# Patient Record
Sex: FEMALE | Race: BLACK OR AFRICAN AMERICAN | NOT HISPANIC OR LATINO | Employment: OTHER | URBAN - METROPOLITAN AREA
[De-identification: names, ages, dates, MRNs, and addresses within clinical notes are randomized per-mention and may not be internally consistent; named-entity substitution may affect disease eponyms.]

---

## 2017-01-23 ENCOUNTER — ALLSCRIPTS OFFICE VISIT (OUTPATIENT)
Dept: OTHER | Facility: OTHER | Age: 72
End: 2017-01-23

## 2017-01-23 DIAGNOSIS — Z13.820 ENCOUNTER FOR SCREENING FOR OSTEOPOROSIS: ICD-10-CM

## 2017-01-23 DIAGNOSIS — E11.9 TYPE 2 DIABETES MELLITUS WITHOUT COMPLICATIONS (HCC): ICD-10-CM

## 2017-03-29 ENCOUNTER — APPOINTMENT (OUTPATIENT)
Dept: LAB | Facility: CLINIC | Age: 72
End: 2017-03-29
Payer: MEDICARE

## 2017-03-29 ENCOUNTER — TRANSCRIBE ORDERS (OUTPATIENT)
Dept: MRI IMAGING | Facility: CLINIC | Age: 72
End: 2017-03-29

## 2017-03-29 DIAGNOSIS — E11.9 TYPE 2 DIABETES MELLITUS WITHOUT COMPLICATIONS (HCC): ICD-10-CM

## 2017-03-29 LAB
ALBUMIN SERPL BCP-MCNC: 3.2 G/DL (ref 3.5–5)
ALP SERPL-CCNC: 102 U/L (ref 46–116)
ALT SERPL W P-5'-P-CCNC: 21 U/L (ref 12–78)
ANION GAP SERPL CALCULATED.3IONS-SCNC: 9 MMOL/L (ref 4–13)
AST SERPL W P-5'-P-CCNC: 14 U/L (ref 5–45)
BASOPHILS # BLD AUTO: 0.02 THOUSANDS/ΜL (ref 0–0.1)
BASOPHILS NFR BLD AUTO: 1 % (ref 0–1)
BILIRUB SERPL-MCNC: 0.54 MG/DL (ref 0.2–1)
BUN SERPL-MCNC: 24 MG/DL (ref 5–25)
CALCIUM SERPL-MCNC: 8.9 MG/DL (ref 8.3–10.1)
CHLORIDE SERPL-SCNC: 104 MMOL/L (ref 100–108)
CHOLEST SERPL-MCNC: 226 MG/DL (ref 50–200)
CO2 SERPL-SCNC: 28 MMOL/L (ref 21–32)
CREAT SERPL-MCNC: 1.19 MG/DL (ref 0.6–1.3)
CREAT UR-MCNC: 134 MG/DL
EOSINOPHIL # BLD AUTO: 0.1 THOUSAND/ΜL (ref 0–0.61)
EOSINOPHIL NFR BLD AUTO: 3 % (ref 0–6)
ERYTHROCYTE [DISTWIDTH] IN BLOOD BY AUTOMATED COUNT: 13.8 % (ref 11.6–15.1)
EST. AVERAGE GLUCOSE BLD GHB EST-MCNC: 143 MG/DL
GFR SERPL CREATININE-BSD FRML MDRD: 54.2 ML/MIN/1.73SQ M
GLUCOSE P FAST SERPL-MCNC: 103 MG/DL (ref 65–99)
HBA1C MFR BLD: 6.6 % (ref 4.2–6.3)
HCT VFR BLD AUTO: 38.8 % (ref 34.8–46.1)
HDLC SERPL-MCNC: 58 MG/DL (ref 40–60)
HGB BLD-MCNC: 12.7 G/DL (ref 11.5–15.4)
LDLC SERPL CALC-MCNC: 150 MG/DL (ref 0–100)
LYMPHOCYTES # BLD AUTO: 1.61 THOUSANDS/ΜL (ref 0.6–4.47)
LYMPHOCYTES NFR BLD AUTO: 40 % (ref 14–44)
MCH RBC QN AUTO: 28.5 PG (ref 26.8–34.3)
MCHC RBC AUTO-ENTMCNC: 32.7 G/DL (ref 31.4–37.4)
MCV RBC AUTO: 87 FL (ref 82–98)
MICROALBUMIN UR-MCNC: 9.8 MG/L (ref 0–20)
MICROALBUMIN/CREAT 24H UR: 7 MG/G CREATININE (ref 0–30)
MONOCYTES # BLD AUTO: 0.41 THOUSAND/ΜL (ref 0.17–1.22)
MONOCYTES NFR BLD AUTO: 10 % (ref 4–12)
NEUTROPHILS # BLD AUTO: 1.91 THOUSANDS/ΜL (ref 1.85–7.62)
NEUTS SEG NFR BLD AUTO: 46 % (ref 43–75)
NRBC BLD AUTO-RTO: 0 /100 WBCS
PLATELET # BLD AUTO: 264 THOUSANDS/UL (ref 149–390)
PMV BLD AUTO: 10.6 FL (ref 8.9–12.7)
POTASSIUM SERPL-SCNC: 3.7 MMOL/L (ref 3.5–5.3)
PROT SERPL-MCNC: 7.7 G/DL (ref 6.4–8.2)
RBC # BLD AUTO: 4.45 MILLION/UL (ref 3.81–5.12)
SODIUM SERPL-SCNC: 141 MMOL/L (ref 136–145)
TRIGL SERPL-MCNC: 90 MG/DL
WBC # BLD AUTO: 4.06 THOUSAND/UL (ref 4.31–10.16)

## 2017-03-29 PROCEDURE — 36415 COLL VENOUS BLD VENIPUNCTURE: CPT

## 2017-03-29 PROCEDURE — 80053 COMPREHEN METABOLIC PANEL: CPT

## 2017-03-29 PROCEDURE — 85025 COMPLETE CBC W/AUTO DIFF WBC: CPT

## 2017-03-29 PROCEDURE — 82570 ASSAY OF URINE CREATININE: CPT

## 2017-03-29 PROCEDURE — 83036 HEMOGLOBIN GLYCOSYLATED A1C: CPT

## 2017-03-29 PROCEDURE — 82043 UR ALBUMIN QUANTITATIVE: CPT

## 2017-03-29 PROCEDURE — 80061 LIPID PANEL: CPT

## 2017-03-30 ENCOUNTER — GENERIC CONVERSION - ENCOUNTER (OUTPATIENT)
Dept: OTHER | Facility: OTHER | Age: 72
End: 2017-03-30

## 2017-04-10 ENCOUNTER — TRANSCRIBE ORDERS (OUTPATIENT)
Dept: LAB | Facility: CLINIC | Age: 72
End: 2017-04-10

## 2017-04-10 ENCOUNTER — APPOINTMENT (OUTPATIENT)
Dept: LAB | Facility: CLINIC | Age: 72
End: 2017-04-10
Payer: MEDICARE

## 2017-04-10 ENCOUNTER — ALLSCRIPTS OFFICE VISIT (OUTPATIENT)
Dept: OTHER | Facility: OTHER | Age: 72
End: 2017-04-10

## 2017-04-10 DIAGNOSIS — E11.9 TYPE 2 DIABETES MELLITUS WITHOUT COMPLICATIONS (HCC): ICD-10-CM

## 2017-04-10 DIAGNOSIS — R39.9 UNSPECIFIED SYMPTOMS AND SIGNS INVOLVING THE GENITOURINARY SYSTEM: ICD-10-CM

## 2017-04-10 LAB
BACTERIA UR QL AUTO: ABNORMAL /HPF
BILIRUB UR QL STRIP: NEGATIVE
CLARITY UR: CLEAR
COLOR UR: YELLOW
GLUCOSE UR STRIP-MCNC: NEGATIVE MG/DL
HGB UR QL STRIP.AUTO: NEGATIVE
KETONES UR STRIP-MCNC: NEGATIVE MG/DL
LEUKOCYTE ESTERASE UR QL STRIP: ABNORMAL
NITRITE UR QL STRIP: NEGATIVE
NON-SQ EPI CELLS URNS QL MICRO: ABNORMAL /HPF
PH UR STRIP.AUTO: 6 [PH] (ref 4.5–8)
PROT UR STRIP-MCNC: NEGATIVE MG/DL
RBC #/AREA URNS AUTO: ABNORMAL /HPF
SP GR UR STRIP.AUTO: 1.02 (ref 1–1.03)
UROBILINOGEN UR QL STRIP.AUTO: 1 E.U./DL
WBC #/AREA URNS AUTO: ABNORMAL /HPF

## 2017-04-10 PROCEDURE — 81001 URINALYSIS AUTO W/SCOPE: CPT

## 2017-04-10 PROCEDURE — 87086 URINE CULTURE/COLONY COUNT: CPT

## 2017-04-11 ENCOUNTER — ALLSCRIPTS OFFICE VISIT (OUTPATIENT)
Dept: OTHER | Facility: OTHER | Age: 72
End: 2017-04-11

## 2017-04-11 ENCOUNTER — GENERIC CONVERSION - ENCOUNTER (OUTPATIENT)
Dept: OTHER | Facility: OTHER | Age: 72
End: 2017-04-11

## 2017-04-11 LAB — BACTERIA UR CULT: NORMAL

## 2017-04-11 RX ORDER — AZELASTINE HYDROCHLORIDE 0.5 MG/ML
1 SOLUTION/ DROPS OPHTHALMIC 2 TIMES DAILY
COMMUNITY
End: 2018-04-09

## 2017-04-11 RX ORDER — DICLOFENAC SODIUM 75 MG/1
75 TABLET, DELAYED RELEASE ORAL 2 TIMES DAILY
COMMUNITY
End: 2018-04-09

## 2017-04-11 RX ORDER — PANTOPRAZOLE SODIUM 40 MG/1
40 TABLET, DELAYED RELEASE ORAL DAILY
COMMUNITY
End: 2019-03-11 | Stop reason: SDDI

## 2017-04-11 RX ORDER — LOVASTATIN 10 MG/1
10 TABLET ORAL
COMMUNITY
End: 2018-01-29 | Stop reason: SDUPTHER

## 2017-04-11 RX ORDER — QUINAPRIL HCL AND HYDROCHLOROTHIAZIDE 10; 12.5 MG/1; MG/1
1 TABLET ORAL DAILY
COMMUNITY
End: 2018-06-07 | Stop reason: SDUPTHER

## 2017-04-11 RX ORDER — LEVOCETIRIZINE DIHYDROCHLORIDE 5 MG/1
5 TABLET, FILM COATED ORAL AS NEEDED
COMMUNITY

## 2017-04-18 ENCOUNTER — ANESTHESIA EVENT (OUTPATIENT)
Dept: PERIOP | Facility: HOSPITAL | Age: 72
End: 2017-04-18
Payer: MEDICARE

## 2017-04-19 ENCOUNTER — ANESTHESIA (OUTPATIENT)
Dept: PERIOP | Facility: HOSPITAL | Age: 72
End: 2017-04-19
Payer: MEDICARE

## 2017-04-19 ENCOUNTER — GENERIC CONVERSION - ENCOUNTER (OUTPATIENT)
Dept: OTHER | Facility: OTHER | Age: 72
End: 2017-04-19

## 2017-04-19 ENCOUNTER — HOSPITAL ENCOUNTER (OUTPATIENT)
Facility: HOSPITAL | Age: 72
Setting detail: OUTPATIENT SURGERY
Discharge: HOME | End: 2017-04-19
Attending: INTERNAL MEDICINE | Admitting: INTERNAL MEDICINE
Payer: MEDICARE

## 2017-04-19 VITALS
RESPIRATION RATE: 17 BRPM | HEIGHT: 66 IN | BODY MASS INDEX: 29.79 KG/M2 | TEMPERATURE: 97.3 F | HEART RATE: 68 BPM | DIASTOLIC BLOOD PRESSURE: 89 MMHG | OXYGEN SATURATION: 100 % | SYSTOLIC BLOOD PRESSURE: 144 MMHG | WEIGHT: 185.38 LBS

## 2017-04-19 DIAGNOSIS — K58.9 IRRITABLE BOWEL SYNDROME WITHOUT DIARRHEA: ICD-10-CM

## 2017-04-19 DIAGNOSIS — Z86.010 HISTORY OF COLONIC POLYPS: ICD-10-CM

## 2017-04-19 DIAGNOSIS — Z12.11 ENCOUNTER FOR SCREENING FOR MALIGNANT NEOPLASM OF COLON: ICD-10-CM

## 2017-04-19 DIAGNOSIS — R10.84 GENERALIZED ABDOMINAL PAIN: ICD-10-CM

## 2017-04-19 LAB — GLUCOSE SERPL-MCNC: 119 MG/DL (ref 65–140)

## 2017-04-19 PROCEDURE — 88341 IMHCHEM/IMCYTCHM EA ADD ANTB: CPT | Performed by: INTERNAL MEDICINE

## 2017-04-19 PROCEDURE — 82948 REAGENT STRIP/BLOOD GLUCOSE: CPT

## 2017-04-19 PROCEDURE — 88305 TISSUE EXAM BY PATHOLOGIST: CPT | Performed by: INTERNAL MEDICINE

## 2017-04-19 PROCEDURE — 88342 IMHCHEM/IMCYTCHM 1ST ANTB: CPT | Performed by: INTERNAL MEDICINE

## 2017-04-19 RX ORDER — PROPOFOL 10 MG/ML
INJECTION, EMULSION INTRAVENOUS AS NEEDED
Status: DISCONTINUED | OUTPATIENT
Start: 2017-04-19 | End: 2017-04-19 | Stop reason: SURG

## 2017-04-19 RX ORDER — SODIUM CHLORIDE, SODIUM LACTATE, POTASSIUM CHLORIDE, CALCIUM CHLORIDE 600; 310; 30; 20 MG/100ML; MG/100ML; MG/100ML; MG/100ML
INJECTION, SOLUTION INTRAVENOUS CONTINUOUS PRN
Status: DISCONTINUED | OUTPATIENT
Start: 2017-04-19 | End: 2017-04-19 | Stop reason: SURG

## 2017-04-19 RX ORDER — SODIUM CHLORIDE, SODIUM LACTATE, POTASSIUM CHLORIDE, CALCIUM CHLORIDE 600; 310; 30; 20 MG/100ML; MG/100ML; MG/100ML; MG/100ML
50 INJECTION, SOLUTION INTRAVENOUS CONTINUOUS
Status: DISCONTINUED | OUTPATIENT
Start: 2017-04-19 | End: 2017-04-19 | Stop reason: HOSPADM

## 2017-04-19 RX ADMIN — PROPOFOL 50 MG: 10 INJECTION, EMULSION INTRAVENOUS at 11:34

## 2017-04-19 RX ADMIN — PROPOFOL 50 MG: 10 INJECTION, EMULSION INTRAVENOUS at 11:28

## 2017-04-19 RX ADMIN — PROPOFOL 50 MG: 10 INJECTION, EMULSION INTRAVENOUS at 11:40

## 2017-04-19 RX ADMIN — SODIUM CHLORIDE, SODIUM LACTATE, POTASSIUM CHLORIDE, AND CALCIUM CHLORIDE: .6; .31; .03; .02 INJECTION, SOLUTION INTRAVENOUS at 11:01

## 2017-04-19 RX ADMIN — PROPOFOL 30 MG: 10 INJECTION, EMULSION INTRAVENOUS at 11:31

## 2017-04-19 RX ADMIN — PROPOFOL 120 MG: 10 INJECTION, EMULSION INTRAVENOUS at 11:26

## 2017-04-19 RX ADMIN — PROPOFOL 50 MG: 10 INJECTION, EMULSION INTRAVENOUS at 11:37

## 2017-04-24 ENCOUNTER — GENERIC CONVERSION - ENCOUNTER (OUTPATIENT)
Dept: OTHER | Facility: OTHER | Age: 72
End: 2017-04-24

## 2017-05-05 ENCOUNTER — HOSPITAL ENCOUNTER (OUTPATIENT)
Dept: MAMMOGRAPHY | Facility: CLINIC | Age: 72
Discharge: HOME | End: 2017-05-05
Payer: MEDICARE

## 2017-05-05 ENCOUNTER — GENERIC CONVERSION - ENCOUNTER (OUTPATIENT)
Dept: OTHER | Facility: OTHER | Age: 72
End: 2017-05-05

## 2017-05-05 DIAGNOSIS — Z13.820 ENCOUNTER FOR SCREENING FOR OSTEOPOROSIS: ICD-10-CM

## 2017-05-05 PROCEDURE — 77080 DXA BONE DENSITY AXIAL: CPT

## 2017-11-01 DIAGNOSIS — E78.5 HYPERLIPIDEMIA: ICD-10-CM

## 2017-11-01 DIAGNOSIS — I10 ESSENTIAL (PRIMARY) HYPERTENSION: ICD-10-CM

## 2017-11-01 DIAGNOSIS — E11.9 TYPE 2 DIABETES MELLITUS WITHOUT COMPLICATIONS (HCC): ICD-10-CM

## 2017-11-22 ENCOUNTER — APPOINTMENT (OUTPATIENT)
Dept: LAB | Facility: CLINIC | Age: 72
End: 2017-11-22
Payer: MEDICARE

## 2017-11-22 ENCOUNTER — TRANSCRIBE ORDERS (OUTPATIENT)
Dept: RADIOLOGY | Facility: CLINIC | Age: 72
End: 2017-11-22

## 2017-11-22 ENCOUNTER — GENERIC CONVERSION - ENCOUNTER (OUTPATIENT)
Dept: OTHER | Facility: OTHER | Age: 72
End: 2017-11-22

## 2017-11-22 DIAGNOSIS — I10 ESSENTIAL (PRIMARY) HYPERTENSION: ICD-10-CM

## 2017-11-22 DIAGNOSIS — E78.5 HYPERLIPIDEMIA: ICD-10-CM

## 2017-11-22 DIAGNOSIS — E11.9 TYPE 2 DIABETES MELLITUS WITHOUT COMPLICATIONS (HCC): ICD-10-CM

## 2017-11-22 LAB
ALBUMIN SERPL BCP-MCNC: 3.1 G/DL (ref 3.5–5)
ALP SERPL-CCNC: 91 U/L (ref 46–116)
ALT SERPL W P-5'-P-CCNC: 17 U/L (ref 12–78)
ANION GAP SERPL CALCULATED.3IONS-SCNC: 5 MMOL/L (ref 4–13)
AST SERPL W P-5'-P-CCNC: 13 U/L (ref 5–45)
BASOPHILS # BLD AUTO: 0.02 THOUSANDS/ΜL (ref 0–0.1)
BASOPHILS NFR BLD AUTO: 1 % (ref 0–1)
BILIRUB SERPL-MCNC: 0.52 MG/DL (ref 0.2–1)
BUN SERPL-MCNC: 16 MG/DL (ref 5–25)
CALCIUM SERPL-MCNC: 9.3 MG/DL (ref 8.3–10.1)
CHLORIDE SERPL-SCNC: 101 MMOL/L (ref 100–108)
CHOLEST SERPL-MCNC: 163 MG/DL (ref 50–200)
CO2 SERPL-SCNC: 30 MMOL/L (ref 21–32)
CREAT SERPL-MCNC: 1.05 MG/DL (ref 0.6–1.3)
EOSINOPHIL # BLD AUTO: 0.07 THOUSAND/ΜL (ref 0–0.61)
EOSINOPHIL NFR BLD AUTO: 2 % (ref 0–6)
ERYTHROCYTE [DISTWIDTH] IN BLOOD BY AUTOMATED COUNT: 13.6 % (ref 11.6–15.1)
EST. AVERAGE GLUCOSE BLD GHB EST-MCNC: 137 MG/DL
GFR SERPL CREATININE-BSD FRML MDRD: 62 ML/MIN/1.73SQ M
GLUCOSE P FAST SERPL-MCNC: 103 MG/DL (ref 65–99)
HBA1C MFR BLD: 6.4 % (ref 4.2–6.3)
HCT VFR BLD AUTO: 38 % (ref 34.8–46.1)
HDLC SERPL-MCNC: 54 MG/DL (ref 40–60)
HGB BLD-MCNC: 12.5 G/DL (ref 11.5–15.4)
LDLC SERPL CALC-MCNC: 90 MG/DL (ref 0–100)
LYMPHOCYTES # BLD AUTO: 1.73 THOUSANDS/ΜL (ref 0.6–4.47)
LYMPHOCYTES NFR BLD AUTO: 40 % (ref 14–44)
MCH RBC QN AUTO: 28.8 PG (ref 26.8–34.3)
MCHC RBC AUTO-ENTMCNC: 32.9 G/DL (ref 31.4–37.4)
MCV RBC AUTO: 88 FL (ref 82–98)
MONOCYTES # BLD AUTO: 0.46 THOUSAND/ΜL (ref 0.17–1.22)
MONOCYTES NFR BLD AUTO: 11 % (ref 4–12)
NEUTROPHILS # BLD AUTO: 2.06 THOUSANDS/ΜL (ref 1.85–7.62)
NEUTS SEG NFR BLD AUTO: 46 % (ref 43–75)
NRBC BLD AUTO-RTO: 0 /100 WBCS
PLATELET # BLD AUTO: 246 THOUSANDS/UL (ref 149–390)
PMV BLD AUTO: 9.8 FL (ref 8.9–12.7)
POTASSIUM SERPL-SCNC: 4.3 MMOL/L (ref 3.5–5.3)
PROT SERPL-MCNC: 7.5 G/DL (ref 6.4–8.2)
RBC # BLD AUTO: 4.34 MILLION/UL (ref 3.81–5.12)
SODIUM SERPL-SCNC: 136 MMOL/L (ref 136–145)
TRIGL SERPL-MCNC: 96 MG/DL
WBC # BLD AUTO: 4.36 THOUSAND/UL (ref 4.31–10.16)

## 2017-11-22 PROCEDURE — 80061 LIPID PANEL: CPT

## 2017-11-22 PROCEDURE — 83036 HEMOGLOBIN GLYCOSYLATED A1C: CPT

## 2017-11-22 PROCEDURE — 80053 COMPREHEN METABOLIC PANEL: CPT

## 2017-11-22 PROCEDURE — 85025 COMPLETE CBC W/AUTO DIFF WBC: CPT

## 2017-11-22 PROCEDURE — 36415 COLL VENOUS BLD VENIPUNCTURE: CPT

## 2017-12-05 ENCOUNTER — ALLSCRIPTS OFFICE VISIT (OUTPATIENT)
Dept: OTHER | Facility: OTHER | Age: 72
End: 2017-12-05

## 2017-12-05 DIAGNOSIS — E11.9 TYPE 2 DIABETES MELLITUS WITHOUT COMPLICATIONS (HCC): ICD-10-CM

## 2017-12-05 DIAGNOSIS — M25.532 PAIN IN LEFT WRIST: ICD-10-CM

## 2017-12-06 NOTE — PROGRESS NOTES
Assessment    1  Type 2 diabetes mellitus (250 00) (E11 9)   2  Hypertension, benign (401 1) (I10)   3  Hyperlipidemia (272 4) (E78 5)   4  Left wrist pain (719 43) (M25 532)    Plan  Left wrist pain    · * XR WRIST 3+ VIEW LEFT; Status:Active; Requested for:56Bho4820;   Need for prophylactic vaccination against Streptococcus pneumoniae (pneumococcus)    · Prevnar 13 Intramuscular Suspension  Need for prophylactic vaccination and inoculation against influenza    · Fluzone High-Dose 0 5 ML Intramuscular Suspension Prefilled Syringe  Screening for genitourinary condition    · *VB - Urinary Incontinence Screen (Dx Z13 89 Screen for UI); Status:Complete - Retrospective ByProtocol Authorization;   Done: 42KNX5825 01:18PM  Type 2 diabetes mellitus    · (1) CBC/PLT/DIFF; Status:Active; Requested for:87Vqf6996;    · (1) COMPREHENSIVE METABOLIC PANEL; Status:Active; Requested for:25Nfo8654;    · (1) HEMOGLOBIN A1C; Status:Active; Requested for:32Jen4532;    · (1) LIPID PANEL, FASTING; Status:Active; Requested for:03Por4823;    · Follow-up visit in 4 Months Evaluation and Treatment  Follow-up  Status: Hold For - Scheduling Requested for: 24WGF8405    Discussion/Summary  Discussion Summary:   Chronic problems are stable  No changes in meds  Continue diet and exercise  her wrist will be arthritis  Counseling Documentation With Imm: The patient was counseled regarding instructions for management,-- impressions  Medication SE Review and Pt Understands Tx: Possible side effects of new medications were reviewed with the patient/guardian today  The treatment plan was reviewed with the patient/guardian  The patient/guardian understands and agrees with the treatment plan   Self Referrals:   Self Referrals: No      Chief Complaint  Chief Complaint Free Text Note Form: Patient is here today for a routine follow up and lab review  History of Present Illness  HPI: Here for followup with her    Her and her  did get the pill boxes as we discussed and they have been much more compliant with their meds  Her labs are all improved  Pressure is controlled  Sugar is down further  Cholesterol is controlled  Today she reports more problems with her left wrist that has been bothering her for awhile  She may have fallen but the two of them could not agree on this  Review of Systems  Complete-Female:  Cardiovascular: no chest pain  Respiratory: no shortness of breath  Gastrointestinal: no abdominal pain  Active Problems  1  Advance directive discussed with patient (V65 49) (Z71 89)   2  Allergic rhinitis (477 9) (J30 9)   3  Chronic bilateral low back pain with right-sided sciatica (724 2,724 3,338 29) (M54 41,G89 29)   4  Colon cancer screening (V76 51) (Z12 11)   5  Generalized abdominal pain (789 07) (R10 84)   6  History of colon polyps (V12 72) (Z86 010)   7  Hyperlipidemia (272 4) (E78 5)   8  Hypertension, benign (401 1) (I10)   9  Irritable bowel syndrome without diarrhea (564 1) (K58 9)   10  Multiple nevi (216 9) (D22 9)   11  Screening for osteoporosis (V82 81) (Z13 820)   12  Type 2 diabetes mellitus (250 00) (E11 9)   13  UTI symptoms (788 99) (R39 9)    Past Medical History  1  History of HNP (herniated nucleus pulposus), lumbar (722 10) (M51 26)   2  History of Osteoarthritis of lumbar spine (721 3) (M47 816)  Active Problems And Past Medical History Reviewed: The active problems and past medical history were reviewed and updated today  Surgical History  1  History of Parathyroid Surgery   2  History of Umbilical Hernia Repair    Family History  Mother    1  Family history of Breast cancer, female  Brother    2  Family history of Living and Healthy  Family History    3  Family history of hypertension (V17 49) (Z82 49)    Social History     ·    · Never smoked cigarettes (V49 89) (Z78 9)   · No alcohol use   · Retired   · Two children    Current Meds   1   Azelastine HCl - 0 05 % Ophthalmic Solution; INSTILL 1 DROP IN BOTH EYES EVERY 12 HOURS DAILY; Therapy: 27Apr2015 to (Evaluate:24Zyf0160)  Requested for: ; Last Rx:27Apr2015 Ordered   2  Diclofenac Sodium 75 MG Oral Tablet Delayed Release; Take 1 tablet twice daily; Therapy: 24TPI7886 to (Evaluate:23Apr2017)  Requested for: 93RXB4123; Last Rx:23Jan2017 Ordered   3  FreeStyle Lite Test In [employer]; test twice daily; Therapy: 63KHQ4091 to (Evaluate:35Omt0111)  Requested for: 67MZQ3816; Last Rx:03Nov2016 Ordered   4  Levocetirizine Dihydrochloride 5 MG Oral Tablet; TAKE 1 TABLET DAILY IN THE EVENING; Therapy: 27Apr2015 to 9615 9032)  Requested for: ; Last Rx:27Apr2015 Ordered   5  Levsin 0 125 MG Oral Tablet; TAKE 1 TABLET 3-4 TIMES DAILY AS NEEDED; Therapy: 19Qts7084 to (Evaluate:10Jun2017)  Requested for: 54Yat9341; Last Rx:51Fsu5924 Ordered   6  Lovastatin 10 MG Oral Tablet; TAKE 1 TABLET AT BEDTIME; Therapy: 38DNX0652 to (Evaluate:30Jan2018)  Requested for: 87ACC5017; Last Rx:01Nov2017 Ordered   7  MetFORMIN HCl - 1000 MG Oral Tablet; take one tablet by mouth twice daily; Therapy: 55FHM8701 to [phone])  Requested for: 63JRM3688; Last Rx:25Jan2017 Ordered   8  Pantoprazole Sodium 40 MG Oral Tablet Delayed Release; Take 1 tablet daily; Therapy: 54YZF7352 to ([phone])  Requested for: 31AYR5325; Last Rx:23Jan2017 Ordered   9  Quinapril-Hydrochlorothiazide 10-12 5 MG Oral Tablet; Take 1 tablet daily; Therapy: 11ONU8417 to (Evaluate:08Dec2017)  Requested for: 94KXJ2858; Last Rx:08Nov2017 Ordered  Medication List Reviewed: The medication list was reviewed and updated today  Allergies  1  Codeine Derivatives  2  FRUIT   3  Almonds   4   Other    Vitals  Vital Signs    Recorded: 35QTV9792 12:55PM   Temperature 97 5 F   Heart Rate 84   Systolic 368   Diastolic 66   Height 5 ft 6 in   Weight 188 lb    BMI Calculated 30 34   BSA Calculated 1 95   O2 Saturation 95       Physical Exam   Constitutional  General appearance: No acute distress, well appearing and well nourished  Pulmonary  Respiratory effort: No increased work of breathing or signs of respiratory distress  Auscultation of lungs: Clear to auscultation  Cardiovascular  Auscultation of heart: Normal rate and rhythm, normal S1 and S2, without murmurs  Examination of extremities for edema and/or varicosities: Normal    Psychiatric  Orientation to person, place, and time: Normal    Mood and affect: Normal          Results/Data  PHQ-9 Adult Depression Screening 72ENP7690 01:36PM User, [employer]     Test Name Result Flag Reference   PHQ-9 Adult Depression Score 1       Over the last two weeks, how often have you been bothered by any of the following problems? Little interest or pleasure in doing things: Not at all - 0 Feeling down, depressed, or hopeless: Not at all - 0 Trouble falling or staying asleep, or sleeping too much: Not at all - 0 Feeling tired or having little energy: Several days - 1 Poor appetite or over eating: Not at all - 0 Feeling bad about yourself - or that you are a failure or have let yourself or your family down: Not at all - 0 Trouble concentrating on things, such as reading the newspaper or watching television: Not at all - 0 Moving or speaking so slowly that other people could have noticed   Or the opposite -  being so fidgety or restless that you have been moving around a lot more than usual: Not at all - 0 Thoughts that you would be better off dead, or of hurting yourself in some way: Not at all - 0   PHQ-9 Adult Depression Screening Negative     PHQ-9 Difficulty Level Not difficult at all     PHQ-9 Severity Minimal Depression       *VB - Urinary Incontinence Screen (Dx Z13 89 Screen for UI) 34HXL2773 01:18PM Jose Gatica     Test Name Result Flag Reference   Urinary Incontinence Assessment 88ATO7492       Falls Risk Assessment (Dx Z13 89 Screen for Neurologic Disorder) 33WLG3717 01:16PM User, [employer]     Test Name Result Flag Reference Falls Risk      No falls in the past year     (1) CBC/PLT/DIFF 22Nov2017 08:34AM Jonnie Paulino Order Number: [id number]     Test Name Result Flag Reference   WBC COUNT 4 36 Thousand/uL  4 31-10 16   RBC COUNT 4 34 Million/uL  3 81-5 12   HEMOGLOBIN 12 5 g/dL  11 5-15 4   HEMATOCRIT 38 0 %  34 8-46  1   MCV 88 fL  82-98   MCH 28 8 pg  26 8-34 3   MCHC 32 9 g/dL  31 4-37 4   RDW 13 6 %  11 6-15 1   MPV 9 8 fL  8 9-12 7   PLATELET COUNT 424 Thousands/uL  149-390   nRBC AUTOMATED 0 /100 WBCs     NEUTROPHILS RELATIVE PERCENT 46 %  43-75   LYMPHOCYTES RELATIVE PERCENT 40 %  14-44   MONOCYTES RELATIVE PERCENT 11 %  4-12   EOSINOPHILS RELATIVE PERCENT 2 %  0-6   BASOPHILS RELATIVE PERCENT 1 %  0-1   NEUTROPHILS ABSOLUTE COUNT 2 06 Thousands/? ??L  1 85-7 62   LYMPHOCYTES ABSOLUTE COUNT 1 73 Thousands/? ??L  0 60-4 47   MONOCYTES ABSOLUTE COUNT 0 46 Thousand/? ??L  0 17-1 22   EOSINOPHILS ABSOLUTE COUNT 0 07 Thousand/? ??L  0 00-0 61   BASOPHILS ABSOLUTE COUNT 0 02 Thousands/? ??L  0 00-0 10     (1) COMPREHENSIVE METABOLIC PANEL 91UET5668 77:01FJ Jonnie Paulino Order Number: [id number]     Test Name Result Flag Reference   SODIUM 136 mmol/L  136-145   POTASSIUM 4 3 mmol/L  3 5-5 3   CHLORIDE 101 mmol/L  100-108   CARBON DIOXIDE 30 mmol/L  21-32   ANION GAP (CALC) 5 mmol/L  4-13   BLOOD UREA NITROGEN 16 mg/dL  5-25   CREATININE 1 05 mg/dL  0 60-1 30   Standardized to IDMS reference method   CALCIUM 9 3 mg/dL  8 3-10 1   BILI, TOTAL 0 52 mg/dL  0 20-1 00   ALK PHOSPHATAS 91 U/L     ALT (SGPT) 17 U/L  12-78   Specimen collection should occur prior to Sulfasalazine and/or Sulfapyridine administration due to the potential for falsely depressed results  AST(SGOT) 13 U/L  5-45   Specimen collection should occur prior to Sulfasalazine administration due to the potential for falsely depressed results     ALBUMIN 3 1 g/dL L 3 5-5 0   TOTAL PROTEIN 7 5 g/dL  6 4-8 2   eGFR 62 ml/min/1 73sq m       [facility] [employer] Kidney Disease Education Program recommendations are as follows: GFR calculation is accurate only with a steady state creatinine Chronic Kidney disease less than 60 ml/min/1 73 sq  meters Kidney failure less than 15 ml/min/1 73 sq  meters  GLUCOSE FASTING 103 mg/dL H 65-99   Specimen collection should occur prior to Sulfasalazine administration due to the potential for falsely depressed results  Specimen collection should occur prior to Sulfapyridine administration due to the potential for falsely elevated results  (1) HEMOGLOBIN A1C 22Nov2017 08:34AM Rancho Cox Order Number: [id number]     Test Name Result Flag Reference   HEMOGLOBIN A1C 6 4 % H 4 2-6 3   EST  AVG  GLUCOSE 137 mg/dl       (1) LIPID PANEL, FASTING 22Nov2017 08:34AM Rancho Cox Order Number: [id number]     Test Name Result Flag Reference   CHOLESTEROL 163 mg/dL     HDL,DIRECT 54 mg/dL  40-60   Specimen collection should occur prior to Metamizole administration due to the potential for falsley depressed results  LDL CHOLESTEROL CALCULATED 90 mg/dL  0-100     Triglyceride:       Normal <150 mg/dl  Borderline High 150-199 mg/dl  High 200-499 mg/dl  Very High >499 mg/dl   Cholesterol:      Desirable <200 mg/dl   Borderline High 200-239 mg/dl   High >239 mg/dl   HDL Cholesterol:      High>59 mg/dL   Low <41 mg/dL   This screening LDL is a calculated result  It does not have the accuracy of the Direct Measured LDL in the monitoring of patients with hyperlipidemia and/or statin therapy  Direct Measure LDL (BEF216) must be ordered separately in these patients  TRIGLYCERIDES 96 mg/dL  <=150   Specimen collection should occur prior to N-Acetylcysteine or Metamizole administration due to the potential for falsely depressed results  Health Management  Colon cancer screening   COLONOSCOPY; every 5 years; Last 84YKJ4349; Next Due: 11Lni8867;  Active  Type 2 diabetes mellitus   *VB - Eye Exam; every 2 years; Last 29Jul2016; Next Due: 48GSG0716; Active  Visit for screening mammogram   Digital Bilateral Screening Mammogram With CAD; every 2 years; Last 23Oct2015; Next MQS:96YEN4857;  Overdue    Signatures   Electronically signed by : Alvin Hinkle MD; Dec  5 2017  1:53PM EST                       (Author)

## 2017-12-15 ENCOUNTER — APPOINTMENT (OUTPATIENT)
Dept: RADIOLOGY | Facility: CLINIC | Age: 72
End: 2017-12-15
Payer: MEDICARE

## 2017-12-15 DIAGNOSIS — M25.532 PAIN IN LEFT WRIST: ICD-10-CM

## 2017-12-15 PROCEDURE — 73110 X-RAY EXAM OF WRIST: CPT

## 2017-12-21 ENCOUNTER — GENERIC CONVERSION - ENCOUNTER (OUTPATIENT)
Dept: OTHER | Facility: OTHER | Age: 72
End: 2017-12-21

## 2018-01-11 NOTE — RESULT NOTES
Verified Results  (1) LIPID PANEL, FASTING 22Nov2017 08:34AM Katherine Kaur Order Number: [id number]     Test Name Result Flag Reference   CHOLESTEROL 163 mg/dL     HDL,DIRECT 54 mg/dL  40-60   Specimen collection should occur prior to Metamizole administration due to the potential for falsley depressed results  LDL CHOLESTEROL CALCULATED 90 mg/dL  0-100   Triglyceride:        Normal <150 mg/dl   Borderline High 150-199 mg/dl   High 200-499 mg/dl   Very High >499 mg/dl      Cholesterol:       Desirable <200 mg/dl    Borderline High 200-239 mg/dl    High >239 mg/dl      HDL Cholesterol:       High>59 mg/dL    Low <41 mg/dL      This screening LDL is a calculated result  It does not have the accuracy of the Direct Measured LDL in the monitoring of patients with hyperlipidemia and/or statin therapy  Direct Measure LDL (KGR249) must be ordered separately in these patients  TRIGLYCERIDES 96 mg/dL  <=150   Specimen collection should occur prior to N-Acetylcysteine or Metamizole administration due to the potential for falsely depressed results  (1) CBC/PLT/DIFF 72CVV7867 08:34A Katherine Kaur Order Number: [id number]     Test Name Result Flag Reference   WBC COUNT 4 36 Thousand/uL  4 31-10 16   RBC COUNT 4 34 Million/uL  3 81-5 12   HEMOGLOBIN 12 5 g/dL  11 5-15 4   HEMATOCRIT 38 0 %  34 8-46  1   MCV 88 fL  82-98   MCH 28 8 pg  26 8-34 3   MCHC 32 9 g/dL  31 4-37 4   RDW 13 6 %  11 6-15 1   MPV 9 8 fL  8 9-12 7   PLATELET COUNT 801 Thousands/uL  149-390   nRBC AUTOMATED 0 /100 WBCs     NEUTROPHILS RELATIVE PERCENT 46 %  43-75   LYMPHOCYTES RELATIVE PERCENT 40 %  14-44   MONOCYTES RELATIVE PERCENT 11 %  4-12   EOSINOPHILS RELATIVE PERCENT 2 %  0-6   BASOPHILS RELATIVE PERCENT 1 %  0-1   NEUTROPHILS ABSOLUTE COUNT 2 06 Thousands/? ??L  1 85-7 62   LYMPHOCYTES ABSOLUTE COUNT 1 73 Thousands/? ??L  0 60-4 47   MONOCYTES ABSOLUTE COUNT 0 46 Thousand/? ??L  0 17-1 22   EOSINOPHILS ABSOLUTE COUNT 0 07 Thousand/? ??L  0 00-0 61   BASOPHILS ABSOLUTE COUNT 0 02 Thousands/? ??L  0 00-0 10     (1) COMPREHENSIVE METABOLIC PANEL 48KVQ4196 54:66UM Katey Smith Order Number: [id number]     Test Name Result Flag Reference   SODIUM 136 mmol/L  136-145   POTASSIUM 4 3 mmol/L  3 5-5 3   CHLORIDE 101 mmol/L  100-108   CARBON DIOXIDE 30 mmol/L  21-32   ANION GAP (CALC) 5 mmol/L  4-13   BLOOD UREA NITROGEN 16 mg/dL  5-25   CREATININE 1 05 mg/dL  0 60-1 30   Standardized to IDMS reference method   CALCIUM 9 3 mg/dL  8 3-10 1   BILI, TOTAL 0 52 mg/dL  0 20-1 00   ALK PHOSPHATAS 91 U/L     ALT (SGPT) 17 U/L  12-78   Specimen collection should occur prior to Sulfasalazine and/or Sulfapyridine administration due to the potential for falsely depressed results  AST(SGOT) 13 U/L  5-45   Specimen collection should occur prior to Sulfasalazine administration due to the potential for falsely depressed results  ALBUMIN 3 1 g/dL L 3 5-5 0   TOTAL PROTEIN 7 5 g/dL  6 4-8 2   eGFR 62 ml/min/1 73sq m     National Kidney Disease Education Program recommendations are as follows:  GFR calculation is accurate only with a steady state creatinine  Chronic Kidney disease less than 60 ml/min/1 73 sq  meters  Kidney failure less than 15 ml/min/1 73 sq  meters  GLUCOSE FASTING 103 mg/dL H 65-99   Specimen collection should occur prior to Sulfasalazine administration due to the potential for falsely depressed results  Specimen collection should occur prior to Sulfapyridine administration due to the potential for falsely elevated results  (1) HEMOGLOBIN A1C 22Nov2017 08:34AM Katey Smith Order Number: [id number]     Test Name Result Flag Reference   HEMOGLOBIN A1C 6 4 % H 4 2-6 3   EST  AVG   GLUCOSE 137 mg/dl

## 2018-01-12 NOTE — RESULT NOTES
Verified Results  * DXA BONE DENSITY SPINE HIP AND PELVIS 05BOQ6072 10:18AM Jojo Solders Order Number: [id number]    - Patient Instructions: To schedule this appointment, please contact Central Scheduling at [phone]  Test Name Result Flag Reference   DXA BONE DENSITY SPINE HIP AND PELVIS (Report)     CENTRAL DXA SCAN     CLINICAL HISTORY:  70year old postmenopausal -American female with no significant past medical history  Osteoporosis screening  TECHNIQUE: Bone densitometry was performed using a [employer] Horizon C bone densitometer  Regions of interest appear properly placed  There are no obvious fractures or other confounding variables which could limit the study  Degenerative changes of the    lumbar spine and hip  This will falsely elevate the bone mineral densities in these regions  COMPARISON: None  RESULTS:    LUMBAR SPINE: L1-L4:   BMD 1 0 90 gm/cm2   T-score 0 4   Z-score 1 9     LUMBAR SPINE L1-L3 (average) : BMD 1 0 13 gm/sq-cm        T-score is 0 0         Z-score is 1 4          LEFT TOTAL HIP:   BMD 0 805 gm/cm2   T-score -1 1   Z-score -0 3     LEFT FEMORAL NECK:   BMD 0 650 gm/cm2   T-score -1 8   Z-score -0 7     LEFT FOREARM :   BMD 0 706 gm/sq-cm,   T-score is 0 4   Z-score is 2 6          IMPRESSION:   1  Based on the [facility] [place] [place] classification, the T-score of -1 8 in the left hip is consistent with low bone mineral density  2  The 10 year risk of hip fracture is 1 % with the 10 year risk of major osteoporotic fracture being 5 % as calculated by the WHO fracture risk assessment tool (FRAX)  The current NOF guidelines recommend treating patients with FRAX 10 year risk    score of >3% for hip fracture and >20% for major osteoporotic fracture  3  Any secondary causes of low bone mineral density should be excluded prior to treatment, if clinically indicated     4  A daily intake of at least 1200 mg calcium and 800 - 1000 IU of Vitamin D, as well as weight bearing and muscle strengthening exercise, fall prevention and avoidance of tobacco and excessive alcohol intake as basic preventive measures are suggested                 WHO CLASSIFICATION:   Normal (a T-score of -1 0 or higher)   Low bone mineral density (a T-score of less than -1 0 but higher than -2 5)   Osteoporosis (a T-score of -2 5 or less)   Severe osteoporosis (a T-score of -2 5 or less with a fragility fracture)             Workstation performed: [id number]     Signed by:   Aarti Clark DO   5/5/17

## 2018-01-12 NOTE — RESULT NOTES
Message   Labs OK     Verified Results  (1) CBC/PLT/DIFF 86OSM0861 11:33AM Kelsi Tyrese     Test Name Result Flag Reference   WBC COUNT 4 09 Thousand/uL L 4 31-10 16   RBC COUNT 4 30 Million/uL  3 81-5 12   HEMOGLOBIN 12 6 g/dL  11 5-15 4   HEMATOCRIT 37 8 %  34 8-46  1   MCV 88 fL  82-98   MCH 29 3 pg  26 8-34 3   MCHC 33 3 g/dL  31 4-37 4   RDW 14 0 %  11 6-15 1   MPV 9 9 fL  8 9-12 7   PLATELET COUNT 615 Thousands/uL  149-390   nRBC AUTOMATED 0 /100 WBCs     NEUTROPHILS RELATIVE PERCENT 46 %  43-75   LYMPHOCYTES RELATIVE PERCENT 41 %  14-44   MONOCYTES RELATIVE PERCENT 10 %  4-12   EOSINOPHILS RELATIVE PERCENT 2 %  0-6   BASOPHILS RELATIVE PERCENT 1 %  0-1   NEUTROPHILS ABSOLUTE COUNT 1 91 Thousands/µL  1 85-7 62   LYMPHOCYTES ABSOLUTE COUNT 1 66 Thousands/µL  0 60-4 47   MONOCYTES ABSOLUTE COUNT 0 39 Thousand/µL  0 17-1 22   EOSINOPHILS ABSOLUTE COUNT 0 10 Thousand/µL  0 00-0 61   BASOPHILS ABSOLUTE COUNT 0 02 Thousands/µL  0 00-0 10     (1) LIPASE 54SVI6503 11:33AM Kelsi Tyrese     Test Name Result Flag Reference   LIPASE 178 u/L       (1) AMYLASE 31ZEI0932 11:32AM [employer]     Test Name Result Flag Reference   AMYLASE 114 IU/L

## 2018-01-12 NOTE — RESULT NOTES
Message   Labs look okay except cholesterol went up  Last set of cholesterol numbers were normal  Watch diet  Repeat labs before next visit  Verified Results  (1) CBC/PLT/DIFF 27Oct2016 10:44AM [place] [facility] Order Number: [id number]     Test Name Result Flag Reference   WBC COUNT 3 68 Thousand/uL L 4 31-10 16   RBC COUNT 4 33 Million/uL  3 81-5 12   HEMOGLOBIN 12 5 g/dL  11 5-15 4   HEMATOCRIT 37 9 %  34 8-46  1   MCV 88 fL  82-98   MCH 28 9 pg  26 8-34 3   MCHC 33 0 g/dL  31 4-37 4   RDW 14 0 %  11 6-15 1   MPV 10 1 fL  8 9-12 7   PLATELET COUNT 292 Thousands/uL  149-390   nRBC AUTOMATED 0 /100 WBCs     NEUTROPHILS RELATIVE PERCENT 52 %  43-75   LYMPHOCYTES RELATIVE PERCENT 35 %  14-44   MONOCYTES RELATIVE PERCENT 10 %  4-12   EOSINOPHILS RELATIVE PERCENT 3 %  0-6   BASOPHILS RELATIVE PERCENT 0 %  0-1   NEUTROPHILS ABSOLUTE COUNT 1 91 Thousands/?L  1 85-7 62   LYMPHOCYTES ABSOLUTE COUNT 1 27 Thousands/?L  0 60-4 47   MONOCYTES ABSOLUTE COUNT 0 37 Thousand/?L  0 17-1 22   EOSINOPHILS ABSOLUTE COUNT 0 11 Thousand/?L  0 00-0 61   BASOPHILS ABSOLUTE COUNT 0 01 Thousands/?L  0 00-0 10   - Patient Instructions: This bloodwork is non-fasting  Please drink two glasses of water morning of bloodwork  - Patient Instructions: This bloodwork is non-fasting  Please drink two glasses of water morning of bloodwork  (1) COMPREHENSIVE METABOLIC PANEL [id number] 97:70MF [place] [facility] Order Number: [id number]     Test Name Result Flag Reference   GLUCOSE,RANDM 111 mg/dL     If the patient is fasting, the ADA then defines impaired fasting glucose as > 100 mg/dL and diabetes as > or equal to 123 mg/dL     SODIUM 138 mmol/L  136-145   POTASSIUM 3 8 mmol/L  3 5-5 3   CHLORIDE 104 mmol/L  100-108   CARBON DIOXIDE 29 mmol/L  21-32   ANION GAP (CALC) 5 mmol/L  4-13   BLOOD UREA NITROGEN 13 mg/dL  5-25   CREATININE 1 13 mg/dL  0 60-1 30   Standardized to IDMS reference method   CALCIUM 8 4 mg/dL 8  3-10 1   BILI, TOTAL 0 37 mg/dL  0 20-1 00   ALK PHOSPHATAS 106 U/L     ALT (SGPT) 17 U/L  12-78   AST(SGOT) 12 U/L  5-45   ALBUMIN 3 2 g/dL L 3 5-5 0   TOTAL PROTEIN 7 4 g/dL  6 4-8 2   eGFR Non-African American 57 7 ml/min/1 73sq m     - Patient Instructions: This is a fasting blood test  Water,black tea or black  coffee only after 9:00pm the night before test Drink 2 glasses of water the morning of test   National Kidney Disease Education Program recommendations are as follows:  GFR calculation is accurate only with a steady state creatinine  Chronic Kidney disease less than 60 ml/min/1 73 sq  meters  Kidney failure less than 15 ml/min/1 73 sq  meters  (1) LIPID PANEL, FASTING 27Oct2016 10:44AM [facility] Order Number: [id number]     Test Name Result Flag Reference   CHOLESTEROL 232 mg/dL H    HDL,DIRECT 62 mg/dL H 40-60   Specimen collection should occur prior to Metamizole administration due to the potential for falsely depressed results  LDL CHOLESTEROL CALCULATED 146 mg/dL H 0-100   - Patient Instructions: This is a fasting blood test  Water,black tea or black  coffee only after 9:00pm the night before test   Drink 2 glasses of water the morning of test     - Patient Instructions: This is a fasting blood test  Water,black tea or black  coffee only after 9:00pm the night before test Drink 2 glasses of water the morning of test   Triglyceride:         Normal              <150 mg/dl       Borderline High    150-199 mg/dl       High               200-499 mg/dl       Very High          >499 mg/dl  Cholesterol:         Desirable        <200 mg/dl      Borderline High  200-239 mg/dl      High             >239 mg/dl  HDL Cholesterol:        High    >59 mg/dL      Low     <41 mg/dL  LDL CALCULATED:    This screening LDL is a calculated result  It does not have the accuracy of the Direct Measured LDL in the monitoring of patients with hyperlipidemia and/or statin therapy     Direct Measure LDL (BTT617) must be ordered separately in these patients  TRIGLYCERIDES 118 mg/dL  <=150   Specimen collection should occur prior to N-Acetylcysteine or Metamizole administration due to the potential for falsely depressed results  (1) HEMOGLOBIN A1C 27Oct2016 10:44AM [facility] Order Number: [id number]     Test Name Result Flag Reference   HEMOGLOBIN A1C 6 5 % H 4 2-6 3   EST  AVG  GLUCOSE 140 mg/dl       (1) MICROALBUMIN CREATININE RATIO, RANDOM URINE 27Oct2016 10:44AM [facility] Order Number: [id number]     Test Name Result Flag Reference   MICROALBUMIN/ CREAT R 3 mg/g creatinine  0-30   MICROALBUMIN,URINE 6 4 mg/L  0 0-20 0   CREATININE URINE 198 0 mg/dL       (1) AMYLASE 27Oct2016 10:44AM [facility] Order Number: [id number]     Test Name Result Flag Reference   AMYLASE 102 IU/L     - Patient Instructions: This is a fasting blood test  Water,black tea or black  coffee only after 9:00pm the night before test Drink 2 glasses of water the morning of test      (1) LIPASE 27Oct2016 10:44AM [facility] Order Number: [id number]     Test Name Result Flag Reference   LIPASE 175 u/L     - Patient Instructions:  This is a fasting blood test  Water,black tea or black  coffee only after 9:00pm the night before test Drink 2 glasses of water the morning of test

## 2018-01-13 VITALS
SYSTOLIC BLOOD PRESSURE: 128 MMHG | OXYGEN SATURATION: 98 % | HEART RATE: 70 BPM | DIASTOLIC BLOOD PRESSURE: 90 MMHG | BODY MASS INDEX: 30.22 KG/M2 | WEIGHT: 188 LBS | HEIGHT: 66 IN

## 2018-01-13 VITALS
SYSTOLIC BLOOD PRESSURE: 120 MMHG | HEIGHT: 66 IN | BODY MASS INDEX: 30.09 KG/M2 | WEIGHT: 187.25 LBS | OXYGEN SATURATION: 95 % | HEART RATE: 57 BPM | DIASTOLIC BLOOD PRESSURE: 80 MMHG

## 2018-01-13 NOTE — RESULT NOTES
Verified Results  (1) TISSUE EXAM 19Apr2017 11:28AM Ronan Porter     Test Name Result Flag Reference   LAB AP CASE REPORT (Report)     Surgical Pathology Report             Case: [id number]                   Authorizing Provider: Aleida Cates MD  Collected:      04/19/2017 1128        Ordering Location:   [facility] Received:      04/19/2017 1250                    Operating Room                                 Pathologist:      Atif Simmons MD                                Specimens:  A) - Duodenum, duodenum                                        B) - Stomach, stomach   LAB AP FINAL DIAGNOSIS (Report)     A  Duodenum, biopsy:   -Benign small intestinal mucosa with retained villous architecture and no   evidence of increased intraepithelial lymphocytes  -Mild non-specific chronic inflammation of lamina propria seen   -No evidence of dysplasia or malignancy  B  Stomach, biopsy:   -Chronic, inactive gastritis with intramucosal lymphoid follicle  -No evidence of intestinal metaplasia   -No evidence of dysplasia or carcinoma seen  H Pylori stains will be ordered and the result will be issued in the   supplemental report  Electronically signed by Atif Simmons MD on 4/21/2017 at 3:05 PM   LAB AP SURGICAL ADDITIONAL INFORMATION (Report)     These tests were developed and their performance characteristics   determined by [facility] or [address] [place] [address]  They may not be cleared or approved by the [place]  Food and   Drug Administration  The FDA has determined that such clearance or   approval is not necessary  These tests are used for clinical purposes  They should not be regarded as investigational or for research  This   laboratory has been approved by [place] [address], designated as a high-complexity   laboratory and is qualified to perform these tests  LAB AP GROSS DESCRIPTION (Report)     A   The specimen is received in formalin, labeled with the patient's name and hospital number, and is designated duodenum, rule out celiac   disease  The specimen consists of 2 tan soft tissue fragments ranging in   size from 0 2-0 5 cm in greatest dimension  Entirely submitted  One   cassette  B  The specimen is received in formalin, labeled with the patient's name   and hospital number, and is designated Stomach, rule out H  Pylori  The   specimen consists of 4 tan soft tissue fragments ranging in size from   0 3-0 5 cm in greatest dimension  Entirely submitted  One cassette  Note: The estimated total formalin fixation time based upon information   provided by the submitting clinician and the standard processing schedule   is 26 5 hours      [address]   LAB AP CLINICAL INFORMATION      Cold bx  R/o celiac disease   Cold bx  R/o celiac disease

## 2018-01-14 VITALS
BODY MASS INDEX: 30.13 KG/M2 | DIASTOLIC BLOOD PRESSURE: 94 MMHG | HEIGHT: 66 IN | HEART RATE: 72 BPM | WEIGHT: 187.5 LBS | SYSTOLIC BLOOD PRESSURE: 132 MMHG

## 2018-01-15 NOTE — RESULT NOTES
Message   Needs follow-up scheduled in April to discuss these labs with Dr Tomasa Whitney, she and her  will need appointment same time     Verified Results  (1) CBC/PLT/DIFF [id number] 09:31AM [facility] Order Number: [id number]     Test Name Result Flag Reference   WBC COUNT 4 06 Thousand/uL L 4 31-10 16   RBC COUNT 4 45 Million/uL  3 81-5 12   HEMOGLOBIN 12 7 g/dL  11 5-15 4   HEMATOCRIT 38 8 %  34 8-46  1   MCV 87 fL  82-98   MCH 28 5 pg  26 8-34 3   MCHC 32 7 g/dL  31 4-37 4   RDW 13 8 %  11 6-15 1   MPV 10 6 fL  8 9-12 7   PLATELET COUNT 258 Thousands/uL  149-390   nRBC AUTOMATED 0 /100 WBCs     NEUTROPHILS RELATIVE PERCENT 46 %  43-75   LYMPHOCYTES RELATIVE PERCENT 40 %  14-44   MONOCYTES RELATIVE PERCENT 10 %  4-12   EOSINOPHILS RELATIVE PERCENT 3 %  0-6   BASOPHILS RELATIVE PERCENT 1 %  0-1   NEUTROPHILS ABSOLUTE COUNT 1 91 Thousands/? ??L  1 85-7 62   LYMPHOCYTES ABSOLUTE COUNT 1 61 Thousands/? ??L  0 60-4 47   MONOCYTES ABSOLUTE COUNT 0 41 Thousand/? ??L  0 17-1 22   EOSINOPHILS ABSOLUTE COUNT 0 10 Thousand/? ??L  0 00-0 61   BASOPHILS ABSOLUTE COUNT 0 02 Thousands/? ??L  0 00-0 10   - Patient Instructions: This bloodwork is non-fasting  Please drink two glasses of water morning of bloodwork  - Patient Instructions: This bloodwork is non-fasting  Please drink two glasses of water morning of bloodwork       (1) COMPREHENSIVE METABOLIC PANEL [id number] 31:28WW [facility] Order Number: [id number]     Test Name Result Flag Reference   SODIUM 141 mmol/L  136-145   POTASSIUM 3 7 mmol/L  3 5-5 3   CHLORIDE 104 mmol/L  100-108   CARBON DIOXIDE 28 mmol/L  21-32   ANION GAP (CALC) 9 mmol/L  4-13   BLOOD UREA NITROGEN 24 mg/dL  5-25   CREATININE 1 19 mg/dL  0 60-1 30   Standardized to IDMS reference method   CALCIUM 8 9 mg/dL  8 3-10 1   BILI, TOTAL 0 54 mg/dL  0 20-1 00   ALK PHOSPHATAS 102 U/L     ALT (SGPT) 21 U/L  12-78   AST(SGOT) 14 U/L  5-45   ALBUMIN 3 2 g/dL L 3 5-5 0 TOTAL PROTEIN 7 7 g/dL  6 4-8 2   eGFR Non-African American 54 2 ml/min/1 73sq m     - Patient Instructions: This is a fasting blood test  Water,black tea or black  coffee only after 9:00pm the night before test Drink 2 glasses of water the morning of test   National Kidney Disease Education Program recommendations are as follows:  GFR calculation is accurate only with a steady state creatinine  Chronic Kidney disease less than 60 ml/min/1 73 sq  meters  Kidney failure less than 15 ml/min/1 73 sq  meters  GLUCOSE FASTING 103 mg/dL H 65-99     (1) HEMOGLOBIN A1C [id number] 09:31AM [facility] Order Number: [id number]     Test Name Result Flag Reference   HEMOGLOBIN A1C 6 6 % H 4 2-6 3   EST  AVG  GLUCOSE 143 mg/dl       (1) LIPID PANEL, FASTING [id number] 09:31AM [facility] Order Number: [id number]     Test Name Result Flag Reference   CHOLESTEROL 226 mg/dL H    HDL,DIRECT 58 mg/dL  40-60   Specimen collection should occur prior to Metamizole administration due to the potential for falsely depressed results  LDL CHOLESTEROL CALCULATED 150 mg/dL H 0-100   - Patient Instructions: This is a fasting blood test  Water,black tea or black  coffee only after 9:00pm the night before test   Drink 2 glasses of water the morning of test     - Patient Instructions: This is a fasting blood test  Water,black tea or black  coffee only after 9:00pm the night before test Drink 2 glasses of water the morning of test   Triglyceride:         Normal              <150 mg/dl       Borderline High    150-199 mg/dl       High               200-499 mg/dl       Very High          >499 mg/dl  Cholesterol:         Desirable        <200 mg/dl      Borderline High  200-239 mg/dl      High             >239 mg/dl  HDL Cholesterol:        High    >59 mg/dL      Low     <41 mg/dL  LDL CALCULATED:    This screening LDL is a calculated result    It does not have the accuracy of the Direct Measured LDL in the monitoring of patients with hyperlipidemia and/or statin therapy  Direct Measure LDL (JSD432) must be ordered separately in these patients  TRIGLYCERIDES 90 mg/dL  <=150   Specimen collection should occur prior to N-Acetylcysteine or Metamizole administration due to the potential for falsely depressed results       (1) MICROALBUMIN CREATININE RATIO, RANDOM URINE [id number] 09:31AM [facility] Order Number: [id number]     Test Name Result Flag Reference   MICROALBUMIN/ CREAT R 7 mg/g creatinine  0-30   MICROALBUMIN,URINE 9 8 mg/L  0 0-20 0   CREATININE URINE 134 0 mg/dL

## 2018-01-16 NOTE — RESULT NOTES
Verified Results  (1) URINE CULTURE [id number] 10:05AM [facility] Order Number: [id number]     Test Name Result Flag Reference   CLINICAL REPORT (Report)     Test:        Urine culture  Specimen Type:   Urine  Specimen Date:   4/10/2017 10:05 AM  Result Date:    4/11/2017 1:44 PM  Result Status:   Final result  Resulting Lab:   [place] [address]            Tel: [phone]      CULTURE                                       ------------------                                   No Growth <1000 cfu/mL

## 2018-01-16 NOTE — PROGRESS NOTES
Assessment    1  Encounter for preventive health examination (V70 0) (Z00 00)    Plan  Health Maintenance    · Begin a limited exercise program ; Status:Complete;   Done: 73Fvv5596   · There are many exercise options for seniors ; Status:Complete;   Done: 11Rui2183   · *VB - Fall Risk Assessment  (Dx Z13 89 Screen for Neurologic Disorder);  Status:Complete;   Done: 80OAZ2245 12:00AM   · *VB - Urinary Incontinence Screen (Dx Z13 89 Screen for UI); Status:Complete -  Retrospective By Protocol Authorization;   Done: 43TFO1581 12:00AM   · *VB-Depression Screening; Status:Complete - Retrospective By Protocol Authorization;    Done: 53XTC9961 12:00AM  Need for influenza vaccination    · Stop: Fluzone High-Dose 0 5 ML Intramuscular Suspension Prefilled  Syringe  Need for pneumococcal vaccine    · Stop: Pneumovax 23 25 MCG/0 5ML Injection Injectable    Discussion/Summary  Impression: Initial Annual Wellness Visit, with preventive exam as well as age and risk appropriate counseling completed  Cardiovascular screening and counseling: screening is current  Diabetes screening and counseling: screening is current  Colorectal cancer screening and counseling: screening is current  Breast cancer screening and counseling: screening is current  Immunizations: the risks and benefits of influenza vaccination were discussed with the patient, the patient declines the influenza vaccination and the risks and benefits of pneumococcal vaccination were discussed with the patient  Advance Directive Planning: she was encouraged to follow-up with me to discuss her questions and/or decisions, A copy of a 5 wishes form was given to him and his wife  Patient Discussion: plan discussed with the patient, follow-up visit needed in one year  Self Referrals: No      History of Present Illness  Welcome to Medicare and Wellness Visits:  The patient is being seen for the initial annual wellness visit and Her questionnaire was reviewed and a copy is in the chart  Medicare Screening and Risk Factors   Hospitalizations: no previous hospitalizations  Medicare Screening Tests Risk Questions   Abdominal aortic aneurysm risk assessment: none indicated  Drug and Alcohol Use: The patient has never smoked cigarettes  The patient reports never drinking alcohol  She has never used illicit drugs  Diet and Physical Activity: Current diet includes frequent junk food, 1 servings of fruit per day, 1 servings of meat per day and 2 servings of water  The patient does not exercise  Mood Disorder and Cognitive Impairment Screening: She denies feeling down, depressed, or hopeless over the past two weeks  She denies feeling little interest or pleasure in doing things over the past two weeks  Cognitive impairment screening: denies difficulty learning/retaining new information, denies difficulty handling complex tasks, denies difficulty with reasoning, denies difficulty with spatial ability and orientation, denies difficulty with language and denies difficulty with behavior  Functional Ability/Level of Safety: Hearing is normal bilaterally and a hearing aid is not used  She denies hearing difficulties  The patient is currently able to do activities of daily living without limitations, able to do instrumental activities of daily living without limitations, able to participate in social activities without limitations and able to drive without limitations  Activities of daily living details: does not need help using the phone, no transportation help needed, does not need help shopping, no meal preparation help needed, does not need help doing housework, does not need help doing laundry, does not need help managing medications and does not need help managing money   Fall risk factors:  no polypharmacy, no alcohol use, no mobility impairment, no antidepressant use, no deconditioning, no postural hypotension, no sedative use, no visual impairment, no urinary incontinence, no antihypertensive use, no cognitive impairment, up and go test was normal and no previous fall  Home safety risk factors:  no grab bars in the bathroom, but no unfamiliar surroundings, no loose rugs, no poor household lighting, no uneven floors, no household clutter and handrails on the stairs  Advance Directives: Advance directives: no living will, no durable power of  for health care directives and no advance directives  Co-Managers and Medical Equipment/Suppliers: See Patient Care Team      Review of Systems    Cardiovascular: no chest pain  Respiratory: no shortness of breath  Active Problems     1  Allergic rhinitis (477 9) (J30 9)   2  Irritable bowel syndrome without diarrhea (564 1) (K58 9)   3  Multiple nevi (216 9) (D22 9)   4  Screening for colon cancer (V76 51) (Z12 11)   5  Screening for osteoporosis (V82 81) (Z13 820)   6  Visit for screening mammogram (V76 12) (Z12 31)    Type 2 diabetes mellitus (250 00) (E11 9)       Hyperlipidemia (272 4) (E78 5)       Hypertension, benign (401 1) (I10)       Generalized abdominal pain (789 07) (R10 84)          Past Medical History    · History of HNP (herniated nucleus pulposus), lumbar (722 10) (M51 26)   · History of Osteoarthritis of lumbar spine (721 3) (M47 816)    The active problems and past medical history were reviewed and updated today  Surgical History    · History of Parathyroid Surgery   · History of Umbilical Hernia Repair    The surgical history was reviewed and updated today  Family History  Mother    · Family history of Breast cancer, female  Brother    · Family history of Living and Healthy  Family History    · Family history of hypertension (V17 49) (Z82 49)    The family history was reviewed and updated today  Social History    ·    · Never a smoker   · No alcohol use   · Retired   ·  for [employer]   · Two children  The social history was reviewed and updated today  Current Meds   1  Azelastine HCl - 0 05 % Ophthalmic Solution; INSTILL 1 DROP IN BOTH EYES EVERY   12 HOURS DAILY; Therapy: 27Apr2015 to (Evaluate:47Ohg4575)  Requested for: ; Last   Rx:27Apr2015 Ordered   2  FreeStyle Lite Test In Vitro Strip; TEST TWICE DAILY; Therapy: 26TFB9211 to (Evaluate:17Oct2015)  Requested for: 45SDS0021; Last   Rx:13Jee2562 Ordered   3  Hyoscyamine Sulfate ER 0 375 MG Oral Tablet Extended Release 12 Hour; take 1 tablet   every twelve hours; Therapy: 65AFK4335 to (Last Rx:05Feb2016)  Requested for: 90JJL6806 Ordered   4  Januvia 100 MG Oral Tablet; TAKE 1 TABLET DAILY  Requested for: 72MWK0727; Last   Rx:37Xoh6218 Ordered   5  Levocetirizine Dihydrochloride 5 MG Oral Tablet; TAKE 1 TABLET DAILY IN THE   EVENING; Therapy: 27Apr2015 to 799 8384)  Requested for: ; Last   Rx:27Apr2015 Ordered   6  Lovastatin 10 MG Oral Tablet; TAKE 1 TABLET AT BEDTIME; Therapy: 05HYB4596 to (Evaluate:30Apr2016)  Requested for: 04QKS9654; Last   Rx:02Nov2015 Ordered   7  MetFORMIN HCl - 1000 MG Oral Tablet; take one tablet by mouth twice daily; Therapy: 99REZ6500 to (Evaluate:07Jan2017)  Requested for: 78GHL2514; Last   Rx:13Jan2016 Ordered   8  Pantoprazole Sodium 40 MG Oral Tablet Delayed Release; TAKE 1 TABLET DAILY; Therapy: 21LFR7291 to (Evaluate:86Emv3900)  Requested for: 19QAZ7646; Last   Rx:02Beh1998 Ordered   9  Quinapril-Hydrochlorothiazide 10-12 5 MG Oral Tablet; Take 1 tablet daily; Therapy: 71DIF4837 to (95 [id number])  Requested for: 10Aug2016; Last   Rx:10Aug2016 Ordered    The medication list was reviewed and updated today  Allergies    1  Codeine Derivatives    2  FRUIT   3  Almonds   4  Other    Physical Exam    Constitutional   General appearance: No acute distress, well appearing and well nourished  Psychiatric   Judgment and insight: Normal     Orientation to person, place, and time: Normal     Recent and remote memory: Intact  Mood and affect: Normal        Results/Data  PHQ-2 Adult Depression Screening 20Sep2016 03:48PM [facility]     Test Name Result Flag Reference   PHQ-2 Adult Depression Score 0     Over the last two weeks, how often have you been bothered by any of the following problems? Little interest or pleasure in doing things: Not at all - 0  Feeling down, depressed, or hopeless: Not at all - 0   PHQ-2 Adult Depression Screening Negative       *VB-Depression Screening 20Sep2016 12:00AM Petty Borer     Test Name Result Flag Reference   Depression Scale Result      Depression Screen - Negative For Symptoms     *VB - Urinary Incontinence Screen (Dx Z13 89 Screen for UI) 86IAM8749 12:00AM Petty Borer     Test Name Result Flag Reference   Urinary Incontinence Assessment 20Sep2016       *VB - Fall Risk Assessment  (Dx Z13 89 Screen for Neurologic Disorder) 28EEY8110 12:00AM Petty Borer     Test Name Result Flag Reference   Falls Risk      No falls in the past year       Health Management  Type 2 diabetes mellitus   *VB - Eye Exam; every 2 years; Last 29Jul2016; Next Due: 55RSD3283; Active  Visit for screening mammogram   Digital Bilateral Screening Mammogram With CAD; every 2 years; Last 23Oct2015; Next  Due: 23GBE6707;  Active    Future Appointments    Date/Time Provider Specialty Site   01/23/2017 01:20 PM Jovanny Dior MD Internal Medicine [facility] INTERNAL MED     Signatures   Electronically signed by : Rush Valles MD; Sep 20 2016  3:55PM EST                       (Author)

## 2018-01-17 NOTE — RESULT NOTES
Verified Results  (1) LIPID PANEL, FASTING [id number] 09:10AM Jovanny Dior   Triglyceride:         Normal              <150 mg/dl       Borderline High    150-199 mg/dl       High               200-499 mg/dl       Very High          >499 mg/dl  Cholesterol:         Desirable        <200 mg/dl      Borderline High  200-239 mg/dl      High             >239 mg/dl  HDL Cholesterol:        High    >59 mg/dL      Low     <41 mg/dL  LDL CALCULATED:    This screening LDL is a calculated result  It does not have the accuracy of the Direct Measured LDL in the monitoring of patients with hyperlipidemia and/or statin therapy  Direct Measure LDL (WVN793) must be ordered separately in these patients  Test Name Result Flag Reference   CHOLESTEROL 190 mg/dL     HDL,DIRECT 53 mg/dL  40-60   LDL CHOLESTEROL CALCULATED 108 mg/dL H 0-100   TRIGLYCERIDES 143 mg/dL  <=150     (1) COMPREHENSIVE METABOLIC PANEL [id number] 08:28SB Meghan, [address] [facility] Kidney Disease Education Program recommendations are as follows:  GFR calculation is accurate only with a steady state creatinine  Chronic Kidney disease less than 60 ml/min/1 73 sq  meters  Kidney failure less than 15 ml/min/1 73 sq  meters       Test Name Result Flag Reference   GLUCOSE,RANDM 100 mg/dL     SODIUM 140 mmol/L  136-145   POTASSIUM 3 7 mmol/L  3 5-5 3   CHLORIDE 105 mmol/L  100-108   CARBON DIOXIDE 27 mmol/L  21-32   ANION GAP (CALC) 8 mmol/L  4-13   BLOOD UREA NITROGEN 16 mg/dL  5-25   CREATININE 0 99 mg/dL  0 60-1 30   Standardized to IDMS reference method   CALCIUM 8 6 mg/dL  8 3-10 1   BILI, TOTAL 0 28 mg/dL  0 20-1 00   ALK PHOSPHATAS 85 U/L     ALT (SGPT) 18 U/L  12-78   AST(SGOT) 11 U/L  5-45   ALBUMIN 3 2 g/dL L 3 5-5 0   TOTAL PROTEIN 7 5 g/dL  6 4-8 2   eGFR Non-African American      >60 0 ml/min/1 73sq m

## 2018-01-23 ENCOUNTER — ALLSCRIPTS OFFICE VISIT (OUTPATIENT)
Dept: OTHER | Facility: OTHER | Age: 73
End: 2018-01-23

## 2018-01-23 VITALS
SYSTOLIC BLOOD PRESSURE: 108 MMHG | BODY MASS INDEX: 30.22 KG/M2 | WEIGHT: 188 LBS | HEART RATE: 84 BPM | DIASTOLIC BLOOD PRESSURE: 66 MMHG | HEIGHT: 66 IN | OXYGEN SATURATION: 95 % | TEMPERATURE: 97.5 F

## 2018-01-23 NOTE — RESULT NOTES
Verified Results  * XR WRIST 3+ VIEW LEFT [id number] 09:36AM Gera Roche Order Number: [id number]     Test Name Result Flag Reference   XR WRIST 3+ VW LEFT (Report)     LEFT WRIST     INDICATION: Posterior wrist pain     COMPARISON: None     VIEWS: PA, lateral, and oblique     IMAGES: 3     FINDINGS:     There is no acute fracture or dislocation  There is widening of the scapholunate interval and mild proximal migration of the capitate  Slight dorsal tilt of the lunate on the lateral projection  Mild degenerative changes of 1st carpometacarpal joint  No lytic or blastic lesions are seen  Soft tissues are unremarkable  IMPRESSION:     Widening of the scapholunate interval suggestive of prior scapholunate ligament injury  Additionally there is mild proximal migration of the capitate suggestive of scapholunate advanced collapse         Workstation performed: [id number]     Signed by:   Alexandre Flores MD   12/21/17

## 2018-01-24 NOTE — PROGRESS NOTES
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)    Plan   Left wrist pain    · Meloxicam 15 MG Oral Tablet; TAKE 1 TABLET DAILY WITH FOOD    Discussion/Summary   Impression: left wrist pain  Currently, the condition is moderate in severity and worsening  Medication changes are as documented in orders  Treatment plan includes wrist splint  Patient discussion: discussed with the patient, discussed with the patient's family, 20 minute visit, greater than half of the time was spent on counseling  80-year-old female with left wrist pain  Discussed with patient and her accompanying  physical exam findings, radiographs, impression, and plan  Given patient has history of trauma with mechanism of [address] injury, in the context of radiographic findings of scapho-lunate interval increase there is high probability of scaphoid lunate interosseous ligament injury  Radiographs are also noted for proximal migration of the capitate suggesting progression of her pathology to scaphoid lunate advanced collapse  At this time will place patient in wrist splint, and she is start taking meloxicam once daily with food  Discussed with patient that I will seek consultation from hand surgeon regarding course of treatment and inform her of treatment plan  The patient was counseled regarding diagnostic results,-- instructions for management,-- risk factor reductions,-- prognosis,-- impressions,-- importance of compliance with treatment  total time of encounter was 20 minutes-- and-- Greater than 10 minutes was spent counseling  Possible side effects of new medications were reviewed with the patient/guardian today  The treatment plan was reviewed with the patient/guardian  The patient/guardian understands and agrees with the treatment plan      Chief Complaint   1   Wrist Pain  My left wrist hurts      History of Present Illness   Wrist Problem: The patient is being seen for an initial evaluation of a wrist problem  The patient is right hand dominant  Symptoms:  pain-- and-- tenderness, but-- no stiffness-- and-- no decreased range of motion  The patient is currently experiencing symptoms  Exacerbating factors:  use of the hand,-- use of the wrist,-- repetitive use-- and-- gripping  Relieving factors:  Rest  Associated symptoms:  no numbness in the hand-- and-- no weakness of the hand  HPI: 77-year-old right-hand-dominant female accompanied by her  for evaluation left wrist pain more than 1 year duration  Patient states that while at the [facility] shortly after Thanksgiving of 2016 she tripped on the curb and fell with her left hand outstretched  She immediately had pain at the volar aspect the wrist that was described as achy  Pain was localized, nonradiating, and rated as mild in intensity  She states that over the past year the pain has progressed and now also involves dorsal aspect her wrist, rated at 5 out of 10 intensity, constant, and worse with wrist and hand movements even with activities such as lifting pots and utensils while cooking  She has not been taking anything for symptoms  She denies any associated numbness/tingling  Overall her symptoms have gradually worsened since the time of injury  Review of Systems        Constitutional: No fever, no chills, feels well, no tiredness, no recent weight gain or loss  Eyes: No complaints of eyesight problems, no red eyes  ENT: no loss of hearing, no nosebleeds, no sore throat  Cardiovascular: No complaints of chest pain, no palpitations, no leg claudication or lower extremity edema  Respiratory: no compliants of shortness of breath, no wheezing, no cough  Gastrointestinal: no complaints of abdominal pain, no constipation, no nausea or diarrhea, no vomiting, no bloody stools  Genitourinary: no complaints of dysuria, no incontinence        Musculoskeletal: as noted in HPI       Integumentary: no complaints of skin rash or lesion, no itching or dry skin, no skin wounds  Neurological: no complaints of headache, no confusion, no numbness or tingling, no dizziness  Endocrine: No complaints of muscle weakness, no feelings of weakness, no frequent urination, no excessive thirst       Psychiatric: No suicidal thoughts, no anxiety, no feelings of depression  ROS reviewed  Active Problems   1  Advance directive discussed with patient (V65 49) (Z71 89)   2  Allergic rhinitis (477 9) (J30 9)   3  Chronic bilateral low back pain with right-sided sciatica (724 2,724 3,338 29)     (M54 41,G89 29)   4  Colon cancer screening (V76 51) (Z12 11)   5  Generalized abdominal pain (789 07) (R10 84)   6  History of colon polyps (V12 72) (Z86 010)   7  Hyperlipidemia (272 4) (E78 5)   8  Hypertension, benign (401 1) (I10)   9  Irritable bowel syndrome without diarrhea (564 1) (K58 9)   10  Left wrist pain (719 43) (M25 532)   11  Multiple nevi (216 9) (D22 9)   12  Need for prophylactic vaccination against Streptococcus pneumoniae (pneumococcus)      (V03 82) (Z23)   13  Need for prophylactic vaccination and inoculation against influenza (V04 81) (Z23)   14  Screening for genitourinary condition (V81 6) (Z13 89)   15  Screening for osteoporosis (V82 81) (Z13 820)   16  Type 2 diabetes mellitus (250 00) (E11 9)   17  UTI symptoms (788 99) (R39 9)    Past Medical History    · History of arthritis (V13 4) (Z87 39)   · History of HNP (herniated nucleus pulposus), lumbar (722 10) (M51 26)   · History of Osteoarthritis of lumbar spine (721 3) (M47 816)     The active problems and past medical history were reviewed and updated today  Surgical History    · History of Parathyroid Surgery   · History of Umbilical Hernia Repair     The surgical history was reviewed and updated today         Family History   Mother    · Family history of Breast cancer, female  Brother    · Family history of Living and Healthy  Family History    · Family history of arthritis (V17 7) (Z82 61)   · Family history of hypertension (V17 49) (Z82 49)     The family history was reviewed and updated today  Social History    ·    · Never smoked cigarettes (V49 89) (Z78 9)   · No alcohol use   · Retired   ·  for [employer]   · Two children  The social history was reviewed and updated today  The social history was reviewed and is unchanged  Current Meds    1  Diclofenac Sodium 75 MG Oral Tablet Delayed Release; Take 1 tablet twice daily; Therapy: 44FPB6539 to (Evaluate:23Apr2017)  Requested for: 18GCK8529; Last     Rx:23Jan2017 Ordered   2  FreeStyle Lite Test In [employer]; test twice daily; Therapy: 05YPA9868 to (Evaluate:23Dec2016)  Requested for: 99IOM5404; Last     Rx:03Nov2016 Ordered   3  Levocetirizine Dihydrochloride 5 MG Oral Tablet; TAKE 1 TABLET DAILY IN THE     EVENING; Therapy: 27Apr2015 to 01 72 64 30 83)  Requested for: ; Last     Rx:27Apr2015 Ordered   4  Levsin 0 125 MG Oral Tablet; TAKE 1 TABLET 3-4 TIMES DAILY AS NEEDED; Therapy: 48Rrn7922 to (Evaluate:10Jun2017)  Requested for: 57Ffa8660; Last     Rx:11Apr2017 Ordered   5  Lovastatin 10 MG Oral Tablet; TAKE 1 TABLET AT BEDTIME; Therapy: 47POW6177 to (Evaluate:30Jan2018)  Requested for: 13KAK7422; Last     Rx:01Nov2017 Ordered   6  MetFORMIN HCl - 1000 MG Oral Tablet; take one tablet by mouth twice daily; Therapy: 22ZPJ4767 to [phone])  Requested for: 25CMN1522; Last     Rx:25Jan2017 Ordered   7  Pantoprazole Sodium 40 MG Oral Tablet Delayed Release; Take 1 tablet daily; Therapy: 41VVX5158 to (Michael Arellano)  Requested for: 61JRS4321; Last     Rx:23Jan2017 Ordered   8  Quinapril-Hydrochlorothiazide 10-12 5 MG Oral Tablet; Take 1 tablet daily; Therapy: 18ZFW9309 to (Evaluate:04Jun2018)  Requested for: 29RBY0183; Last     Rx:77Ivw0681 Ordered    Allergies   1   Codeine Derivatives  2  FRUIT   3  Almonds   4  Other    Vitals   Signs   Heart Rate: 63  Systolic: 437  Diastolic: 87  Height: 5 ft 6 in  Weight: 189 lb 2 oz  BMI Calculated: 30 53  BSA Calculated: 1 95    Physical Exam         Left Elbow: Inspection and Palpation: Normal       ROM: Normal       Strength: Normal       Stability: Normal     Left Wrist: Appearance: no deformity,-- no erythema-- and-- no swelling  Tenderness: None except the dorsal aspect of the capitate-- and-- scapholunate interval, but-- not the volar aspect of the scaphoid,-- not the dorsal aspect of the scaphoid,-- not the carpal tunnel,-- not the lunotriquetral interval,-- not the TFCC-- and-- not the ulnar styloid  Palpatory findings include no crepitus,-- no thumb crepitus-- and-- no decreased sensation to light touch diffusely  ROM: Full except as noted: Flexion: restricted AROM 30 degrees and restricted PROM 45 degrees  Extension: restricted AROM 45 degrees and restricted PROM 75 degrees  Motor: Normal except as noted: 4+/5  strength  Decreased  strength  Special Tests: equivocal Calzada's Test, but-- negative ulnar grind test for TFCC pathology  Constitutional - General appearance: Normal       Psychiatric - Orientation to person, place, and time: Normal -- Mood and affect: Normal       Results/Data   I personally reviewed the films/images/results in the office today  My interpretation follows  X-ray Review X-ray of left hand showing increased scapholunate interval and proximal migration of capitate        Future Appointments      Date/Time Provider Specialty Site   04/09/2018 11:20 AM Ronna Han MD Internal Medicine [facility] INTERNAL MED     Signatures    Electronically signed by : Dalia Blackman DO; Jan 23 2018  5:22PM EST                       (Author)

## 2018-01-29 DIAGNOSIS — E78.2 MIXED HYPERLIPIDEMIA: Primary | ICD-10-CM

## 2018-01-29 RX ORDER — LOVASTATIN 10 MG/1
TABLET ORAL
Qty: 90 TABLET | Refills: 0 | Status: SHIPPED | OUTPATIENT
Start: 2018-01-29 | End: 2018-01-30 | Stop reason: SDUPTHER

## 2018-01-30 DIAGNOSIS — E78.2 MIXED HYPERLIPIDEMIA: ICD-10-CM

## 2018-01-30 RX ORDER — LOVASTATIN 10 MG/1
TABLET ORAL
Qty: 90 TABLET | Refills: 0 | Status: SHIPPED | OUTPATIENT
Start: 2018-01-30 | End: 2018-01-31 | Stop reason: SDUPTHER

## 2018-01-31 ENCOUNTER — TELEPHONE (OUTPATIENT)
Dept: INTERNAL MEDICINE CLINIC | Facility: CLINIC | Age: 73
End: 2018-01-31

## 2018-01-31 DIAGNOSIS — E78.2 MIXED HYPERLIPIDEMIA: ICD-10-CM

## 2018-01-31 RX ORDER — LOVASTATIN 10 MG/1
10 TABLET ORAL
Qty: 90 TABLET | Refills: 1 | Status: SHIPPED | OUTPATIENT
Start: 2018-01-31 | End: 2018-07-30 | Stop reason: SDUPTHER

## 2018-02-11 DIAGNOSIS — E11.9 TYPE 2 DIABETES MELLITUS WITHOUT COMPLICATION, WITHOUT LONG-TERM CURRENT USE OF INSULIN (HCC): Primary | ICD-10-CM

## 2018-04-09 ENCOUNTER — OFFICE VISIT (OUTPATIENT)
Dept: INTERNAL MEDICINE CLINIC | Facility: CLINIC | Age: 73
End: 2018-04-09
Payer: MEDICARE

## 2018-04-09 ENCOUNTER — TRANSCRIBE ORDERS (OUTPATIENT)
Dept: LAB | Facility: CLINIC | Age: 73
End: 2018-04-09

## 2018-04-09 ENCOUNTER — APPOINTMENT (OUTPATIENT)
Dept: LAB | Facility: CLINIC | Age: 73
End: 2018-04-09
Payer: MEDICARE

## 2018-04-09 VITALS
DIASTOLIC BLOOD PRESSURE: 72 MMHG | HEART RATE: 60 BPM | SYSTOLIC BLOOD PRESSURE: 130 MMHG | OXYGEN SATURATION: 98 % | RESPIRATION RATE: 18 BRPM | HEIGHT: 66 IN | BODY MASS INDEX: 29.96 KG/M2 | WEIGHT: 186.4 LBS

## 2018-04-09 DIAGNOSIS — E11.9 TYPE 2 DIABETES MELLITUS WITHOUT COMPLICATION, WITHOUT LONG-TERM CURRENT USE OF INSULIN (HCC): ICD-10-CM

## 2018-04-09 DIAGNOSIS — K58.9 IRRITABLE BOWEL SYNDROME WITHOUT DIARRHEA: ICD-10-CM

## 2018-04-09 DIAGNOSIS — E11.9 TYPE 2 DIABETES MELLITUS WITHOUT COMPLICATIONS (HCC): ICD-10-CM

## 2018-04-09 DIAGNOSIS — I10 HYPERTENSION, BENIGN: ICD-10-CM

## 2018-04-09 DIAGNOSIS — E11.9 TYPE 2 DIABETES MELLITUS WITHOUT COMPLICATION, WITHOUT LONG-TERM CURRENT USE OF INSULIN (HCC): Primary | ICD-10-CM

## 2018-04-09 DIAGNOSIS — E78.2 MIXED HYPERLIPIDEMIA: ICD-10-CM

## 2018-04-09 LAB
ALBUMIN SERPL BCP-MCNC: 3.3 G/DL (ref 3.5–5)
ALP SERPL-CCNC: 104 U/L (ref 46–116)
ALT SERPL W P-5'-P-CCNC: 18 U/L (ref 12–78)
ANION GAP SERPL CALCULATED.3IONS-SCNC: 5 MMOL/L (ref 4–13)
AST SERPL W P-5'-P-CCNC: 18 U/L (ref 5–45)
BASOPHILS # BLD AUTO: 0.02 THOUSANDS/ΜL (ref 0–0.1)
BASOPHILS NFR BLD AUTO: 1 % (ref 0–1)
BILIRUB SERPL-MCNC: 0.44 MG/DL (ref 0.2–1)
BUN SERPL-MCNC: 17 MG/DL (ref 5–25)
CALCIUM SERPL-MCNC: 9.2 MG/DL
CHLORIDE SERPL-SCNC: 106 MMOL/L (ref 100–108)
CHOLEST SERPL-MCNC: 181 MG/DL (ref 50–200)
CO2 SERPL-SCNC: 29 MMOL/L (ref 21–32)
CREAT SERPL-MCNC: 1.14 MG/DL (ref 0.6–1.3)
CREAT UR-MCNC: 103 MG/DL
EOSINOPHIL # BLD AUTO: 0.3 THOUSAND/ΜL (ref 0–0.61)
EOSINOPHIL NFR BLD AUTO: 8 % (ref 0–6)
ERYTHROCYTE [DISTWIDTH] IN BLOOD BY AUTOMATED COUNT: 13.7 % (ref 11.6–15.1)
EST. AVERAGE GLUCOSE BLD GHB EST-MCNC: 157 MG/DL
GFR SERPL CREATININE-BSD FRML MDRD: 56 ML/MIN/1.73SQ M
GLUCOSE P FAST SERPL-MCNC: 105 MG/DL (ref 65–99)
HBA1C MFR BLD: 7.1 % (ref 4.2–6.3)
HCT VFR BLD AUTO: 40.6 % (ref 34.8–46.1)
HDLC SERPL-MCNC: 56 MG/DL (ref 40–60)
HGB BLD-MCNC: 13.7 G/DL (ref 11.5–15.4)
LDLC SERPL CALC-MCNC: 104 MG/DL (ref 0–100)
LYMPHOCYTES # BLD AUTO: 1.47 THOUSANDS/ΜL (ref 0.6–4.47)
LYMPHOCYTES NFR BLD AUTO: 38 % (ref 14–44)
MCH RBC QN AUTO: 29.3 PG (ref 26.8–34.3)
MCHC RBC AUTO-ENTMCNC: 33.7 G/DL (ref 31.4–37.4)
MCV RBC AUTO: 87 FL (ref 82–98)
MICROALBUMIN UR-MCNC: 5.2 MG/L (ref 0–20)
MICROALBUMIN/CREAT 24H UR: 5 MG/G CREATININE (ref 0–30)
MONOCYTES # BLD AUTO: 0.31 THOUSAND/ΜL (ref 0.17–1.22)
MONOCYTES NFR BLD AUTO: 8 % (ref 4–12)
NEUTROPHILS # BLD AUTO: 1.78 THOUSANDS/ΜL (ref 1.85–7.62)
NEUTS SEG NFR BLD AUTO: 45 % (ref 43–75)
NONHDLC SERPL-MCNC: 125 MG/DL
NRBC BLD AUTO-RTO: 0 /100 WBCS
PLATELET # BLD AUTO: 281 THOUSANDS/UL (ref 149–390)
PMV BLD AUTO: 10.5 FL (ref 8.9–12.7)
POTASSIUM SERPL-SCNC: 3.8 MMOL/L (ref 3.5–5.3)
PROT SERPL-MCNC: 8.2 G/DL (ref 6.4–8.2)
RBC # BLD AUTO: 4.67 MILLION/UL (ref 3.81–5.12)
SODIUM SERPL-SCNC: 140 MMOL/L (ref 136–145)
TRIGL SERPL-MCNC: 107 MG/DL
WBC # BLD AUTO: 3.89 THOUSAND/UL (ref 4.31–10.16)

## 2018-04-09 PROCEDURE — 82043 UR ALBUMIN QUANTITATIVE: CPT

## 2018-04-09 PROCEDURE — 82570 ASSAY OF URINE CREATININE: CPT

## 2018-04-09 PROCEDURE — 80061 LIPID PANEL: CPT

## 2018-04-09 PROCEDURE — 85025 COMPLETE CBC W/AUTO DIFF WBC: CPT

## 2018-04-09 PROCEDURE — 80053 COMPREHEN METABOLIC PANEL: CPT

## 2018-04-09 PROCEDURE — 83036 HEMOGLOBIN GLYCOSYLATED A1C: CPT

## 2018-04-09 PROCEDURE — 99214 OFFICE O/P EST MOD 30 MIN: CPT | Performed by: INTERNAL MEDICINE

## 2018-04-09 RX ORDER — HYOSCYAMINE SULFATE 0.125 MG
1 TABLET ORAL
COMMUNITY
Start: 2017-04-11 | End: 2018-06-27 | Stop reason: SDUPTHER

## 2018-04-09 NOTE — PROGRESS NOTES
Assessment/Plan:     Chronic problems appear to be stable but we need the labs  They are going right downstairs after leaving here  Continue current medications for now  Continue follow-up with specialist   Encouraged diet and exercise  Followup scheduled per orders  No problem-specific Assessment & Plan notes found for this encounter  Diagnoses and all orders for this visit:    Type 2 diabetes mellitus without complication, without long-term current use of insulin (HCC)  -     CBC and differential; Future  -     Comprehensive metabolic panel; Future  -     HEMOGLOBIN A1C W/ EAG ESTIMATION; Future  -     Lipid panel; Future  -     Microalbumin / creatinine urine ratio; Future    Hypertension, benign    Mixed hyperlipidemia    Irritable bowel syndrome without diarrhea    Other orders  -     hyoscyamine (LEVSIN) 0 125 MG tablet; Take 1 tablet by mouth          Subjective:      Patient ID: Hubert Malhotra is a 67 y o  female  Patient comes in today for routine follow-up  She is with her   She states she is doing well and has no new complaints  However, they forgot to go further blood work  She feels her sugars have been controlled  Her blood pressure has been controlled  Her cholesterol had been controlled but we need the lab results  No recent troubles with her irritable bowel  She does still take medicine for this from her GI doctor          ALLERGIES:  Allergies   Allergen Reactions    Codeine Other (See Comments)     Dizzy, lightheaded    Nuts        CURRENT MEDICATIONS:    Current Outpatient Prescriptions:     hyoscyamine (LEVSIN) 0 125 MG tablet, Take 1 tablet by mouth, Disp: , Rfl:     levocetirizine (XYZAL) 5 MG tablet, Take 5 mg by mouth every evening, Disp: , Rfl:     lovastatin (MEVACOR) 10 MG tablet, Take 1 tablet (10 mg total) by mouth daily at bedtime for 180 days, Disp: 90 tablet, Rfl: 1    metFORMIN (GLUCOPHAGE) 1000 MG tablet, TAKE 1 TABLET TWICE DAILY, Disp: 180 tablet, Rfl: 3    pantoprazole (PROTONIX) 40 mg tablet, Take 40 mg by mouth daily, Disp: , Rfl:     quinapril-hydrochlorothiazide (ACCURETIC) 10-12 5 MG per tablet, Take 1 tablet by mouth daily, Disp: , Rfl:     ACTIVE PROBLEM LIST:  Patient Active Problem List   Diagnosis    Allergic rhinitis    Hyperlipidemia    Hypertension, benign    Irritable bowel syndrome without diarrhea    Type 2 diabetes mellitus without complication, without long-term current use of insulin (HCC)       PAST MEDICAL HISTORY:  Past Medical History:   Diagnosis Date    Arthritis     Diabetes mellitus ([address] )     HNP (herniated nucleus pulposus), lumbar     Hyperlipidemia     Hypertension     IBS (irritable bowel syndrome)     Osteoarthritis        PAST SURGICAL HISTORY:  Past Surgical History:   Procedure Laterality Date    PARATHYROID GLAND SURGERY      resolved 05/12    MS ESOPHAGOGASTRODUODENOSCOPY TRANSORAL DIAGNOSTIC N/A 4/19/2017    Procedure: EGD AND COLONOSCOPY;  Surgeon: Lolita Rosas MD;  Location: [facility]; Service: Gastroenterology    UMBILICAL HERNIA REPAIR         FAMILY HISTORY:  Family History   Problem Relation Age of Onset    Breast cancer Mother     Arthritis Family     Hypertension Family        SOCIAL HISTORY:  Social History     Social History    Marital status: /Civil Union     Spouse name: N/A    Number of children: 2    Years of education: N/A     Occupational History    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[employer]     Social History Main Topics    Smoking status: Never Smoker    Smokeless tobacco: Never Used    Alcohol use No    Drug use: No    Sexual activity: Not on file     Other Topics Concern    Not on file     Social History Narrative    No narrative on file       Review of Systems   Respiratory: Negative for shortness of breath  Cardiovascular: Negative for chest pain  Gastrointestinal: Negative for abdominal pain           Objective:  Vitals:    04/09/18 1125   BP: 130/72   BP Location: Left arm   Patient Position: Sitting   Cuff Size: Adult   Pulse: 60   Resp: 18   SpO2: 98%   Weight: 84 6 kg (186 lb 6 4 oz)   Height: 5' 6" (1 676 m)        Physical Exam   Constitutional: She is oriented to person, place, and time  She appears well-developed and well-nourished  Cardiovascular: Normal rate, regular rhythm and normal heart sounds  Pulmonary/Chest: Effort normal and breath sounds normal    Abdominal: Soft  There is no tenderness  Neurological: She is alert and oriented to person, place, and time  Nursing note and vitals reviewed  RESULTS:    No results found for this or any previous visit (from the past 1008 hour(s))

## 2018-05-03 ENCOUNTER — TELEPHONE (OUTPATIENT)
Dept: INTERNAL MEDICINE CLINIC | Facility: CLINIC | Age: 73
End: 2018-05-03

## 2018-05-03 DIAGNOSIS — Z12.39 SCREENING FOR MALIGNANT NEOPLASM OF BREAST: Primary | ICD-10-CM

## 2018-05-03 NOTE — TELEPHONE ENCOUNTER
Patient needs a script for a mammo  She said she is having a little pain in breasts  I am not sure if it would be a screening or a diagnostic  If you could print her a script and fax [phone]

## 2018-05-07 ENCOUNTER — TELEPHONE (OUTPATIENT)
Dept: INTERNAL MEDICINE CLINIC | Facility: CLINIC | Age: 73
End: 2018-05-07

## 2018-05-07 DIAGNOSIS — R92.8 ABNORMAL MAMMOGRAM: Primary | ICD-10-CM

## 2018-05-07 NOTE — TELEPHONE ENCOUNTER
Kareen Armenta from [facility] called stating she needs a new script for bilateral diagnostic mammo  It also needs to say its ok if there are indications for additional testing    Please print script  Fax number is [phone]

## 2018-05-09 ENCOUNTER — TELEPHONE (OUTPATIENT)
Dept: INTERNAL MEDICINE CLINIC | Facility: CLINIC | Age: 73
End: 2018-05-09

## 2018-05-09 DIAGNOSIS — N64.4 BREAST PAIN, RIGHT: Primary | ICD-10-CM

## 2018-05-09 NOTE — TELEPHONE ENCOUNTER
Diagnosis was changed to right breast pain, and new request for diagnostic bilateral mammogram is in the EMR

## 2018-05-09 NOTE — TELEPHONE ENCOUNTER
Rohini Hyde from [facility] calling, a new mammogram script was faxed over the other day but Rohini Hyde said it needed a diagnosis for pain on it  She said the diagnosis used was for abnormal mammo  She said patient had a bilateral diagnostic mammo for right breast pain   She would like a new script faxed to [phone]

## 2018-06-07 DIAGNOSIS — I10 HYPERTENSION, BENIGN: ICD-10-CM

## 2018-06-07 RX ORDER — QUINAPRIL HCL AND HYDROCHLOROTHIAZIDE 10; 12.5 MG/1; MG/1
TABLET ORAL
Qty: 30 TABLET | Refills: 5 | Status: SHIPPED | OUTPATIENT
Start: 2018-06-07 | End: 2019-01-10

## 2018-06-14 ENCOUNTER — OFFICE VISIT (OUTPATIENT)
Dept: INTERNAL MEDICINE CLINIC | Facility: CLINIC | Age: 73
End: 2018-06-14
Payer: MEDICARE

## 2018-06-14 ENCOUNTER — HOSPITAL ENCOUNTER (OUTPATIENT)
Dept: RADIOLOGY | Facility: HOSPITAL | Age: 73
Discharge: HOME | End: 2018-06-14
Payer: MEDICARE

## 2018-06-14 VITALS
HEART RATE: 62 BPM | OXYGEN SATURATION: 98 % | BODY MASS INDEX: 29.96 KG/M2 | DIASTOLIC BLOOD PRESSURE: 84 MMHG | TEMPERATURE: 97.6 F | RESPIRATION RATE: 20 BRPM | SYSTOLIC BLOOD PRESSURE: 122 MMHG | HEIGHT: 66 IN | WEIGHT: 186.4 LBS

## 2018-06-14 DIAGNOSIS — M54.5 CHRONIC BILATERAL LOW BACK PAIN, WITH SCIATICA PRESENCE UNSPECIFIED: ICD-10-CM

## 2018-06-14 DIAGNOSIS — R20.9 BILATERAL COLD FEET: ICD-10-CM

## 2018-06-14 DIAGNOSIS — R20.2 PARESTHESIA OF BOTH FEET: Primary | ICD-10-CM

## 2018-06-14 DIAGNOSIS — G89.29 CHRONIC BILATERAL LOW BACK PAIN, WITH SCIATICA PRESENCE UNSPECIFIED: ICD-10-CM

## 2018-06-14 DIAGNOSIS — R20.2 PARESTHESIA OF BOTH FEET: ICD-10-CM

## 2018-06-14 PROBLEM — M54.50 CHRONIC BILATERAL LOW BACK PAIN: Status: ACTIVE | Noted: 2018-06-14

## 2018-06-14 PROCEDURE — 99213 OFFICE O/P EST LOW 20 MIN: CPT | Performed by: PHYSICIAN ASSISTANT

## 2018-06-14 PROCEDURE — 72110 X-RAY EXAM L-2 SPINE 4/>VWS: CPT

## 2018-06-14 RX ORDER — GABAPENTIN 100 MG/1
100 CAPSULE ORAL
Qty: 30 CAPSULE | Refills: 1 | Status: SHIPPED | OUTPATIENT
Start: 2018-06-14 | End: 2018-08-17 | Stop reason: SDUPTHER

## 2018-06-14 NOTE — PROGRESS NOTES
Assessment/Plan:   Patient Instructions   Will start by checking a x-ray of patient's low back  Will give a trial of gabapentin 100 mg at bedtime  Schedule follow-up next week or the following week to review  Return in about 2 weeks (around 6/28/2018) for Recheck  Diagnoses and all orders for this visit:    Paresthesia of both feet  -     XR spine lumbar minimum 4 views non injury; Future  -     gabapentin (NEURONTIN) 100 mg capsule; Take 1 capsule (100 mg total) by mouth daily at bedtime    Bilateral cold feet  -     XR spine lumbar minimum 4 views non injury; Future    Chronic bilateral low back pain, with sciatica presence unspecified  -     XR spine lumbar minimum 4 views non injury; Future          Subjective:      Patient ID: Alfredito Henson is a 67 y o  female  Patient presents with complaint of aching in her lower legs and coldness of her toes x2 weeks  She states during the day when up walking about she does not have symptoms  It is only when she lies down at night she has the symptoms  She denies typical claudication symptoms  No chest pain, no palpitations, no shortness of breath, no lightheadedness or diaphoresis  She is a diabetic but has not had her sugars severely out of control for a long period of time  She has never been a smoker  She does admit to low back pain that does not seem to radiate  Denies bowel or bladder dysfunction          ALLERGIES:  Allergies   Allergen Reactions    Codeine Other (See Comments)     Dizzy, lightheaded    Nuts        CURRENT MEDICATIONS:    Current Outpatient Prescriptions:     gabapentin (NEURONTIN) 100 mg capsule, Take 1 capsule (100 mg total) by mouth daily at bedtime, Disp: 30 capsule, Rfl: 1    hyoscyamine (LEVSIN) 0 125 MG tablet, Take 1 tablet by mouth, Disp: , Rfl:     levocetirizine (XYZAL) 5 MG tablet, Take 5 mg by mouth every evening, Disp: , Rfl:     lovastatin (MEVACOR) 10 MG tablet, Take 1 tablet (10 mg total) by mouth daily at bedtime for 180 days, Disp: 90 tablet, Rfl: 1    metFORMIN (GLUCOPHAGE) 1000 MG tablet, TAKE 1 TABLET TWICE DAILY, Disp: 180 tablet, Rfl: 3    pantoprazole (PROTONIX) 40 mg tablet, Take 40 mg by mouth daily, Disp: , Rfl:     quinapril-hydrochlorothiazide (ACCURETIC) 10-12 5 MG per tablet, TAKE 1 TABLET DAILY, Disp: 30 tablet, Rfl: 5    ACTIVE PROBLEM LIST:  Patient Active Problem List   Diagnosis    Allergic rhinitis    Hyperlipidemia    Hypertension, benign    Irritable bowel syndrome without diarrhea    Type 2 diabetes mellitus without complication, without long-term current use of insulin ([facility])    Screening for malignant neoplasm of breast    Abnormal mammogram    Breast pain, right    Paresthesia of both feet    Bilateral cold feet    Chronic bilateral low back pain       PAST MEDICAL HISTORY:  Past Medical History:   Diagnosis Date    Arthritis     Diabetes mellitus ([address] )     HNP (herniated nucleus pulposus), lumbar     Hyperlipidemia     Hypertension     IBS (irritable bowel syndrome)     Osteoarthritis        PAST SURGICAL HISTORY:  Past Surgical History:   Procedure Laterality Date    PARATHYROID GLAND SURGERY      resolved 05/12    [place] ESOPHAGOGASTRODUODENOSCOPY TRANSORAL DIAGNOSTIC N/A 4/19/2017    Procedure: EGD AND COLONOSCOPY;  Surgeon: Frank Carballo MD;  Location: [facility];   Service: Gastroenterology    UMBILICAL HERNIA REPAIR         FAMILY HISTORY:  Family History   Problem Relation Age of Onset    Breast cancer Mother     Arthritis Family     Hypertension Family        SOCIAL HISTORY:  Social History     Social History    Marital status: /Civil Union     Spouse name: N/A    Number of children: 2    Years of education: N/A     Occupational History    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     Social History Main Topics    Smoking status: Never Smoker    Smokeless tobacco: Never Used    Alcohol use No    Drug use: No    Sexual activity: Not on file     Other Topics Concern    Not on file     Social History Narrative    No narrative on file       Review of Systems   Constitutional: Negative for activity change, chills, fatigue and fever  HENT: Negative for congestion  Eyes: Negative for discharge  Respiratory: Negative for cough, chest tightness and shortness of breath  Cardiovascular: Negative for chest pain, palpitations and leg swelling  Gastrointestinal: Negative for abdominal pain  Genitourinary: Negative for difficulty urinating  Musculoskeletal: Positive for back pain  Negative for arthralgias and myalgias  Skin: Negative for rash  Allergic/Immunologic: Negative for immunocompromised state  Neurological: Negative for dizziness, syncope, weakness, light-headedness and headaches  Hematological: Negative for adenopathy  Does not bruise/bleed easily  Psychiatric/Behavioral: Negative for dysphoric mood  The patient is not nervous/anxious  Objective:  Vitals:    06/14/18 1011   BP: 122/84   BP Location: Left arm   Patient Position: Sitting   Cuff Size: Adult   Pulse: 62   Resp: 20   Temp: 97 6 °F (36 4 °C)   TempSrc: Temporal   SpO2: 98%   Weight: 84 6 kg (186 lb 6 4 oz)   Height: 5' 6" (1 676 m)        Physical Exam   Constitutional: She is oriented to person, place, and time  She appears well-developed and well-nourished  No distress  Neck: Neck supple  Carotid bruit is not present  No thyromegaly present  Cardiovascular: Normal rate, regular rhythm and normal heart sounds  Occasional extrasystole was audible   Pulmonary/Chest: Effort normal and breath sounds normal    Abdominal: Soft  Bowel sounds are normal  There is no tenderness  No abdominal bruits   Musculoskeletal: She exhibits no edema  Could not appreciate any femoral bruits  Popliteal pulses easy to palpate  Dorsalis pedis pulses 2+, posterior tibialis pulses 1+  Toes do feel cool to touch  No color changes    Varicosities present of the lower legs   Lymphadenopathy:     She has no cervical adenopathy  Neurological: She is alert and oriented to person, place, and time  Skin: Skin is warm and dry  No rash noted  Psychiatric: She has a normal mood and affect  Her behavior is normal    Nursing note and vitals reviewed  RESULTS:    No results found for this or any previous visit (from the past 1008 hour(s))  This note was created with voice recognition software  Phonic, grammatical and spelling errors may be present within the note as a result

## 2018-06-14 NOTE — PATIENT INSTRUCTIONS
Will start by checking a x-ray of patient's low back  Will give a trial of gabapentin 100 mg at bedtime  Schedule follow-up next week or the following week to review

## 2018-06-27 DIAGNOSIS — R10.84 GENERALIZED ABDOMINAL PAIN: Primary | ICD-10-CM

## 2018-06-27 RX ORDER — HYOSCYAMINE SULFATE 0.125 MG
TABLET ORAL
Qty: 60 TABLET | Refills: 2 | Status: SHIPPED | OUTPATIENT
Start: 2018-06-27 | End: 2019-11-19 | Stop reason: SDUPTHER

## 2018-06-28 ENCOUNTER — OFFICE VISIT (OUTPATIENT)
Dept: INTERNAL MEDICINE CLINIC | Facility: CLINIC | Age: 73
End: 2018-06-28
Payer: MEDICARE

## 2018-06-28 VITALS
WEIGHT: 186 LBS | BODY MASS INDEX: 29.89 KG/M2 | HEIGHT: 66 IN | TEMPERATURE: 97.7 F | DIASTOLIC BLOOD PRESSURE: 76 MMHG | OXYGEN SATURATION: 99 % | SYSTOLIC BLOOD PRESSURE: 128 MMHG | RESPIRATION RATE: 18 BRPM | HEART RATE: 58 BPM

## 2018-06-28 DIAGNOSIS — M54.5 CHRONIC BILATERAL LOW BACK PAIN, WITH SCIATICA PRESENCE UNSPECIFIED: ICD-10-CM

## 2018-06-28 DIAGNOSIS — E78.2 MIXED HYPERLIPIDEMIA: ICD-10-CM

## 2018-06-28 DIAGNOSIS — R20.2 PARESTHESIA OF BOTH FEET: ICD-10-CM

## 2018-06-28 DIAGNOSIS — E11.9 TYPE 2 DIABETES MELLITUS WITHOUT COMPLICATION, WITHOUT LONG-TERM CURRENT USE OF INSULIN (HCC): Primary | ICD-10-CM

## 2018-06-28 DIAGNOSIS — G89.29 CHRONIC BILATERAL LOW BACK PAIN, WITH SCIATICA PRESENCE UNSPECIFIED: ICD-10-CM

## 2018-06-28 PROCEDURE — 99214 OFFICE O/P EST MOD 30 MIN: CPT | Performed by: PHYSICIAN ASSISTANT

## 2018-06-28 NOTE — PATIENT INSTRUCTIONS
Advised continuation of the gabapentin for 1 month  Then try discontinuing and see if tingling symptoms return  Meantime schedule follow-up in October with repeat labs  No change in current medications  Recommend you do back stretches as discussed in demonstrated on a daily basis

## 2018-06-28 NOTE — PROGRESS NOTES
Assessment/Plan:   Patient Instructions   Advised continuation of the gabapentin for 1 month  Then try discontinuing and see if tingling symptoms return  Meantime schedule follow-up in October with repeat labs  No change in current medications  Recommend you do back stretches as discussed in demonstrated on a daily basis  Return in about 3 months (around 9/28/2018) for Next scheduled follow up-Pt preference  Diagnoses and all orders for this visit:    Type 2 diabetes mellitus without complication, without long-term current use of insulin (HCC)  -     Comprehensive metabolic panel; Future  -     Hemoglobin A1C; Future    Mixed hyperlipidemia  -     Lipid panel; Future    Paresthesia of both feet    Chronic bilateral low back pain, with sciatica presence unspecified          Subjective:      Patient ID: Seth Obando is a 67 y o  female  Follow-up    Patient is reporting that the paresthesias of her feet, the tingling of her lower legs and hands has seemed to improve with using the gabapentin 100 mg at bedtime  Review of x-ray of her LS spine showed changes of arthritis, no other significant findings  We discussed exercising, she did admit she had been in physical therapy in her past for her low back but never continue doing the exercising and stretching on a daily basis  I encouraged her to start doing so for good back health  Type 2 diabetes  Review of last labs did show that her A1c had gone up to 7 1  This was surprising to her  She did recall prior to those labs she had been off her diet  We reviewed her medications, I encouraged the exercising and no concentrated sweets with reduction carbohydrate portions  Hyperlipidemia:  She is on a statin  No complaints of generalized muscle achiness  Last lipids were acceptable  IBS:  Patient states this has been acting up recently    We did discuss this condition, I asked her to pay attention to her stress level when she starts with symptoms          ALLERGIES:  Allergies   Allergen Reactions    Codeine Other (See Comments)     Dizzy, lightheaded    Nuts        CURRENT MEDICATIONS:    Current Outpatient Prescriptions:     gabapentin (NEURONTIN) 100 mg capsule, Take 1 capsule (100 mg total) by mouth daily at bedtime, Disp: 30 capsule, Rfl: 1    hyoscyamine (ANASPAZ,LEVSIN) 0 125 MG tablet, TAKE 1 TABLET 3-4 TIMES DAILY AS NEEDED , Disp: 60 tablet, Rfl: 2    levocetirizine (XYZAL) 5 MG tablet, Take 5 mg by mouth every evening, Disp: , Rfl:     lovastatin (MEVACOR) 10 MG tablet, Take 1 tablet (10 mg total) by mouth daily at bedtime for 180 days, Disp: 90 tablet, Rfl: 1    metFORMIN (GLUCOPHAGE) 1000 MG tablet, TAKE 1 TABLET TWICE DAILY, Disp: 180 tablet, Rfl: 3    pantoprazole (PROTONIX) 40 mg tablet, Take 40 mg by mouth daily, Disp: , Rfl:     quinapril-hydrochlorothiazide (ACCURETIC) 10-12 5 MG per tablet, TAKE 1 TABLET DAILY, Disp: 30 tablet, Rfl: 5    ACTIVE PROBLEM LIST:  Patient Active Problem List   Diagnosis    Allergic rhinitis    Hyperlipidemia    Hypertension, benign    Irritable bowel syndrome without diarrhea    Type 2 diabetes mellitus without complication, without long-term current use of insulin (HCC)    Screening for malignant neoplasm of breast    Abnormal mammogram    Breast pain, right    Paresthesia of both feet    Bilateral cold feet    Chronic bilateral low back pain       PAST MEDICAL HISTORY:  Past Medical History:   Diagnosis Date    Arthritis     Diabetes mellitus [facility] [address] [facility]     HNP (herniated nucleus pulposus), lumbar     Hyperlipidemia     Hypertension     IBS (irritable bowel syndrome)     Osteoarthritis        PAST SURGICAL HISTORY:  Past Surgical History:   Procedure Laterality Date    PARATHYROID GLAND SURGERY      resolved 05/12    [place] ESOPHAGOGASTRODUODENOSCOPY TRANSORAL DIAGNOSTIC N/A 4/19/2017    Procedure: EGD AND COLONOSCOPY;  Surgeon: Frank Carballo MD;  Location: [facility]; Service: Gastroenterology    UMBILICAL HERNIA REPAIR         FAMILY HISTORY:  Family History   Problem Relation Age of Onset    Breast cancer Mother     Arthritis Family     Hypertension Family        SOCIAL HISTORY:  Social History     Social History    Marital status: /Civil Union     Spouse name: N/A    Number of children: 2    Years of education: N/A     Occupational History    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     Social History Main Topics    Smoking status: Never Smoker    Smokeless tobacco: Never Used    Alcohol use No    Drug use: No    Sexual activity: Not on file     Other Topics Concern    Not on file     Social History Narrative    No narrative on file       Review of Systems   Constitutional: Negative for activity change, chills, fatigue and fever  HENT: Negative for congestion  Eyes: Negative for discharge  Respiratory: Negative for cough, chest tightness and shortness of breath  Cardiovascular: Negative for chest pain, palpitations and leg swelling  Gastrointestinal: Positive for abdominal pain and constipation  Negative for nausea and vomiting  Genitourinary: Negative for difficulty urinating  Musculoskeletal: Positive for arthralgias and back pain  Negative for myalgias  Skin: Negative for rash  Allergic/Immunologic: Negative for immunocompromised state  Neurological: Negative for dizziness, syncope, weakness, light-headedness and headaches  Hematological: Negative for adenopathy  Does not bruise/bleed easily  Psychiatric/Behavioral: Negative for dysphoric mood  The patient is not nervous/anxious  Objective:  Vitals:    06/28/18 1038   BP: 128/76   BP Location: Right arm   Patient Position: Sitting   Pulse: 58   Resp: 18   Temp: 97 7 °F (36 5 °C)   SpO2: 99%   Weight: 84 4 kg (186 lb)   Height: 5' 6" (1 676 m)        Physical Exam   Constitutional: She is oriented to person, place, and time   She appears well-developed and well-nourished  Cardiovascular: Normal rate, regular rhythm and normal heart sounds  Pulmonary/Chest: Effort normal and breath sounds normal    Musculoskeletal: She exhibits no edema  Paralumbar and sacral tenderness  Limitation motion to flexion and extension with complaint of achiness  Strength, reflexes, pulses are all intact   Neurological: She is alert and oriented to person, place, and time  Skin: Skin is warm and dry  No rash noted  Psychiatric: She has a normal mood and affect  Her behavior is normal    Nursing note and vitals reviewed  RESULTS:    No results found for this or any previous visit (from the past 1008 hour(s))  This note was created with voice recognition software  Phonic, grammatical and spelling errors may be present within the note as a result

## 2018-07-30 DIAGNOSIS — E78.2 MIXED HYPERLIPIDEMIA: ICD-10-CM

## 2018-07-30 RX ORDER — LOVASTATIN 10 MG/1
TABLET ORAL
Qty: 90 TABLET | Refills: 1 | Status: SHIPPED | OUTPATIENT
Start: 2018-07-30 | End: 2018-11-28

## 2018-08-17 DIAGNOSIS — R20.2 PARESTHESIA OF BOTH FEET: ICD-10-CM

## 2018-08-17 RX ORDER — GABAPENTIN 100 MG/1
CAPSULE ORAL
Qty: 30 CAPSULE | Refills: 1 | Status: SHIPPED | OUTPATIENT
Start: 2018-08-17 | End: 2018-10-05

## 2018-08-17 NOTE — TELEPHONE ENCOUNTER
Please check to see whether not patient is still taking this medication  My last note indicated she may have been stopping it

## 2018-08-17 NOTE — TELEPHONE ENCOUNTER
Spoke to patient  She is taking the medication  Would you like her to stop it?  If not please send to [facility] [address]

## 2018-09-26 ENCOUNTER — TRANSCRIBE ORDERS (OUTPATIENT)
Dept: LAB | Facility: CLINIC | Age: 73
End: 2018-09-26

## 2018-09-26 ENCOUNTER — APPOINTMENT (OUTPATIENT)
Dept: LAB | Facility: CLINIC | Age: 73
End: 2018-09-26
Payer: MEDICARE

## 2018-09-26 DIAGNOSIS — E78.2 MIXED HYPERLIPIDEMIA: ICD-10-CM

## 2018-09-26 DIAGNOSIS — E11.9 TYPE 2 DIABETES MELLITUS WITHOUT COMPLICATION, WITHOUT LONG-TERM CURRENT USE OF INSULIN (HCC): ICD-10-CM

## 2018-09-26 LAB
ALBUMIN SERPL BCP-MCNC: 3.2 G/DL (ref 3.5–5)
ALP SERPL-CCNC: 102 U/L (ref 46–116)
ALT SERPL W P-5'-P-CCNC: 23 U/L (ref 12–78)
ANION GAP SERPL CALCULATED.3IONS-SCNC: 3 MMOL/L (ref 4–13)
AST SERPL W P-5'-P-CCNC: 15 U/L (ref 5–45)
BILIRUB SERPL-MCNC: 0.5 MG/DL (ref 0.2–1)
BUN SERPL-MCNC: 19 MG/DL (ref 5–25)
CALCIUM SERPL-MCNC: 9 MG/DL (ref 8.3–10.1)
CHLORIDE SERPL-SCNC: 105 MMOL/L (ref 100–108)
CHOLEST SERPL-MCNC: 171 MG/DL (ref 50–200)
CO2 SERPL-SCNC: 30 MMOL/L (ref 21–32)
CREAT SERPL-MCNC: 1.15 MG/DL (ref 0.6–1.3)
EST. AVERAGE GLUCOSE BLD GHB EST-MCNC: 148 MG/DL
GFR SERPL CREATININE-BSD FRML MDRD: 55 ML/MIN/1.73SQ M
GLUCOSE P FAST SERPL-MCNC: 128 MG/DL (ref 65–99)
HBA1C MFR BLD: 6.8 % (ref 4.2–6.3)
HDLC SERPL-MCNC: 52 MG/DL (ref 40–60)
LDLC SERPL CALC-MCNC: 99 MG/DL (ref 0–100)
NONHDLC SERPL-MCNC: 119 MG/DL
POTASSIUM SERPL-SCNC: 4.3 MMOL/L (ref 3.5–5.3)
PROT SERPL-MCNC: 7.8 G/DL (ref 6.4–8.2)
SODIUM SERPL-SCNC: 138 MMOL/L (ref 136–145)
TRIGL SERPL-MCNC: 101 MG/DL

## 2018-09-26 PROCEDURE — 80053 COMPREHEN METABOLIC PANEL: CPT

## 2018-09-26 PROCEDURE — 80061 LIPID PANEL: CPT

## 2018-09-26 PROCEDURE — 36415 COLL VENOUS BLD VENIPUNCTURE: CPT

## 2018-09-26 PROCEDURE — 83036 HEMOGLOBIN GLYCOSYLATED A1C: CPT

## 2018-10-05 ENCOUNTER — OFFICE VISIT (OUTPATIENT)
Dept: INTERNAL MEDICINE CLINIC | Facility: CLINIC | Age: 73
End: 2018-10-05
Payer: MEDICARE

## 2018-10-05 ENCOUNTER — APPOINTMENT (OUTPATIENT)
Dept: LAB | Facility: CLINIC | Age: 73
End: 2018-10-05
Payer: MEDICARE

## 2018-10-05 ENCOUNTER — TRANSCRIBE ORDERS (OUTPATIENT)
Dept: LAB | Facility: CLINIC | Age: 73
End: 2018-10-05

## 2018-10-05 VITALS
DIASTOLIC BLOOD PRESSURE: 82 MMHG | HEART RATE: 86 BPM | BODY MASS INDEX: 30.41 KG/M2 | SYSTOLIC BLOOD PRESSURE: 126 MMHG | TEMPERATURE: 97.4 F | WEIGHT: 189.2 LBS | OXYGEN SATURATION: 97 % | HEIGHT: 66 IN | RESPIRATION RATE: 17 BRPM

## 2018-10-05 DIAGNOSIS — Z23 NEED FOR INFLUENZA VACCINATION: Primary | ICD-10-CM

## 2018-10-05 DIAGNOSIS — R30.0 DYSURIA: ICD-10-CM

## 2018-10-05 DIAGNOSIS — E78.2 MIXED HYPERLIPIDEMIA: ICD-10-CM

## 2018-10-05 DIAGNOSIS — I10 HYPERTENSION, BENIGN: ICD-10-CM

## 2018-10-05 DIAGNOSIS — E11.9 TYPE 2 DIABETES MELLITUS WITHOUT COMPLICATION, WITHOUT LONG-TERM CURRENT USE OF INSULIN (HCC): ICD-10-CM

## 2018-10-05 LAB
BACTERIA UR QL AUTO: ABNORMAL /HPF
BILIRUB UR QL STRIP: NEGATIVE
CLARITY UR: CLEAR
COLOR UR: YELLOW
GLUCOSE UR STRIP-MCNC: NEGATIVE MG/DL
HGB UR QL STRIP.AUTO: NEGATIVE
HYALINE CASTS #/AREA URNS LPF: ABNORMAL /LPF
KETONES UR STRIP-MCNC: NEGATIVE MG/DL
LEUKOCYTE ESTERASE UR QL STRIP: ABNORMAL
NITRITE UR QL STRIP: NEGATIVE
NON-SQ EPI CELLS URNS QL MICRO: ABNORMAL /HPF
PH UR STRIP.AUTO: 6 [PH] (ref 4.5–8)
PROT UR STRIP-MCNC: NEGATIVE MG/DL
RBC #/AREA URNS AUTO: ABNORMAL /HPF
SP GR UR STRIP.AUTO: 1.02 (ref 1–1.03)
UROBILINOGEN UR QL STRIP.AUTO: 0.2 E.U./DL
WBC #/AREA URNS AUTO: ABNORMAL /HPF

## 2018-10-05 PROCEDURE — G0008 ADMIN INFLUENZA VIRUS VAC: HCPCS | Performed by: INTERNAL MEDICINE

## 2018-10-05 PROCEDURE — 99214 OFFICE O/P EST MOD 30 MIN: CPT | Performed by: INTERNAL MEDICINE

## 2018-10-05 PROCEDURE — 90662 IIV NO PRSV INCREASED AG IM: CPT | Performed by: INTERNAL MEDICINE

## 2018-10-05 PROCEDURE — 87147 CULTURE TYPE IMMUNOLOGIC: CPT

## 2018-10-05 PROCEDURE — 81001 URINALYSIS AUTO W/SCOPE: CPT

## 2018-10-05 PROCEDURE — 87086 URINE CULTURE/COLONY COUNT: CPT

## 2018-10-05 NOTE — PROGRESS NOTES
Assessment/Plan:      Chronic problems are stable  Continue present medications  Continue diet and exercise  Ordered labs for next visit  For her urine symptoms, if the pattern changes, she will get the urine testing done  Reviewed bladder training strategies with her  Return in about 4 months (around 2/5/2019)  No problem-specific Assessment & Plan notes found for this encounter  Diagnoses and all orders for this visit:    Need for influenza vaccination  -     influenza vaccine, 0864-1506, high-dose, PF 0 5 mL, for patients 65 yr+ (FLUZONE HIGH-DOSE)    Type 2 diabetes mellitus without complication, without long-term current use of insulin (HCC)  -     CBC and differential; Future  -     Comprehensive metabolic panel; Future  -     Lipid panel; Future  -     Hemoglobin A1C; Future    Hypertension, benign    Mixed hyperlipidemia    Dysuria  -     Urinalysis with microscopic; Future  -     Urine culture; Future          Subjective:      Patient ID: Anupama Barry is a 67 y o  female  Patient comes in today for routine follow-up with her   Her blood work is overall improved  Her sugars are well controlled  Her cholesterol is controlled  Her blood pressure is controlled  She is taking her medicines as directed  She does note that she urinates frequently  Multiple times a day and a few times at night  No urgency but her back does sometimes bother her  No incontinence    Tries to limit her liquids at night        ALLERGIES:  Allergies   Allergen Reactions    Codeine Other (See Comments)     Dizzy, lightheaded    Nuts        CURRENT MEDICATIONS:    Current Outpatient Prescriptions:     hyoscyamine (ANASPAZ,LEVSIN) 0 125 MG tablet, TAKE 1 TABLET 3-4 TIMES DAILY AS NEEDED , Disp: 60 tablet, Rfl: 2    levocetirizine (XYZAL) 5 MG tablet, Take 5 mg by mouth every evening, Disp: , Rfl:     lovastatin (MEVACOR) 10 MG tablet, TAKE 1 TABLET AT BEDTIME, Disp: 90 tablet, Rfl: 1    metFORMIN (GLUCOPHAGE) 1000 MG tablet, TAKE 1 TABLET TWICE DAILY, Disp: 180 tablet, Rfl: 3    pantoprazole (PROTONIX) 40 mg tablet, Take 40 mg by mouth daily, Disp: , Rfl:     quinapril-hydrochlorothiazide (ACCURETIC) 10-12 5 MG per tablet, TAKE 1 TABLET DAILY, Disp: 30 tablet, Rfl: 5    ACTIVE PROBLEM LIST:  Patient Active Problem List   Diagnosis    Allergic rhinitis    Hyperlipidemia    Hypertension, benign    Irritable bowel syndrome without diarrhea    Type 2 diabetes mellitus without complication, without long-term current use of insulin ([facility])    Screening for malignant neoplasm of breast    Abnormal mammogram    Breast pain, right    Paresthesia of both feet    Bilateral cold feet    Chronic bilateral low back pain       PAST MEDICAL HISTORY:  Past Medical History:   Diagnosis Date    Arthritis     Diabetes mellitus ([address] )     HNP (herniated nucleus pulposus), lumbar     Hyperlipidemia     Hypertension     IBS (irritable bowel syndrome)     Osteoarthritis        PAST SURGICAL HISTORY:  Past Surgical History:   Procedure Laterality Date    PARATHYROID GLAND SURGERY      resolved 05/12    [place] ESOPHAGOGASTRODUODENOSCOPY TRANSORAL DIAGNOSTIC N/A 4/19/2017    Procedure: EGD AND COLONOSCOPY;  Surgeon: Mary Christine MD;  Location: [facility];   Service: Gastroenterology    UMBILICAL HERNIA REPAIR         FAMILY HISTORY:  Family History   Problem Relation Age of Onset    Breast cancer Mother     Arthritis Family     Hypertension Family        SOCIAL HISTORY:  Social History     Social History    Marital status: /Civil Union     Spouse name: N/A    Number of children: 2    Years of education: N/A     Occupational History    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     Social History Main Topics    Smoking status: Never Smoker    Smokeless tobacco: Never Used    Alcohol use No    Drug use: No    Sexual activity: Yes     Partners: Male     Other Topics Concern    Not on file Social History Narrative    No narrative on file       Review of Systems   Respiratory: Negative for shortness of breath  Cardiovascular: Negative for chest pain  Gastrointestinal: Negative for abdominal pain  Objective:  Vitals:    10/05/18 1012   BP: 126/82   BP Location: Left arm   Patient Position: Sitting   Cuff Size: Adult   Pulse: 86   Resp: 17   Temp: (!) 97 4 °F (36 3 °C)   TempSrc: Temporal   SpO2: 97%   Weight: 85 8 kg (189 lb 3 2 oz)   Height: 5' 6" (1 676 m)        Physical Exam   Constitutional: She is oriented to person, place, and time  She appears well-developed and well-nourished  Cardiovascular: Normal rate, regular rhythm and normal heart sounds  Pulmonary/Chest: Effort normal and breath sounds normal    Abdominal: Soft  There is no tenderness  Neurological: She is alert and oriented to person, place, and time  Nursing note and vitals reviewed          RESULTS:    Recent Results (from the past 1008 hour(s))   Comprehensive metabolic panel    Collection Time: 09/26/18  9:09 AM   Result Value Ref Range    Sodium 138 136 - 145 mmol/L    Potassium 4 3 3 5 - 5 3 mmol/L    Chloride 105 100 - 108 mmol/L    CO2 30 21 - 32 mmol/L    ANION GAP 3 (L) 4 - 13 mmol/L    BUN 19 5 - 25 mg/dL    Creatinine 1 15 0 60 - 1 30 mg/dL    Glucose, Fasting 128 (H) 65 - 99 mg/dL    Calcium 9 0 8 3 - 10 1 mg/dL    AST 15 5 - 45 U/L    ALT 23 12 - 78 U/L    Alkaline Phosphatase 102 46 - 116 U/L    Total Protein 7 8 6 4 - 8 2 g/dL    Albumin 3 2 (L) 3 5 - 5 0 g/dL    Total Bilirubin 0 50 0 20 - 1 00 mg/dL    eGFR 55 ml/min/1 73sq m   Lipid panel    Collection Time: 09/26/18  9:09 AM   Result Value Ref Range    Cholesterol 171 50 - 200 mg/dL    Triglycerides 101 <=150 mg/dL    HDL, Direct 52 40 - 60 mg/dL    LDL Calculated 99 0 - 100 mg/dL    Non-HDL-Chol (CHOL-HDL) 119 mg/dl   Hemoglobin A1C    Collection Time: 09/26/18  9:09 AM   Result Value Ref Range    Hemoglobin A1C 6 8 (H) 4 2 - 6 3 %    EAG 148 mg/dl       This note was created with voice recognition software  Phonic, grammatical and spelling errors may be present within the note as a result

## 2018-10-07 LAB
BACTERIA UR CULT: ABNORMAL
BACTERIA UR CULT: ABNORMAL

## 2018-10-08 DIAGNOSIS — N39.0 URINARY TRACT INFECTION WITHOUT HEMATURIA, SITE UNSPECIFIED: Primary | ICD-10-CM

## 2018-10-08 RX ORDER — AMOXICILLIN 500 MG/1
500 CAPSULE ORAL 3 TIMES DAILY
Qty: 15 CAPSULE | Refills: 0 | Status: SHIPPED | OUTPATIENT
Start: 2018-10-08 | End: 2018-10-13

## 2018-10-25 DIAGNOSIS — R20.2 PARESTHESIA OF BOTH FEET: ICD-10-CM

## 2018-10-25 RX ORDER — GABAPENTIN 100 MG/1
CAPSULE ORAL
Qty: 30 CAPSULE | Refills: 1 | OUTPATIENT
Start: 2018-10-25

## 2018-10-29 ENCOUNTER — HOSPITAL ENCOUNTER (EMERGENCY)
Facility: HOSPITAL | Age: 73
Discharge: HOME | End: 2018-10-29
Attending: EMERGENCY MEDICINE
Payer: MEDICARE

## 2018-10-29 ENCOUNTER — APPOINTMENT (EMERGENCY)
Dept: RADIOLOGY | Facility: HOSPITAL | Age: 73
End: 2018-10-29
Payer: MEDICARE

## 2018-10-29 VITALS
OXYGEN SATURATION: 97 % | RESPIRATION RATE: 18 BRPM | WEIGHT: 187 LBS | SYSTOLIC BLOOD PRESSURE: 151 MMHG | HEART RATE: 65 BPM | TEMPERATURE: 97.9 F | HEIGHT: 66 IN | DIASTOLIC BLOOD PRESSURE: 94 MMHG | BODY MASS INDEX: 30.05 KG/M2

## 2018-10-29 DIAGNOSIS — M25.562 KNEE PAIN, BILATERAL: Primary | ICD-10-CM

## 2018-10-29 DIAGNOSIS — M25.561 KNEE PAIN, BILATERAL: Primary | ICD-10-CM

## 2018-10-29 DIAGNOSIS — M19.90 OSTEOARTHRITIS: ICD-10-CM

## 2018-10-29 PROCEDURE — 73562 X-RAY EXAM OF KNEE 3: CPT

## 2018-10-29 PROCEDURE — 73564 X-RAY EXAM KNEE 4 OR MORE: CPT

## 2018-10-29 PROCEDURE — 99283 EMERGENCY DEPT VISIT LOW MDM: CPT

## 2018-10-29 NOTE — ED PROVIDER NOTES
Pt Name: Alia Cline  MRN: [id number]  [place] 1945  Age/Sex: 67 y o  female  Date of evaluation: 10/29/2018  PCP: Martha Bradshaw MD    [address] [place] [address]    Chief Complaint   Patient presents with    Knee Pain     Patient presents with bilateral knee pain since October 5th when she fell in her driveway  Patient presents today because the pain has not gotten any better  HPI    67 y o  female presenting with bilateral knee pain  Patient states that she fell onto her right knee in mid October and that she has had pain at the knee cap since then  She also notes that a few days ago she put her left knee down on the bed and heard a pop with slight swelling and pain  She notes that she had a chronic cyst at that site for months although it feels like it got bigger during that time  She denies fevers, trauma, chest pain, shortness of breath, numbness, weakness  Patient still able to walk although causes her some pain  HPI      Past Medical and Surgical History    Past Medical History:   Diagnosis Date    Arthritis     Diabetes mellitus ([address] )     HNP (herniated nucleus pulposus), lumbar     Hyperlipidemia     Hypertension     IBS (irritable bowel syndrome)     Osteoarthritis        Past Surgical History:   Procedure Laterality Date    PARATHYROID GLAND SURGERY      resolved 05/12    AL ESOPHAGOGASTRODUODENOSCOPY TRANSORAL DIAGNOSTIC N/A 4/19/2017    Procedure: EGD AND COLONOSCOPY;  Surgeon: Manley Babinski, MD;  Location: [facility];   Service: Gastroenterology    UMBILICAL HERNIA REPAIR         Family History   Problem Relation Age of Onset    Breast cancer Mother     Arthritis Family     Hypertension Family        Social History   Substance Use Topics    Smoking status: Never Smoker    Smokeless tobacco: Never Used    Alcohol use No           Allergies    Allergies   Allergen Reactions    Codeine Other (See Comments)     Dizzy, lightheaded   [facility] Medications    Prior to Admission medications    Medication Sig Start Date End Date Taking? Authorizing Provider   hyoscyamine (ANASPAZ,LEVSIN) 0 125 MG tablet TAKE 1 TABLET 3-4 TIMES DAILY AS NEEDED  6/27/18   Jacqui Barry PA-C   levocetirizine (XYZAL) 5 MG tablet Take 5 mg by mouth every evening    Historical Provider, MD   lovastatin (MEVACOR) 10 MG tablet TAKE 1 TABLET AT BEDTIME 7/30/18   Elena Chirinos PA-C   metFORMIN (GLUCOPHAGE) 1000 MG tablet TAKE 1 TABLET TWICE DAILY 2/11/18   Elena Chirinos PA-C   pantoprazole (PROTONIX) 40 mg tablet Take 40 mg by mouth daily    Historical Provider, MD   quinapril-hydrochlorothiazide (ACCURETIC) 10-12 5 MG per tablet TAKE 1 TABLET DAILY 6/7/18   Andry Valdes MD           Review of Systems    Review of Systems   Constitutional: Negative for activity change, chills and fever  HENT: Negative for drooling and facial swelling  Eyes: Negative for pain, discharge and visual disturbance  Respiratory: Negative for apnea, cough, chest tightness, shortness of breath and wheezing  Cardiovascular: Negative for chest pain and leg swelling  Gastrointestinal: Negative for abdominal pain, constipation, diarrhea, nausea and vomiting  Genitourinary: Negative for difficulty urinating, dysuria and urgency  Musculoskeletal: Positive for arthralgias, gait problem and joint swelling  Negative for back pain  Skin: Negative for color change and rash  Neurological: Negative for dizziness, speech difficulty, weakness and headaches  Psychiatric/Behavioral: Negative for agitation, behavioral problems and confusion  All other systems reviewed and negative      Physical Exam      ED Triage Vitals   Temperature Pulse Respirations Blood Pressure SpO2   10/29/18 1320 10/29/18 1320 10/29/18 1320 10/29/18 1321 10/29/18 1320   97 9 °F (36 6 °C) 73 18 (!) 180/97 100 %      Temp Source Heart Rate Source Patient Position - Orthostatic VS BP Location FiO2 (%)   10/29/18 1320 10/29/18 1320 10/29/18 1320 10/29/18 1320 --   Oral Monitor Sitting Left arm       Pain Score       10/29/18 1320       5               Physical Exam   Constitutional: She is oriented to person, place, and time  She appears well-developed and well-nourished  HENT:   Head: Normocephalic and atraumatic  Eyes: Pupils are equal, round, and reactive to light  Conjunctivae and EOM are normal    Neck: Normal range of motion  Neck supple  Cardiovascular: Normal rate, regular rhythm, normal heart sounds and intact distal pulses  Pulmonary/Chest: Effort normal and breath sounds normal  No respiratory distress  She has no wheezes  She has no rales  Abdominal: Soft  She exhibits no distension  There is no tenderness  There is no rebound and no guarding  Musculoskeletal: Normal range of motion  She exhibits edema and tenderness  She exhibits no deformity  Right knee tender to palpation at the patella, no pain on palpation elsewhere or with varus or valgus stress, no pain with Viola, negative anterior drawer    left knee with slight edema, the 2 cm fluctuant mobile mass tender to palpation superior to the patella, , no pain on palpation elsewhere or with varus or valgus stress, no pain with Viola, negative anterior drawer   Neurological: She is alert and oriented to person, place, and time  Skin: Skin is warm and dry  No rash noted  No erythema  Psychiatric: She has a normal mood and affect   Her behavior is normal  Judgment and thought content normal             Diagnostic Results      Labs:    Results for orders placed or performed in visit on 10/05/18   Urine culture   Result Value Ref Range    Urine Culture (A)      40,000-49,000 cfu/ml Beta Hemolytic Streptococcus Group B    Urine Culture 30,000-39,000 cfu/ml     Urinalysis with microscopic   Result Value Ref Range    Clarity, UA Clear     Color, UA Yellow     Specific [place], UA 1 018 1 003 - 1 030    pH, UA 6 0 4 5 - 8 0    Glucose, UA Negative Negative mg/dl    Ketones, UA Negative Negative mg/dl    Blood, UA Negative Negative    Protein, UA Negative Negative mg/dl    Nitrite, UA Negative Negative    Bilirubin, UA Negative Negative    Urobilinogen, UA 0 2 0 2, 1 0 E U /dl E U /dl    Leukocytes, UA Small (A) Negative    WBC, UA None Seen None Seen, 0-5, 5-55, 5-65 /hpf    RBC, UA None Seen None Seen, 0-5 /hpf    Hyaline Casts, UA None Seen None Seen /lpf    Bacteria, UA None Seen None Seen, Occasional /hpf    Epithelial Cells None Seen None Seen, Occasional /hpf       All labs reviewed and utilized in the medical decision making process    Radiology:    XR knee 3 views left non injury   Final Result      Arthritis  No fracture            Workstation performed: [id number]         XR knee 4+ views Right injury   Final Result      No fracture  Swelling  Osteoarthritis            Workstation performed: [id number]             All radiology studies independently viewed by me and interpreted by the radiologist     Procedure    Procedures    CritCare Time      ED Course of Care and Re-Assessments      The evaluation reassuring in ER patient discharged strict return precautions, offered crutches but declined  Medications - No data to display        FINAL IMPRESSION    Final diagnoses:   Knee pain, bilateral   Osteoarthritis         DISPOSITION/PLAN    Vital signs examination overall reassuring although remarkable heard abnormalities above  Plain films sent returned reassuring  Patient ambulating without significant difficulty  Low suspicion for fracture dislocation at this time  Discharged strict return precautions, follow up primary care doctor and orthopedics    Time reflects when diagnosis was documented in both MDM as applicable and the Disposition within this note     Time User Action Codes Description Comment    10/29/2018  4:25 PM Hazel Clifton [Z02 [id number],  H98 [id number]] Knee pain, bilateral     10/29/2018  4:25 PM Tamara MORA Add [M19 [id number]] Osteoarthritis       ED Disposition     ED Disposition Condition Comment    Discharge  Francis Hale discharge to home/self care  Condition at discharge: Good        Follow-up Information     Follow up With Specialties Details Why Contact Info Additional [phone] [facility] [place] [facility] Call in 1 day To discuss your symptoms and further care  [address] [facility] [address] [phone] [address] [facility] [place], [address], [place], [place], [phone]            PATIENT REFERRED TO:    [address] [place] [address] [facility] [place]  [facility]  [address] [phone]  Call in 1 day  To discuss your symptoms and further care  DISCHARGE MEDICATIONS:    Discharge Medication List as of 10/29/2018  4:27 PM      CONTINUE these medications which have NOT CHANGED    Details   hyoscyamine (ANASPAZ,LEVSIN) 0 125 MG tablet TAKE 1 TABLET 3-4 TIMES DAILY AS NEEDED , Normal      levocetirizine (XYZAL) 5 MG tablet Take 5 mg by mouth every evening, Until Discontinued, Historical Med      lovastatin (MEVACOR) 10 MG tablet TAKE 1 TABLET AT BEDTIME, Normal      metFORMIN (GLUCOPHAGE) 1000 MG tablet TAKE 1 TABLET TWICE DAILY, Normal      pantoprazole (PROTONIX) 40 mg tablet Take 40 mg by mouth daily, Until Discontinued, Historical Med      quinapril-hydrochlorothiazide (ACCURETIC) 10-12 5 MG per tablet TAKE 1 TABLET DAILY, Normal             No discharge procedures on file           MD Kenneth Ashley MD  10/29/18 2947

## 2018-10-29 NOTE — DISCHARGE INSTRUCTIONS
Knee Pain   WHAT YOU NEED TO KNOW:   Knee pain may start suddenly, or it may be a long-term problem  You may have pain on the side, front, or back of your knee  You may have knee stiffness and swelling  You may hear popping sounds or feel like your knee is giving way or locking up as you walk  You may feel pain when you sit, stand, walk, or climb up and down stairs  Knee pain can be caused by conditions such as obesity, inflammation, or strains or tears in ligaments or tendons  DISCHARGE INSTRUCTIONS:   Follow up with your healthcare provider within 24 hours or as directed: You may need follow-up treatments, such as steroid injections to decrease pain  Write down your questions so you remember to ask them during your visits  Self-care:   · Rest  your knee so it can heal  Limit activities that increase your pain  · Ice  can help reduce swelling  Wrap ice in a towel and put it on your knee for as long and as often as directed  · Compression  with a brace or bandage can help reduce swelling  Use a brace or bandage only as directed  · Elevation  helps decrease pain and swelling  Elevate your knee while you are sitting or lying down  Prop your leg on pillows to keep your knee above the level of your heart  Medicines:   · NSAIDs  help decrease swelling and pain or fever  This medicine is available with or without a doctor's order  NSAIDs can cause stomach bleeding or kidney problems in certain people  If you take blood thinner medicine, always ask your healthcare provider if NSAIDs are safe for you  Always read the medicine label and follow directions  · Acetaminophen  decreases pain and fever  It is available without a doctor's order  Ask how much to take and when to take it  Follow directions  Acetaminophen can cause liver damage if not taken correctly  · Take your medicine as directed  Contact your healthcare provider if you think your medicine is not helping or if you have side effects   Tell him or her if you are allergic to any medicine  Keep a list of the medicines, vitamins, and herbs you take  Include the amounts, and when and why you take them  Bring the list or the pill bottles to follow-up visits  Carry your medicine list with you in case of an emergency  Exercise as directed: You may need to see a physical therapist or do recommended exercises to improve movement and decrease your pain  You may be directed to walk, swim, or ride a bike  Follow your exercise plan exactly as directed to avoid further injury  Contact your healthcare provider if:   · You have questions or concerns about your condition or care  Return to the emergency department if:   · Your pain is worse, even after treatment  · You cannot bend or straighten your leg completely  · The swelling around your knee does not go down even with treatment  · Your knee is painful and hot to the touch  © 2017 [facility] Information is for End User's use only and may not be sold, redistributed or otherwise used for commercial purposes  All illustrations and images included in CareNotes® are the copyrighted property of A D A M , Inc  or Liam Jeffries  The above information is an  only  It is not intended as medical advice for individual conditions or treatments  Talk to your doctor, nurse or pharmacist before following any medical regimen to see if it is safe and effective for you  Osteoarthritis, Ambulatory Care   GENERAL INFORMATION:   Osteoarthritis  occurs when cartilage (tissue that cushions a joint) wears away slowly and causes the bones to rub together  Osteoarthritis (OA) is a long-term condition that often affects the hands, neck, lower back, knees, and hips  OA is also called arthrosis or degenerative joint disease    Common symptoms include the following:   · Joint pain that gets worse when you move the joint     · Joint stiffness that decreases after you move the joint · Decreased range of movement     · Hard, bony enlargement on your fingers or toes    · A grinding or cracking sound when you move your joint  Seek immediate care for the following symptoms:   · Severe pain    · Not able to move your joint  Treatment for osteoarthritis  may include any of the following:  · Acetaminophen  is used to decrease pain  It is available without a doctor's order  Ask how much to take and how often to take it  Follow directions  Acetaminophen can cause liver damage if not taken correctly  · NSAIDs  help decrease swelling and pain or fever  This medicine is available with or without a doctor's order  NSAIDs can cause stomach bleeding or kidney problems in certain people  If you take blood thinner medicine, always ask your healthcare provider if NSAIDs are safe for you  Always read the medicine label and follow directions  · Capsaicin cream  may help decrease pain in your joint  · Prescription pain medicine  may be given to decrease severe pain if other medicines do not work  Take the medicine as directed  Do not wait until the pain is severe before you take your medicine  · A steroid injection  may be given if your symptoms get worse  · Physical therapy  may be ordered by your healthcare provider  A physical therapist teaches you exercises to help improve movement and strength, and to decrease pain in your joints  · Surgery  may be needed if other treatments do not work  Manage osteoarthritis   · Stay active  Physical activity may reduce your pain and improve your ability to do daily activities  Avoid activities that cause pain  Ask your healthcare provider what type of exercise would be best for you  · Maintain a healthy weight  This helps decrease the strain on the joints in your back, hips, knees, ankles, and feet  Ask your healthcare provider how much you should weigh  Ask him to help you create a weight loss plan if you are overweight       · Use heat or ice on your joints as directed  Heat and ice help decrease pain, swelling, and muscle spasms  Use a heating pad on a low setting or take a warm bath  Use an ice pack, or put crushed ice in a plastic bag  Cover it with a towel  · Massage  the muscles around the joint to relieve pain and stiffness  · Use a cane, crutches, or a walker  to protect and relieve pressure on your ankle, knee, and hip joints  You may also be prescribed shoe inserts to decrease pressure in your joints  · Wear flat or low-heeled shoes  This will help decrease pain and reduce pressure on your ankle, knee, and hip joints  Follow up with your healthcare provider as directed:  Write down your questions so you remember to ask them during your visits  CARE AGREEMENT:   You have the right to help plan your care  Learn about your health condition and how it may be treated  Discuss treatment options with your caregivers to decide what care you want to receive  You always have the right to refuse treatment  The above information is an  only  It is not intended as medical advice for individual conditions or treatments  Talk to your doctor, nurse or pharmacist before following any medical regimen to see if it is safe and effective for you  © 2014 [address] is for End User's use only and may not be sold, redistributed or otherwise used for commercial purposes  All illustrations and images included in CareNotes® are the copyrighted property of A [employer] A M , Inc  or Liam Jeffries

## 2018-11-13 ENCOUNTER — OFFICE VISIT (OUTPATIENT)
Dept: OBGYN CLINIC | Facility: CLINIC | Age: 73
End: 2018-11-13
Payer: MEDICARE

## 2018-11-13 VITALS
RESPIRATION RATE: 18 BRPM | HEIGHT: 66 IN | WEIGHT: 190.8 LBS | DIASTOLIC BLOOD PRESSURE: 87 MMHG | BODY MASS INDEX: 30.67 KG/M2 | HEART RATE: 75 BPM | SYSTOLIC BLOOD PRESSURE: 128 MMHG

## 2018-11-13 DIAGNOSIS — M25.562 LEFT KNEE PAIN, UNSPECIFIED CHRONICITY: Primary | ICD-10-CM

## 2018-11-13 PROCEDURE — 99203 OFFICE O/P NEW LOW 30 MIN: CPT | Performed by: PHYSICIAN ASSISTANT

## 2018-11-13 NOTE — PROGRESS NOTES
_____________________________________________________  CHIEF COMPLAINT:  Chief Complaint   Patient presents with    Left Knee - Pain    Right Knee - Pain         SUBJECTIVE:  Francis Hale is a 67y o  year old female who presents   Patient states her right knee pain has been going on for 1 month when she fell in directly onto the knee  Left knee has been going after the injury and she was compensating for the right side  She states she notes achy pain her knee  She also notes some stiffness when sitting for a period of time  She has not been taking anything for the pain with stiffness  She denies any numbness or tingling  She denies any constitutional signs or symptoms    PAST MEDICAL HISTORY:  Past Medical History:   Diagnosis Date    Arthritis     Diabetes mellitus ([address] )     HNP (herniated nucleus pulposus), lumbar     Hyperlipidemia     Hypertension     IBS (irritable bowel syndrome)     Osteoarthritis        PAST SURGICAL HISTORY:  Past Surgical History:   Procedure Laterality Date    PARATHYROID GLAND SURGERY      resolved 05/12    TX ESOPHAGOGASTRODUODENOSCOPY TRANSORAL DIAGNOSTIC N/A 4/19/2017    Procedure: EGD AND COLONOSCOPY;  Surgeon: Darell Chaney MD;  Location: [facility];   Service: Gastroenterology    UMBILICAL HERNIA REPAIR         FAMILY HISTORY:  Family History   Problem Relation Age of Onset    Breast cancer Mother     Arthritis Family     Hypertension Family        SOCIAL HISTORY:  Social History   Substance Use Topics    Smoking status: Never Smoker    Smokeless tobacco: Never Used    Alcohol use No       MEDICATIONS:    Current Outpatient Prescriptions:     hyoscyamine (ANASPAZ,LEVSIN) 0 125 MG tablet, TAKE 1 TABLET 3-4 TIMES DAILY AS NEEDED , Disp: 60 tablet, Rfl: 2    levocetirizine (XYZAL) 5 MG tablet, Take 5 mg by mouth every evening, Disp: , Rfl:     lovastatin (MEVACOR) 10 MG tablet, TAKE 1 TABLET AT BEDTIME, Disp: 90 tablet, Rfl: 1    metFORMIN (GLUCOPHAGE) 1000 MG tablet, TAKE 1 TABLET TWICE DAILY, Disp: 180 tablet, Rfl: 3    pantoprazole (PROTONIX) 40 mg tablet, Take 40 mg by mouth daily, Disp: , Rfl:     quinapril-hydrochlorothiazide (ACCURETIC) 10-12 5 MG per tablet, TAKE 1 TABLET DAILY, Disp: 30 tablet, Rfl: 5    ALLERGIES:  Allergies   Allergen Reactions    Nuts Anaphylaxis     Raw Almonds only    Codeine Other (See Comments)     Dizzy, lightheaded       REVIEW OF SYSTEMS:  General: no fever, no chills  HEENT:  No loss of hearing or eyesight problems  Eyes:  No red eyes  Respiratory:  No coughing, shortness of breath or wheezing  Cardiovascular:  No chest pain, no palpitations  GI:  Abdomen soft nontender, denies nausea  Endocrine:  No muscle weakness, no frequent urination, no excessive thirst  Urinary:  No dysuria, no incontinence  Musculoskeletal: see HPI and PE  SKIN:  No skin rash, no dry skin  Neurological:  No headaches, no confusion  Psychiatric:  No suicide thoughts, no anxiety, no depression  Review of all other systems is negative    LABS:  HgA1c:   Lab Results   Component Value Date    HGBA1C 6 8 (H) 09/26/2018     BMP:   Lab Results   Component Value Date    GLUCOSE 88 07/09/2015    CALCIUM 9 0 09/26/2018     07/09/2015    K 4 3 09/26/2018    CO2 30 09/26/2018     09/26/2018    BUN 19 09/26/2018    CREATININE 1 15 09/26/2018       _____________________________________________________  PHYSICAL EXAMINATION:  General: well developed and well nourished, alert, oriented times 3 and appears comfortable  Psychiatric: Normal  HEENT: Trachea Midline, No torticollis  Cardiovascular: No discernable arrhythmia  Pulmonary: No wheezing or stridor  Skin: No masses, erthema, lacerations, fluctation, ulcerations  Neurovascular:   L1-S1 motor and sensory intact, pulses were compared bilaterally and were equal, capillary refill less than 3 sec      MUSCULOSKELETAL EXAMINATION:  Left knee examination shows skin intact with no evidence of erythema or ecchymosis noted  Range of motion is 0-115 degrees with mild effusion  tenderness noted with palpation over the medial joint line and over anterior knee  Knee is ligamentous stable to varus and valgus stress  Negative Lachman  Negative anterior and posterior drawer  Sensation intact to light touch L1-S1 distributions  5/5 strength with knee flexion extension, ankle flexion extension and flexion extension of all toes    Right knee examination shows skin intact with no evidence of erythema or ecchymosis noted  Range of motion is 0-115 degrees with mild effusion  tenderness noted with palpation over the medial joint line and over anterior knee  Knee is ligamentous stable to varus and valgus stress  Negative Lachman  Negative anterior and posterior drawer  Sensation intact to light touch L1-S1 distributions  5/5 strength with knee flexion extension, ankle flexion extension and flexion extension of all toes    _____________________________________________________  STUDIES REVIEWED:  I personally reviewed the x-rays from 10/29/2018  X-rays demonstrated no acute fractures  Osteo arthritis is appreciated throughout both the left and the right knee with medial joint space narrowing and osteophyte formation of the medial femoral condyle and medial tibial plateau  ASSESSMENT/PLAN:    Diagnoses and all orders for this visit:    Left knee pain, unspecified chronicity        Assessment:   Primary osteoarthritis bilateral knees    Plan: It was discussed with the patient that we can either do steroid injections or gel injections  Patient is diabetic  She would like to forego injections at this time  She was advised to try some anti-inflammatories to help with her pain  She will give this a try  She will give us a call back if she would like to pursue any aspects of injections  A pamphlet was given to her about the gel injections  She will follow up with Orthopedics as needed      Follow Up:  As needed    PROCEDURES PERFORMED:  None

## 2018-11-28 ENCOUNTER — OFFICE VISIT (OUTPATIENT)
Dept: OBGYN CLINIC | Facility: CLINIC | Age: 73
End: 2018-11-28
Payer: MEDICARE

## 2018-11-28 VITALS
DIASTOLIC BLOOD PRESSURE: 78 MMHG | HEART RATE: 64 BPM | SYSTOLIC BLOOD PRESSURE: 134 MMHG | BODY MASS INDEX: 31.07 KG/M2 | HEIGHT: 66 IN | WEIGHT: 193.3 LBS

## 2018-11-28 DIAGNOSIS — M25.561 RIGHT KNEE PAIN, UNSPECIFIED CHRONICITY: ICD-10-CM

## 2018-11-28 DIAGNOSIS — M17.11 PRIMARY OSTEOARTHRITIS OF RIGHT KNEE: ICD-10-CM

## 2018-11-28 DIAGNOSIS — M17.12 PRIMARY OSTEOARTHRITIS OF LEFT KNEE: ICD-10-CM

## 2018-11-28 DIAGNOSIS — M25.562 LEFT KNEE PAIN, UNSPECIFIED CHRONICITY: Primary | ICD-10-CM

## 2018-11-28 PROCEDURE — 20610 DRAIN/INJ JOINT/BURSA W/O US: CPT | Performed by: ORTHOPAEDIC SURGERY

## 2018-11-28 RX ORDER — LOVASTATIN 10 MG/1
10 TABLET ORAL
COMMUNITY
End: 2019-02-21 | Stop reason: SDUPTHER

## 2018-11-28 NOTE — PROGRESS NOTES
Large joint arthrocentesis  Date/Time: 11/28/2018 12:01 PM  Consent given by: patient  Site marked: site marked  Supporting Documentation  Indications: pain (osteoarthritis)   Procedure Details  Location: knee - R knee  Needle size: 25 G  Ultrasound guidance: no  Approach: lateral  Medications administered: 48 mg hylan 48 MG/6ML    Patient tolerance: patient tolerated the procedure well with no immediate complications  Dressing:  Sterile dressing applied  Large joint arthrocentesis  Date/Time: 11/28/2018 12:02 PM  Consent given by: patient  Supporting Documentation  Indications: pain (osteoarthritis)   Procedure Details  Location: knee - L knee  Preparation: Patient was prepped and draped in the usual sterile fashion  Needle size: 25 G  Ultrasound guidance: no  Approach: lateral  Medications administered: 48 mg hylan 48 MG/6ML    Patient tolerance: patient tolerated the procedure well with no immediate complications  Dressing:  Sterile dressing applied      F/U 3 months

## 2018-12-18 ENCOUNTER — TELEPHONE (OUTPATIENT)
Dept: INTERNAL MEDICINE CLINIC | Facility: CLINIC | Age: 73
End: 2018-12-18

## 2018-12-18 DIAGNOSIS — R41.3 MEMORY LOSS: Primary | ICD-10-CM

## 2018-12-18 NOTE — TELEPHONE ENCOUNTER
Pt is requesting a referral for a neurologist    Pt gets headache, she talks to people and says the wrong things  She knows what she wants to say but sentence doesn't come out clearly

## 2018-12-18 NOTE — TELEPHONE ENCOUNTER
Can do referral to Dr Mayelin Mckeon but if this just started happening, she should be seen here first

## 2018-12-19 PROBLEM — R41.3 MEMORY LOSS: Status: ACTIVE | Noted: 2018-12-19

## 2018-12-19 NOTE — TELEPHONE ENCOUNTER
Patient was given message but said that she is already a patient of Dr Shawn Gan but thinks she may need a referral  Patient is going to get in touch with Dr Shawn Gan office to see if she needs a doctor to doctor order

## 2019-01-02 ENCOUNTER — TELEPHONE (OUTPATIENT)
Dept: NEUROLOGY | Facility: CLINIC | Age: 74
End: 2019-01-02

## 2019-01-02 ENCOUNTER — APPOINTMENT (EMERGENCY)
Dept: CT IMAGING | Facility: HOSPITAL | Age: 74
End: 2019-01-02
Payer: MEDICARE

## 2019-01-02 ENCOUNTER — HOSPITAL ENCOUNTER (EMERGENCY)
Facility: HOSPITAL | Age: 74
Discharge: HOME | End: 2019-01-02
Attending: EMERGENCY MEDICINE | Admitting: EMERGENCY MEDICINE
Payer: MEDICARE

## 2019-01-02 VITALS
TEMPERATURE: 98.5 F | OXYGEN SATURATION: 100 % | DIASTOLIC BLOOD PRESSURE: 84 MMHG | HEIGHT: 65 IN | BODY MASS INDEX: 29.99 KG/M2 | SYSTOLIC BLOOD PRESSURE: 174 MMHG | HEART RATE: 60 BPM | RESPIRATION RATE: 18 BRPM | WEIGHT: 180 LBS

## 2019-01-02 DIAGNOSIS — R53.83 FATIGUE: ICD-10-CM

## 2019-01-02 DIAGNOSIS — I10 HYPERTENSION: ICD-10-CM

## 2019-01-02 DIAGNOSIS — R41.3 MEMORY CHANGES: Primary | ICD-10-CM

## 2019-01-02 LAB
ALBUMIN SERPL BCP-MCNC: 3.2 G/DL (ref 3.5–5)
ALP SERPL-CCNC: 126 U/L (ref 46–116)
ALT SERPL W P-5'-P-CCNC: 32 U/L (ref 12–78)
ANION GAP SERPL CALCULATED.3IONS-SCNC: 10 MMOL/L (ref 4–13)
AST SERPL W P-5'-P-CCNC: 22 U/L (ref 5–45)
ATRIAL RATE: 61 BPM
BACTERIA UR QL AUTO: ABNORMAL /HPF
BASOPHILS # BLD AUTO: 0.03 THOUSANDS/ΜL (ref 0–0.1)
BASOPHILS NFR BLD AUTO: 1 % (ref 0–1)
BILIRUB SERPL-MCNC: 0.3 MG/DL (ref 0.2–1)
BILIRUB UR QL STRIP: NEGATIVE
BUN SERPL-MCNC: 22 MG/DL (ref 5–25)
CALCIUM SERPL-MCNC: 9.4 MG/DL (ref 8.3–10.1)
CHLORIDE SERPL-SCNC: 102 MMOL/L (ref 100–108)
CLARITY UR: CLEAR
CO2 SERPL-SCNC: 29 MMOL/L (ref 21–32)
COLOR UR: YELLOW
CREAT SERPL-MCNC: 1.14 MG/DL (ref 0.6–1.3)
EOSINOPHIL # BLD AUTO: 0.28 THOUSAND/ΜL (ref 0–0.61)
EOSINOPHIL NFR BLD AUTO: 5 % (ref 0–6)
ERYTHROCYTE [DISTWIDTH] IN BLOOD BY AUTOMATED COUNT: 13.2 % (ref 11.6–15.1)
GFR SERPL CREATININE-BSD FRML MDRD: 55 ML/MIN/1.73SQ M
GLUCOSE SERPL-MCNC: 107 MG/DL (ref 65–140)
GLUCOSE UR STRIP-MCNC: NEGATIVE MG/DL
HCT VFR BLD AUTO: 41.6 % (ref 34.8–46.1)
HGB BLD-MCNC: 13.5 G/DL (ref 11.5–15.4)
HGB UR QL STRIP.AUTO: ABNORMAL
HYALINE CASTS #/AREA URNS LPF: ABNORMAL /LPF
IMM GRANULOCYTES # BLD AUTO: 0.01 THOUSAND/UL (ref 0–0.2)
IMM GRANULOCYTES NFR BLD AUTO: 0 % (ref 0–2)
KETONES UR STRIP-MCNC: NEGATIVE MG/DL
LEUKOCYTE ESTERASE UR QL STRIP: ABNORMAL
LYMPHOCYTES # BLD AUTO: 1.74 THOUSANDS/ΜL (ref 0.6–4.47)
LYMPHOCYTES NFR BLD AUTO: 32 % (ref 14–44)
MCH RBC QN AUTO: 28.7 PG (ref 26.8–34.3)
MCHC RBC AUTO-ENTMCNC: 32.5 G/DL (ref 31.4–37.4)
MCV RBC AUTO: 89 FL (ref 82–98)
MONOCYTES # BLD AUTO: 0.58 THOUSAND/ΜL (ref 0.17–1.22)
MONOCYTES NFR BLD AUTO: 11 % (ref 4–12)
NEUTROPHILS # BLD AUTO: 2.76 THOUSANDS/ΜL (ref 1.85–7.62)
NEUTS SEG NFR BLD AUTO: 51 % (ref 43–75)
NITRITE UR QL STRIP: NEGATIVE
NON-SQ EPI CELLS URNS QL MICRO: ABNORMAL /HPF
NRBC BLD AUTO-RTO: 0 /100 WBCS
P AXIS: 54 DEGREES
PH UR STRIP.AUTO: 5.5 [PH] (ref 4.5–8)
PLATELET # BLD AUTO: 261 THOUSANDS/UL (ref 149–390)
PMV BLD AUTO: 9.4 FL (ref 8.9–12.7)
POTASSIUM SERPL-SCNC: 3.8 MMOL/L (ref 3.5–5.3)
PR INTERVAL: 132 MS
PROT SERPL-MCNC: 7.7 G/DL (ref 6.4–8.2)
PROT UR STRIP-MCNC: NEGATIVE MG/DL
QRS AXIS: 64 DEGREES
QRSD INTERVAL: 90 MS
QT INTERVAL: 420 MS
QTC INTERVAL: 422 MS
RBC # BLD AUTO: 4.7 MILLION/UL (ref 3.81–5.12)
RBC #/AREA URNS AUTO: ABNORMAL /HPF
SODIUM SERPL-SCNC: 141 MMOL/L (ref 136–145)
SP GR UR STRIP.AUTO: 1.02 (ref 1–1.03)
T WAVE AXIS: -13 DEGREES
T4 FREE SERPL-MCNC: 0.98 NG/DL (ref 0.76–1.46)
TROPONIN I SERPL-MCNC: <0.02 NG/ML
TSH SERPL DL<=0.05 MIU/L-ACNC: 1.47 UIU/ML (ref 0.36–3.74)
UROBILINOGEN UR QL STRIP.AUTO: 0.2 E.U./DL
VENTRICULAR RATE: 61 BPM
WBC # BLD AUTO: 5.4 THOUSAND/UL (ref 4.31–10.16)
WBC #/AREA URNS AUTO: ABNORMAL /HPF

## 2019-01-02 PROCEDURE — 93010 ELECTROCARDIOGRAM REPORT: CPT | Performed by: INTERNAL MEDICINE

## 2019-01-02 PROCEDURE — 84443 ASSAY THYROID STIM HORMONE: CPT | Performed by: EMERGENCY MEDICINE

## 2019-01-02 PROCEDURE — 99284 EMERGENCY DEPT VISIT MOD MDM: CPT

## 2019-01-02 PROCEDURE — 70450 CT HEAD/BRAIN W/O DYE: CPT

## 2019-01-02 PROCEDURE — 85025 COMPLETE CBC W/AUTO DIFF WBC: CPT | Performed by: EMERGENCY MEDICINE

## 2019-01-02 PROCEDURE — 84484 ASSAY OF TROPONIN QUANT: CPT | Performed by: EMERGENCY MEDICINE

## 2019-01-02 PROCEDURE — 80053 COMPREHEN METABOLIC PANEL: CPT | Performed by: EMERGENCY MEDICINE

## 2019-01-02 PROCEDURE — 93005 ELECTROCARDIOGRAM TRACING: CPT

## 2019-01-02 PROCEDURE — 84439 ASSAY OF FREE THYROXINE: CPT | Performed by: EMERGENCY MEDICINE

## 2019-01-02 PROCEDURE — 36415 COLL VENOUS BLD VENIPUNCTURE: CPT | Performed by: EMERGENCY MEDICINE

## 2019-01-02 PROCEDURE — 81001 URINALYSIS AUTO W/SCOPE: CPT | Performed by: EMERGENCY MEDICINE

## 2019-01-02 NOTE — ED PROVIDER NOTES
History  Chief Complaint   Patient presents with    Dizziness     pt c/o high blood pressure, headaches and dizziness since june  HPI     70-year-old female with history of hypertension, hyperlipidemia, non-insulin-dependent diabetes who presents for evaluation of high blood pressure with intermittent headaches and word-finding difficulty since June  Patient states that she has noticed that her blood pressures have been elevated to as high as 170/110 intermittently over this period of time  Endorses some occipital throbbing headaches that do not always occur when her blood pressure is elevated, come and go, not severe at onset or sudden in onset, not present currently  No neck pain or stiffness  States that sometimes she can't find the words that she wants to say and when she does find the word it is the wrong word  States that she decided to come in today because her daughter noticed this and encouraged her to come in  Denies focal weakness or sensory deficits, notices that sometimes her lips feel numb, but no other areas of numbness or focal weakness  States that she has been more fatigued than usual over the last couple of weeks but denies chest pain or shortness of breath  No recent illnesses, fevers, chills, nausea, vomiting, diaphoresis, or diarrhea  No known cardiac history  States that sometimes when she is standing for prolonged periods of time she gets dizzy which she describes as feeling off balance, but no difficulty walking, this is not present currently, has been occurring on and off for months, has not led to any falls  Denies palpitations  No aggravating or alleviating factors or other associated symptoms  Prior to Admission Medications   Prescriptions Last Dose Informant Patient Reported? Taking?   hyoscyamine (ANASPAZ,LEVSIN) 0 125 MG tablet  Self No Yes   Sig: TAKE 1 TABLET 3-4 TIMES DAILY AS NEEDED     levocetirizine (XYZAL) 5 MG tablet  Self Yes Yes   Sig: Take 5 mg by mouth every evening   lovastatin (MEVACOR) 10 MG tablet  Self Yes Yes   Sig: Take 10 mg by mouth daily at bedtime   metFORMIN (GLUCOPHAGE) 1000 MG tablet  Self No Yes   Sig: TAKE 1 TABLET TWICE DAILY   pantoprazole (PROTONIX) 40 mg tablet  Self Yes Yes   Sig: Take 40 mg by mouth daily   quinapril-hydrochlorothiazide (ACCURETIC) 10-12 5 MG per tablet  Self No Yes   Sig: TAKE 1 TABLET DAILY      Facility-Administered Medications: None       Past Medical History:   Diagnosis Date    Arthritis     Diabetes mellitus ([address] )     HNP (herniated nucleus pulposus), lumbar     Hyperlipidemia     Hypertension     IBS (irritable bowel syndrome)     Osteoarthritis        Past Surgical History:   Procedure Laterality Date    PARATHYROID GLAND SURGERY      resolved 05/12    PA ESOPHAGOGASTRODUODENOSCOPY TRANSORAL DIAGNOSTIC N/A 4/19/2017    Procedure: EGD AND COLONOSCOPY;  Surgeon: Caro Correa MD;  Location: [facility]; Service: Gastroenterology    UMBILICAL HERNIA REPAIR         Family History   Problem Relation Age of Onset    Breast cancer Mother     Arthritis Family     Hypertension Family      I have reviewed and agree with the history as documented  Social History   Substance Use Topics    Smoking status: Never Smoker    Smokeless tobacco: Never Used    Alcohol use No        Review of Systems   Constitutional: Positive for fatigue  Negative for chills and fever  HENT: Negative for congestion  Eyes: Negative for visual disturbance  Respiratory: Negative for cough and shortness of breath  Cardiovascular: Negative for chest pain and leg swelling  Gastrointestinal: Negative for abdominal pain, diarrhea, nausea and vomiting  Genitourinary: Negative for dysuria and frequency  Musculoskeletal: Negative for arthralgias, back pain, neck pain and neck stiffness  Skin: Negative for rash  Neurological: Positive for dizziness (not present currently) and headaches (not present currently)   Negative for tremors, seizures, syncope, facial asymmetry, speech difficulty, weakness, light-headedness and numbness  Word-finding difficulty   Psychiatric/Behavioral: Negative for agitation, behavioral problems and confusion  Physical Exam  Physical Exam   Constitutional: She is oriented to person, place, and time  She appears well-developed and well-nourished  No distress  HENT:   Head: Normocephalic and atraumatic  Right Ear: External ear normal    Left Ear: External ear normal    Nose: Nose normal    Mouth/Throat: Oropharynx is clear and moist    Eyes: Pupils are equal, round, and reactive to light  Conjunctivae and EOM are normal    Neck: Normal range of motion  Neck supple  Cardiovascular: Normal rate, regular rhythm, normal heart sounds and intact distal pulses  Exam reveals no gallop and no friction rub  No murmur heard  Pulmonary/Chest: Effort normal and breath sounds normal  No respiratory distress  She has no wheezes  She has no rales  Abdominal: Soft  Bowel sounds are normal  She exhibits no distension  There is no tenderness  There is no guarding  Musculoskeletal: Normal range of motion  She exhibits no edema or deformity  Neurological: She is alert and oriented to person, place, and time  She exhibits normal muscle tone  Face symmetric, tongue midline, 5/5 strength in the proximal and distal upper and lower extremities bilaterally with intact sensation to light touch throughout  CN II-XII intact  Normal finger-to-nose, rapid alternating movements, and heel-to-shin bilaterally  Normal speech, normal gait  No pronator drift  Skin: Skin is warm and dry  She is not diaphoretic  Psychiatric: She has a normal mood and affect         Vital Signs  ED Triage Vitals [01/02/19 1350]   Temperature Pulse Respirations Blood Pressure SpO2   98 5 °F (36 9 °C) 76 20 (!) 177/111 100 %      Temp Source Heart Rate Source Patient Position - Orthostatic VS BP Location FiO2 (%)   Oral Monitor Sitting Left arm --      Pain Score       5           Vitals:    01/02/19 1350 01/02/19 1746   BP: (!) 177/111 (!) 174/84   Pulse: 76 60   Patient Position - Orthostatic VS: Sitting Lying       Visual Acuity  Visual Acuity      Most Recent Value   L Pupil Size (mm)  3   R Pupil Size (mm)  3          ED Medications  Medications - No data to display    Diagnostic Studies  Results Reviewed     Procedure Component Value Units Date/Time    T4, free [[id number]]  (Normal) Collected:  01/02/19 1530    Lab Status:  Final result Specimen:  Blood from Arm, Right Updated:  01/02/19 1842     Free T4 0 98 ng/dL     TSH [[id number]]  (Normal) Collected:  01/02/19 1530    Lab Status:  Final result Specimen:  Blood from Arm, Right Updated:  01/02/19 1606     TSH 3RD GENERATON 1 473 uIU/mL     Narrative:         Patients undergoing fluorescein dye angiography may retain small amounts of fluorescein in the body for 48-72 hours post procedure  Samples containing fluorescein can produce falsely depressed TSH values  If the patient had this procedure,a specimen should be resubmitted post fluorescein clearance            The recommended reference ranges for TSH during pregnancy are as follows:  First trimester 0 1 to 2 5 uIU/mL  Second trimester  0 2 to 3 0 uIU/mL  Third trimester 0 3 to 3 0 uIU/m      Troponin I [[id number]]  (Normal) Collected:  01/02/19 1530    Lab Status:  Final result Specimen:  Blood from Arm, Right Updated:  01/02/19 1602     Troponin I <0 02 ng/mL     Urinalysis with microscopic [[id number]]  (Abnormal) Collected:  01/02/19 1532    Lab Status:  Final result Specimen:  Urine from Urine, Clean Catch Updated:  01/02/19 1559     Clarity, UA Clear     Color, UA Yellow     Specific Gravity, UA 1 025     pH, UA 5 5     Glucose, UA Negative mg/dl      Ketones, UA Negative mg/dl      Blood, UA Trace-lysed (A)     Protein, UA Negative mg/dl      Nitrite, UA Negative     Bilirubin, UA Negative     Urobilinogen, UA 0 2 E U /dl Leukocytes, UA Small (A)     WBC, UA 4-10 (A) /hpf      RBC, UA 0-1 (A) /hpf      Hyaline Casts, UA 0-1 (A) /lpf      Bacteria, UA Occasional /hpf      Epithelial Cells Occasional /hpf     Comprehensive metabolic panel [[id number]]  (Abnormal) Collected:  01/02/19 1530    Lab Status:  Final result Specimen:  Blood from Arm, Right Updated:  01/02/19 1557     Sodium 141 mmol/L      Potassium 3 8 mmol/L      Chloride 102 mmol/L      CO2 29 mmol/L      ANION GAP 10 mmol/L      BUN 22 mg/dL      Creatinine 1 14 mg/dL      Glucose 107 mg/dL      Calcium 9 4 mg/dL      AST 22 U/L      ALT 32 U/L      Alkaline Phosphatase 126 (H) U/L      Total Protein 7 7 g/dL      Albumin 3 2 (L) g/dL      Total Bilirubin 0 30 mg/dL      eGFR 55 ml/min/1 73sq m     Narrative:         National Kidney Disease Education Program recommendations are as follows:  GFR calculation is accurate only with a steady state creatinine  Chronic Kidney disease less than 60 ml/min/1 73 sq  meters  Kidney failure less than 15 ml/min/1 73 sq  meters      CBC and differential [[id number]] Collected:  01/02/19 1530    Lab Status:  Final result Specimen:  Blood from Arm, Right Updated:  01/02/19 1538     WBC 5 40 Thousand/uL      RBC 4 70 Million/uL      Hemoglobin 13 5 g/dL      Hematocrit 41 6 %      MCV 89 fL      MCH 28 7 pg      MCHC 32 5 g/dL      RDW 13 2 %      MPV 9 4 fL      Platelets 428 Thousands/uL      nRBC 0 /100 WBCs      Neutrophils Relative 51 %      Immat GRANS % 0 %      Lymphocytes Relative 32 %      Monocytes Relative 11 %      Eosinophils Relative 5 %      Basophils Relative 1 %      Neutrophils Absolute 2 76 Thousands/µL      Immature Grans Absolute 0 01 Thousand/uL      Lymphocytes Absolute 1 74 Thousands/µL      Monocytes Absolute 0 58 Thousand/µL      Eosinophils Absolute 0 28 Thousand/µL      Basophils Absolute 0 03 Thousands/µL                  CT head without contrast   Final Result by Delores Murrell MD (01/02 1521)      No acute intracranial abnormality  Microangiopathic changes  Workstation performed: [id number]                    Procedures  Procedures       Phone Contacts  ED Phone Contact    ED Course           Identification of Seniors at Risk      Most Recent Value   (ISAR) Identification of Seniors at Risk   Before the illness or injury that brought you to the Emergency, did you need someone to help you on a regular basis? 0 Filed at: 01/02/2019 1352   In the last 24 hours, have you needed more help than usual?  0 Filed at: 01/02/2019 1352   Have you been hospitalized for one or more nights during the past 6 months? 0 Filed at: 01/02/2019 1352   In general, do you see well?  0 Filed at: 01/02/2019 1352   In general, do you have serious problems with your memory? 1 Filed at: 01/02/2019 1352   Do you take more than three different medications every day?  0 Filed at: 01/02/2019 1352   ISAR Score  1 Filed at: 01/02/2019 1352                          MDM  Number of Diagnoses or Management Options  Fatigue: new and requires workup  Hypertension: established and worsening  Memory changes: new and requires workup  Diagnosis management comments: Patient is generally well appearing  Afebrile and hemodynamically stable  She currently denies headache  Neurologic exam performed as above which is unremarkable and I have a low suspicion for CVA or TIA, but CT head was obtained which is on remarkable with the exception of microvascular changes  Discussed with the patient that her word-finding difficulty may be secondary to a neurocognitive condition such as dementia, and recommend formal workup for this by her primary care physician  I personally interpreted the patient's EKG which reveals normal rate, normal sinus rhythm, normal axis, normal intervals, nonspecific T-wave abnormality in lead 3, no ST segment deviation  Single troponin obtained secondary to fatigue that is undetectable    Patient denies any chest pain or shortness of breath and there is no indication for repeat troponin  TSH within normal limits and doubt thyroid dysfunction as the cause for symptoms  CBC with no leukocytosis and normal hemoglobin  CMP unremarkable  Urinalysis without evidence of UTI  Patient denies further infectious symptoms  She is not dizzy and is asymptomatic here  Patient is hypertensive here to 177/111, recommend follow up with her PCP for adjustments to her blood pressure medication  No signs of end-organ damage on labs here today to necessitate admission  Patient is stable for discharge home with return precautions discussed  Amount and/or Complexity of Data Reviewed  Clinical lab tests: ordered and reviewed  Tests in the radiology section of CPT®: ordered and reviewed  Review and summarize past medical records: yes  Independent visualization of images, tracings, or specimens: yes    Patient Progress  Patient progress: stable    CritCare Time         Disposition  Final diagnoses:   Memory changes   Fatigue   Hypertension     Time reflects when diagnosis was documented in both MDM as applicable and the Disposition within this note     Time User Action Codes Description Comment    1/2/2019  5:26 PM Amy Cary [R41 3] Memory changes     1/2/2019  5:26 PM Amy Cary [R53 [id number]] Fatigue     1/2/2019  5:26 PM Elbert, [address] [facility] Hypertension       ED Disposition     ED Disposition Condition Comment    Discharge  Malcolm Goldmann discharge to home/self care  Condition at discharge: Good        Follow-up Information     Follow up With Specialties Details Why Contact Info    Prince Jesusita MD Internal Medicine In 1 week For neurocognitive evaluation for further workup of your word-finding difficulty, and for adjustments to your blood pressure medications    [address]  [address] [facility]            Discharge Medication List as of 1/2/2019  5:27 PM      CONTINUE these medications which have NOT CHANGED    Details   hyoscyamine (ANASPAZ,LEVSIN) 0 125 MG tablet TAKE 1 TABLET 3-4 TIMES DAILY AS NEEDED , Normal      levocetirizine (XYZAL) 5 MG tablet Take 5 mg by mouth every evening, Historical Med      lovastatin (MEVACOR) 10 MG tablet Take 10 mg by mouth daily at bedtime, Historical Med      metFORMIN (GLUCOPHAGE) 1000 MG tablet TAKE 1 TABLET TWICE DAILY, Normal      pantoprazole (PROTONIX) 40 mg tablet Take 40 mg by mouth daily, Historical Med      quinapril-hydrochlorothiazide (ACCURETIC) 10-12 5 MG per tablet TAKE 1 TABLET DAILY, Normal           No discharge procedures on file      ED Provider  Electronically Signed by           Obdulia Richards MD  01/02/19 4603

## 2019-01-02 NOTE — DISCHARGE INSTRUCTIONS
Hypertension   WHAT YOU NEED TO KNOW:   Hypertension is high blood pressure (BP)  Your BP is the force of your blood moving against the walls of your arteries  Normal BP is less than 120/80  Prehypertension is between 120/80 and 139/89  Hypertension is 140/90 or higher  Hypertension causes your BP to get so high that your heart has to work much harder than normal  This can damage your heart  You can control hypertension with a healthy lifestyle or medicines  A controlled blood pressure helps protect your organs, such as your heart, lungs, brain, and kidneys  DISCHARGE INSTRUCTIONS:   Call 911 for any of the following:   · You have discomfort in your chest that feels like squeezing, pressure, fullness, or pain  · You become confused or have difficulty speaking  · You suddenly feel lightheaded or have trouble breathing  · You have pain or discomfort in your back, neck, jaw, stomach, or arm  Seek care immediately if:   · You have a severe headache or vision loss  · You have weakness in an arm or leg  Contact your healthcare provider if:   · You feel faint, dizzy, confused, or drowsy  · You have been taking your BP medicine and your BP is still higher than your healthcare provider says it should be  · You have questions or concerns about your condition or care  Medicines: You may  need any of the following:  · Medicine  may be used to help lower your BP  You may need more than one type of medicine  Take the medicine exactly as directed  · Diuretics  help decrease extra fluid that collects in your body  This will help lower your BP  You may urinate more often while you take this medicine  · Cholesterol medicine  helps lower your cholesterol level  A low cholesterol level helps prevent heart disease and makes it easier to control your blood pressure  · Take your medicine as directed    Contact your healthcare provider if you think your medicine is not helping or if you have side effects  Tell him or her if you are allergic to any medicine  Keep a list of the medicines, vitamins, and herbs you take  Include the amounts, and when and why you take them  Bring the list or the pill bottles to follow-up visits  Carry your medicine list with you in case of an emergency  Follow up with your healthcare provider as directed: You will need to return to have your BP checked and to have other lab tests done  Write down your questions so you remember to ask them during your visits  Manage hypertension:  Talk with your healthcare provider about these and other ways to manage hypertension:  · Check your BP at home  Sit and rest for 5 minutes before you take your BP  Extend your arm and support it on a flat surface  Your arm should be at the same level as your heart  Follow the directions that came with your BP monitor  If possible, take at least 2 BP readings each time  Take your BP at least twice a day at the same times each day, such as morning and evening  Keep a record of your BP readings and bring it to your follow-up visits  Ask your healthcare provider what your BP should be  · Limit sodium (salt) as directed  Too much sodium can affect your fluid balance  Check labels to find low-sodium or no-salt-added foods  Some low-sodium foods use potassium salts for flavor  Too much potassium can also cause health problems  Your healthcare provider will tell you how much sodium and potassium are safe for you to have in a day  He or she may recommend that you limit sodium to 2,300 mg a day  · Follow the meal plan recommended by your healthcare provider  A dietitian or your provider can give you more information on low-sodium plans or the DASH (Dietary Approaches to Stop Hypertension) eating plan  The DASH plan is low in sodium, unhealthy fats, and total fat  It is high in potassium, calcium, and fiber  · Exercise to maintain a healthy weight    Exercise at least 30 minutes per day, on most days of the week  This will help decrease your blood pressure  Ask your healthcare provider about the best exercise plan for you  · Decrease stress  This may help lower your BP  Learn ways to relax, such as deep breathing or listening to music  · Limit alcohol  Women should limit alcohol to 1 drink a day  Men should limit alcohol to 2 drinks a day  A drink of alcohol is 12 ounces of beer, 5 ounces of wine, or 1½ ounces of liquor  · Do not smoke  Nicotine and other chemicals in cigarettes and cigars can increase your BP and also cause lung damage  Ask your healthcare provider for information if you currently smoke and need help to quit  E-cigarettes or smokeless tobacco still contain nicotine  Talk to your healthcare provider before you use these products  · Manage any other health conditions you have  Health conditions such as diabetes can increase your risk for hypertension  Follow your healthcare provider's instructions and take all your medicines as directed  © 2017 [address] [employer]  Information is for End User's use only and may not be sold, redistributed or otherwise used for commercial purposes  All illustrations and images included in CareNotes® are the copyrighted property of [employer]  or Liam Jeff[employer]  The above information is an  only  It is not intended as medical advice for individual conditions or treatments  Talk to your doctor, nurse or pharmacist before following any medical regimen to see if it is safe and effective for you

## 2019-01-02 NOTE — TELEPHONE ENCOUNTER
Pt called c/o ongoing and worsening dizziness, shakiness, headaches and episodes of high blood pressure  Usually her bp run as high as 170/105  Current bp is 154/104  Denies chest pain, sob at this time  Pt states that this has been going on since June but worsening  [facility] appt scheduled 1/16/19  Advised pt to go to ER to get evaluated  Pt will be going to [facility] ed  Any other recommendation?           [phone]

## 2019-01-02 NOTE — TELEPHONE ENCOUNTER
Know, the patient is not been seen by me before  She does apparently have an appointment to be seen in a couple of weeks  Going to the emergency room would be appropriate    Thank you

## 2019-01-07 ENCOUNTER — OFFICE VISIT (OUTPATIENT)
Dept: INTERNAL MEDICINE CLINIC | Facility: CLINIC | Age: 74
End: 2019-01-07
Payer: MEDICARE

## 2019-01-07 VITALS
HEART RATE: 70 BPM | BODY MASS INDEX: 32.15 KG/M2 | DIASTOLIC BLOOD PRESSURE: 72 MMHG | OXYGEN SATURATION: 98 % | HEIGHT: 65 IN | WEIGHT: 193 LBS | RESPIRATION RATE: 18 BRPM | SYSTOLIC BLOOD PRESSURE: 132 MMHG

## 2019-01-07 DIAGNOSIS — R51.9 NONINTRACTABLE EPISODIC HEADACHE, UNSPECIFIED HEADACHE TYPE: ICD-10-CM

## 2019-01-07 DIAGNOSIS — R47.81 SLURRED SPEECH: ICD-10-CM

## 2019-01-07 DIAGNOSIS — I10 ACCELERATED HYPERTENSION: Primary | ICD-10-CM

## 2019-01-07 PROCEDURE — 99214 OFFICE O/P EST MOD 30 MIN: CPT | Performed by: INTERNAL MEDICINE

## 2019-01-07 RX ORDER — ASPIRIN 81 MG/1
81 TABLET ORAL DAILY
Refills: 0
Start: 2019-01-07

## 2019-01-07 RX ORDER — ASPIRIN 81 MG/1
81 TABLET ORAL ONCE
Status: DISCONTINUED | OUTPATIENT
Start: 2019-01-07 | End: 2019-01-07

## 2019-01-07 NOTE — PATIENT INSTRUCTIONS
Start a baby aspirin daily  Bring BP machine to appointment later this week  Check sugars if you have symptoms to make sure you do not have a low sugar

## 2019-01-07 NOTE — PROGRESS NOTES
Assessment/Plan:     I told her we should rule out that these are not TIAs or that she had a small stroke as opposed to the memory loss and dementia she was starting to get worried about  Her repeat blood pressure was also controlled  Because of this, I told her to continue her present blood pressure medicine twice a day as she has been doing in return later in the week but also bring her blood pressure machine with her  Ordered further testing and suggested she start a baby aspirin daily  Also suggested she monitor her sugars more routinely to make sure she is not having low sugars contributing to her symptoms  Return in about 3 years (around 1/7/2022)  No problem-specific Assessment & Plan notes found for this encounter  Diagnoses and all orders for this visit:    Accelerated hypertension    Nonintractable episodic headache, unspecified headache type  -     VAS carotid complete study; Future  -     MRI brain w wo contrast; Future  -     Echo complete with contrast if indicated; Future  -     aspirin (ECOTRIN LOW STRENGTH) EC tablet 81 mg; Take 1 tablet (81 mg total) by mouth once     Slurred speech  -     VAS carotid complete study; Future  -     MRI brain w wo contrast; Future  -     Echo complete with contrast if indicated; Future  -     aspirin (ECOTRIN LOW STRENGTH) EC tablet 81 mg; Take 1 tablet (81 mg total) by mouth once           Subjective:      Patient ID: Anupama Barry is a 68 y o  female  Patient comes in today for emergency room follow-up  She had gone to the ER last week because of high blood pressure  She reports that her home readings have been high for a while now but her readings here and at other offices have been controlled  She states she has been getting pain in her neck on the right side that travels up to her forehead  She then notices that her pressure is high  She tried increasing her blood pressure medicine to twice a day    She reports episodes of memory problems and that is why she wanted to see her neurologist   However, it is that she has trouble naming things at times  She states this is episodic  Other days she will be fine  She does not complain of any arm or leg weakness  She does not complain of passing out          ALLERGIES:  Allergies   Allergen Reactions    Nuts Anaphylaxis     Raw Almonds only    Codeine Other (See Comments)     Dizzy, lightheaded       CURRENT MEDICATIONS:    Current Outpatient Prescriptions:     hyoscyamine (ANASPAZ,LEVSIN) 0 125 MG tablet, TAKE 1 TABLET 3-4 TIMES DAILY AS NEEDED , Disp: 60 tablet, Rfl: 2    levocetirizine (XYZAL) 5 MG tablet, Take 5 mg by mouth every evening, Disp: , Rfl:     lovastatin (MEVACOR) 10 MG tablet, Take 10 mg by mouth daily at bedtime, Disp: , Rfl:     metFORMIN (GLUCOPHAGE) 1000 MG tablet, TAKE 1 TABLET TWICE DAILY, Disp: 180 tablet, Rfl: 3    pantoprazole (PROTONIX) 40 mg tablet, Take 40 mg by mouth daily, Disp: , Rfl:     quinapril-hydrochlorothiazide (ACCURETIC) 10-12 5 MG per tablet, TAKE 1 TABLET DAILY, Disp: 30 tablet, Rfl: 5    Current Facility-Administered Medications:     aspirin (ECOTRIN LOW STRENGTH) EC tablet 81 mg, 81 mg, Oral, Once, Lester Colon MD    ACTIVE PROBLEM LIST:  Patient Active Problem List   Diagnosis    Allergic rhinitis    Hyperlipidemia    Hypertension, benign    Irritable bowel syndrome without diarrhea    Type 2 diabetes mellitus without complication, without long-term current use of insulin ([facility] [address] )    Screening for malignant neoplasm of breast    Abnormal mammogram    Breast pain, right    Paresthesia of both feet    Bilateral cold feet    Chronic bilateral low back pain    Memory loss       PAST MEDICAL HISTORY:  Past Medical History:   Diagnosis Date    Arthritis     Diabetes mellitus ([address] )     HNP (herniated nucleus pulposus), lumbar     Hyperlipidemia     Hypertension     IBS (irritable bowel syndrome)     Osteoarthritis        PAST SURGICAL HISTORY:  Past Surgical History:   Procedure Laterality Date    PARATHYROID GLAND SURGERY      resolved 05/12    CA ESOPHAGOGASTRODUODENOSCOPY TRANSORAL DIAGNOSTIC N/A 4/19/2017    Procedure: EGD AND COLONOSCOPY;  Surgeon: Jose J Berry MD;  Location: [facility]; Service: Gastroenterology    UMBILICAL HERNIA REPAIR         FAMILY HISTORY:  Family History   Problem Relation Age of Onset    Breast cancer Mother     Arthritis Family     Hypertension Family        SOCIAL HISTORY:  Social History     Social History    Marital status: /Civil Union     Spouse name: N/A    Number of children: 2    Years of education: N/A     Occupational History    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     Social History Main Topics    Smoking status: Never Smoker    Smokeless tobacco: Never Used    Alcohol use No    Drug use: No    Sexual activity: Yes     Partners: Male     Other Topics Concern    Not on file     Social History Narrative    No narrative on file       Review of Systems   Respiratory: Negative for shortness of breath  Cardiovascular: Negative for chest pain  Gastrointestinal: Negative for abdominal pain  Objective:  Vitals:    01/07/19 1317   BP: 132/72   BP Location: Left arm   Patient Position: Sitting   Cuff Size: Adult   Pulse: 70   Resp: 18   SpO2: 98%   Weight: 87 5 kg (193 lb)   Height: 5' 5" (1 651 m)     Body mass index is 32 12 kg/m²  Physical Exam   Constitutional: She is oriented to person, place, and time  She appears well-developed and well-nourished  Cardiovascular: Normal rate, regular rhythm and normal heart sounds  Pulmonary/Chest: Effort normal and breath sounds normal    Abdominal: Soft  There is no tenderness  Neurological: She is alert and oriented to person, place, and time  Psychiatric: She has a normal mood and affect  Nursing note and vitals reviewed          RESULTS:    Recent Results (from the past 1008 hour(s))   ECG 12 lead Collection Time: 01/02/19  3:26 PM   Result Value Ref Range    Ventricular Rate 61 BPM    Atrial Rate 61 BPM    NM Interval 132 ms    QRSD Interval 90 ms    QT Interval 420 ms    QTC Interval 422 ms    P [place] 54 degrees    QRS Axis 64 degrees    T Wave Axis -13 degrees   CBC and differential    Collection Time: 01/02/19  3:30 PM   Result Value Ref Range    WBC 5 40 4 31 - 10 16 Thousand/uL    RBC 4 70 3 81 - 5 12 Million/uL    Hemoglobin 13 5 11 5 - 15 4 g/dL    Hematocrit 41 6 34 8 - 46 1 %    MCV 89 82 - 98 fL    MCH 28 7 26 8 - 34 3 pg    MCHC 32 5 31 4 - 37 4 g/dL    RDW 13 2 11 6 - 15 1 %    MPV 9 4 8 9 - 12 7 fL    Platelets 533 145 - 890 Thousands/uL    nRBC 0 /100 WBCs    Neutrophils Relative 51 43 - 75 %    Immat GRANS % 0 0 - 2 %    Lymphocytes Relative 32 14 - 44 %    Monocytes Relative 11 4 - 12 %    Eosinophils Relative 5 0 - 6 %    Basophils Relative 1 0 - 1 %    Neutrophils Absolute 2 76 1 85 - 7 62 Thousands/µL    Immature Grans Absolute 0 01 0 00 - 0 20 Thousand/uL    Lymphocytes Absolute 1 74 0 60 - 4 47 Thousands/µL    Monocytes Absolute 0 58 0 17 - 1 22 Thousand/µL    Eosinophils Absolute 0 28 0 00 - 0 61 Thousand/µL    Basophils Absolute 0 03 0 00 - 0 10 Thousands/µL   Comprehensive metabolic panel    Collection Time: 01/02/19  3:30 PM   Result Value Ref Range    Sodium 141 136 - 145 mmol/L    Potassium 3 8 3 5 - 5 3 mmol/L    Chloride 102 100 - 108 mmol/L    CO2 29 21 - 32 mmol/L    ANION GAP 10 4 - 13 mmol/L    BUN 22 5 - 25 mg/dL    Creatinine 1 14 0 60 - 1 30 mg/dL    Glucose 107 65 - 140 mg/dL    Calcium 9 4 8 3 - 10 1 mg/dL    AST 22 5 - 45 U/L    ALT 32 12 - 78 U/L    Alkaline Phosphatase 126 (H) 46 - 116 U/L    Total Protein 7 7 6 4 - 8 2 g/dL    Albumin 3 2 (L) 3 5 - 5 0 g/dL    Total Bilirubin 0 30 0 20 - 1 00 mg/dL    eGFR 55 ml/min/1 73sq m   Troponin I    Collection Time: 01/02/19  3:30 PM   Result Value Ref Range    Troponin I <0 02 <=0 04 ng/mL   TSH    Collection Time: 01/02/19 3:30 PM   Result Value Ref Range    TSH 3RD GENERATON 1 473 0 358 - 3 740 uIU/mL   T4, free    Collection Time: 01/02/19  3:30 PM   Result Value Ref Range    Free T4 0 98 0 76 - 1 46 ng/dL   Urinalysis with microscopic    Collection Time: 01/02/19  3:32 PM   Result Value Ref Range    Clarity, UA Clear     Color, UA Yellow     Specific Gravity, UA 1 025 1 003 - 1 030    pH, UA 5 5 4 5 - 8 0    Glucose, UA Negative Negative mg/dl    Ketones, UA Negative Negative mg/dl    Blood, UA Trace-lysed (A) Negative    Protein, UA Negative Negative mg/dl    Nitrite, UA Negative Negative    Bilirubin, UA Negative Negative    Urobilinogen, UA 0 2 0 2, 1 0 E U /dl E U /dl    Leukocytes, UA Small (A) Negative    WBC, UA 4-10 (A) None Seen, 0-5, 5-55, 5-65 /hpf    RBC, UA 0-1 (A) None Seen, 0-5 /hpf    Hyaline Casts, UA 0-1 (A) (none) /lpf    Bacteria, UA Occasional None Seen, Occasional /hpf    Epithelial Cells Occasional None Seen, Occasional /hpf       This note was created with voice recognition software  Phonic, grammatical and spelling errors may be present within the note as a result

## 2019-01-10 ENCOUNTER — OFFICE VISIT (OUTPATIENT)
Dept: INTERNAL MEDICINE CLINIC | Facility: CLINIC | Age: 74
End: 2019-01-10
Payer: MEDICARE

## 2019-01-10 VITALS
SYSTOLIC BLOOD PRESSURE: 142 MMHG | DIASTOLIC BLOOD PRESSURE: 90 MMHG | RESPIRATION RATE: 20 BRPM | OXYGEN SATURATION: 98 % | BODY MASS INDEX: 32.15 KG/M2 | HEIGHT: 65 IN | HEART RATE: 70 BPM | WEIGHT: 193 LBS

## 2019-01-10 DIAGNOSIS — R41.3 MEMORY LOSS: ICD-10-CM

## 2019-01-10 DIAGNOSIS — I10 HYPERTENSION, BENIGN: Primary | ICD-10-CM

## 2019-01-10 PROCEDURE — 99213 OFFICE O/P EST LOW 20 MIN: CPT | Performed by: INTERNAL MEDICINE

## 2019-01-10 RX ORDER — AMLODIPINE BESYLATE 5 MG/1
5 TABLET ORAL DAILY
Qty: 30 TABLET | Refills: 3 | Status: SHIPPED | OUTPATIENT
Start: 2019-01-10 | End: 2019-02-28 | Stop reason: SDUPTHER

## 2019-01-10 RX ORDER — QUINAPRIL HCL AND HYDROCHLOROTHIAZIDE 20; 25 MG/1; MG/1
1 TABLET ORAL DAILY
Qty: 30 TABLET | Refills: 3 | Status: SHIPPED | OUTPATIENT
Start: 2019-01-10 | End: 2019-03-12 | Stop reason: SDUPTHER

## 2019-01-10 NOTE — PROGRESS NOTES
Assessment/Plan:     Will add blood pressure medicine now that we know her machine is fairly accurate  Reinforced that she should take both medications  To make it easier, instead of taking the quinapril twice a day, I gave her the higher dose  Continue testing as planned  Continue baby aspirin  Follow-up with Neurology  Encouraged diet and exercise  For any worsening or change in symptoms, she will go back to the ER  BMI Counseling: Body mass index is 32 12 kg/m²  Discussed the patient's BMI with her  The BMI is above average  BMI counseling and education was provided to the patient  Nutrition recommendations include decreasing overall calorie intake  Return in about 2 weeks (around 1/24/2019)  No problem-specific Assessment & Plan notes found for this encounter  Diagnoses and all orders for this visit:    Hypertension, benign  -     quinapril-hydrochlorothiazide (ACCURETIC) 20-25 MG per tablet; Take 1 tablet by mouth daily  -     amLODIPine (NORVASC) 5 mg tablet; Take 1 tablet (5 mg total) by mouth daily for 60 days    Memory loss          Subjective:      Patient ID: Moses Colin is a 68 y o  female  Patient comes in today for follow-up of her pressure  She brought in her machine and we did check it  It appears to be accurate  She reports it is running in the 140s at home  She does have her testing scheduled  She does have her appointment with Neurology scheduled  She is taking the baby aspirin  She admits she still finds herself miss naming things  For example, she knows that the object is a pen but will call it something else  She knows it is wrong and then corrects herself and says the correct word  She states this has been happening since November          ALLERGIES:  Allergies   Allergen Reactions    Nuts Anaphylaxis     Raw Almonds only    Codeine Other (See Comments)     Dizzy, lightheaded       CURRENT MEDICATIONS:    Current Outpatient Prescriptions:     aspirin (ECOTRIN LOW STRENGTH) 81 mg EC tablet, Take 1 tablet (81 mg total) by mouth daily, Disp: , Rfl: 0    hyoscyamine (ANASPAZ,LEVSIN) 0 125 MG tablet, TAKE 1 TABLET 3-4 TIMES DAILY AS NEEDED , Disp: 60 tablet, Rfl: 2    levocetirizine (XYZAL) 5 MG tablet, Take 5 mg by mouth every evening, Disp: , Rfl:     lovastatin (MEVACOR) 10 MG tablet, Take 10 mg by mouth daily at bedtime, Disp: , Rfl:     metFORMIN (GLUCOPHAGE) 1000 MG tablet, TAKE 1 TABLET TWICE DAILY, Disp: 180 tablet, Rfl: 3    amLODIPine (NORVASC) 5 mg tablet, Take 1 tablet (5 mg total) by mouth daily for 60 days, Disp: 30 tablet, Rfl: 3    pantoprazole (PROTONIX) 40 mg tablet, Take 40 mg by mouth daily, Disp: , Rfl:     quinapril-hydrochlorothiazide (ACCURETIC) 20-25 MG per tablet, Take 1 tablet by mouth daily, Disp: 30 tablet, Rfl: 3    ACTIVE PROBLEM LIST:  Patient Active Problem List   Diagnosis    Allergic rhinitis    Hyperlipidemia    Hypertension, benign    Irritable bowel syndrome without diarrhea    Type 2 diabetes mellitus without complication, without long-term current use of insulin ([facility])    Screening for malignant neoplasm of breast    Abnormal mammogram    Breast pain, right    Paresthesia of both feet    Bilateral cold feet    Chronic bilateral low back pain    Memory loss       PAST MEDICAL HISTORY:  Past Medical History:   Diagnosis Date    Arthritis     Diabetes mellitus ([address] )     HNP (herniated nucleus pulposus), lumbar     Hyperlipidemia     Hypertension     IBS (irritable bowel syndrome)     Osteoarthritis        PAST SURGICAL HISTORY:  Past Surgical History:   Procedure Laterality Date    PARATHYROID GLAND SURGERY      resolved 05/12    SD ESOPHAGOGASTRODUODENOSCOPY TRANSORAL DIAGNOSTIC N/A 4/19/2017    Procedure: EGD AND COLONOSCOPY;  Surgeon: Stefan Meyers MD;  Location: [facility];   Service: Gastroenterology    UMBILICAL HERNIA REPAIR         FAMILY HISTORY:  Family History   Problem Relation Age of Onset    Breast cancer Mother     Arthritis Family     Hypertension Family        SOCIAL HISTORY:  Social History     Social History    Marital status: /Civil Union     Spouse name: N/A    Number of children: 2    Years of education: N/A     Occupational History    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     Social History Main Topics    Smoking status: Never Smoker    Smokeless tobacco: Never Used    Alcohol use No    Drug use: No    Sexual activity: Yes     Partners: Male     Other Topics Concern    Not on file     Social History Narrative    No narrative on file       Review of Systems   Respiratory: Negative for shortness of breath  Cardiovascular: Negative for chest pain  Gastrointestinal: Negative for abdominal pain  Objective:  Vitals:    01/10/19 1145   BP: 142/90   BP Location: Left arm   Patient Position: Sitting   Cuff Size: Adult   Pulse: 70   Resp: 20   SpO2: 98%   Weight: 87 5 kg (193 lb)   Height: 5' 5" (1 651 m)     Body mass index is 32 12 kg/m²  Physical Exam   Constitutional: She is oriented to person, place, and time  She appears well-developed and well-nourished  Cardiovascular: Normal rate, regular rhythm and normal heart sounds  Pulmonary/Chest: Effort normal and breath sounds normal    Abdominal: Soft  There is no tenderness  Neurological: She is alert and oriented to person, place, and time  Nursing note and vitals reviewed          RESULTS:    Recent Results (from the past 1008 hour(s))   ECG 12 lead    Collection Time: 01/02/19  3:26 PM   Result Value Ref Range    Ventricular Rate 61 BPM    Atrial Rate 61 BPM    [place] Interval 132 ms    QRSD Interval 90 ms    QT Interval 420 ms    QTC Interval 422 ms    P [place] 54 degrees    QRS Axis 64 degrees    T Wave Axis -13 degrees   CBC and differential    Collection Time: 01/02/19  3:30 PM   Result Value Ref Range    WBC 5 40 4 31 - 10 16 Thousand/uL    RBC 4 70 3 81 - 5 12 Million/uL    Hemoglobin 13 5 11 5 - 15 4 g/dL    Hematocrit 41 6 34 8 - 46 1 %    MCV 89 82 - 98 fL    MCH 28 7 26 8 - 34 3 pg    MCHC 32 5 31 4 - 37 4 g/dL    RDW 13 2 11 6 - 15 1 %    MPV 9 4 8 9 - 12 7 fL    Platelets 572 224 - 447 Thousands/uL    nRBC 0 /100 WBCs    Neutrophils Relative 51 43 - 75 %    Immat GRANS % 0 0 - 2 %    Lymphocytes Relative 32 14 - 44 %    Monocytes Relative 11 4 - 12 %    Eosinophils Relative 5 0 - 6 %    Basophils Relative 1 0 - 1 %    Neutrophils Absolute 2 76 1 85 - 7 62 Thousands/µL    Immature Grans Absolute 0 01 0 00 - 0 20 Thousand/uL    Lymphocytes Absolute 1 74 0 60 - 4 47 Thousands/µL    Monocytes Absolute 0 58 0 17 - 1 22 Thousand/µL    Eosinophils Absolute 0 28 0 00 - 0 61 Thousand/µL    Basophils Absolute 0 03 0 00 - 0 10 Thousands/µL   Comprehensive metabolic panel    Collection Time: 01/02/19  3:30 PM   Result Value Ref Range    Sodium 141 136 - 145 mmol/L    Potassium 3 8 3 5 - 5 3 mmol/L    Chloride 102 100 - 108 mmol/L    CO2 29 21 - 32 mmol/L    ANION GAP 10 4 - 13 mmol/L    BUN 22 5 - 25 mg/dL    Creatinine 1 14 0 60 - 1 30 mg/dL    Glucose 107 65 - 140 mg/dL    Calcium 9 4 8 3 - 10 1 mg/dL    AST 22 5 - 45 U/L    ALT 32 12 - 78 U/L    Alkaline Phosphatase 126 (H) 46 - 116 U/L    Total Protein 7 7 6 4 - 8 2 g/dL    Albumin 3 2 (L) 3 5 - 5 0 g/dL    Total Bilirubin 0 30 0 20 - 1 00 mg/dL    eGFR 55 ml/min/1 73sq m   Troponin I    Collection Time: 01/02/19  3:30 PM   Result Value Ref Range    Troponin I <0 02 <=0 04 ng/mL   TSH    Collection Time: 01/02/19  3:30 PM   Result Value Ref Range    TSH 3RD GENERATON 1 473 0 358 - 3 740 uIU/mL   T4, free    Collection Time: 01/02/19  3:30 PM   Result Value Ref Range    Free T4 0 98 0 76 - 1 46 ng/dL   Urinalysis with microscopic    Collection Time: 01/02/19  3:32 PM   Result Value Ref Range    Clarity, UA Clear     Color, UA Yellow     Specific Gravity, UA 1 025 1 003 - 1 030    pH, UA 5 5 4 5 - 8 0    Glucose, UA Negative Negative mg/dl Ketones, UA Negative Negative mg/dl    Blood, UA Trace-lysed (A) Negative    Protein, UA Negative Negative mg/dl    Nitrite, UA Negative Negative    Bilirubin, UA Negative Negative    Urobilinogen, UA 0 2 0 2, 1 0 E U /dl E U /dl    Leukocytes, UA Small (A) Negative    WBC, UA 4-10 (A) None Seen, 0-5, 5-55, 5-65 /hpf    RBC, UA 0-1 (A) None Seen, 0-5 /hpf    Hyaline Casts, UA 0-1 (A) (none) /lpf    Bacteria, UA Occasional None Seen, Occasional /hpf    Epithelial Cells Occasional None Seen, Occasional /hpf       This note was created with voice recognition software  Phonic, grammatical and spelling errors may be present within the note as a result

## 2019-01-16 ENCOUNTER — OFFICE VISIT (OUTPATIENT)
Dept: NEUROLOGY | Facility: CLINIC | Age: 74
End: 2019-01-16
Payer: MEDICARE

## 2019-01-16 VITALS
HEART RATE: 67 BPM | SYSTOLIC BLOOD PRESSURE: 118 MMHG | WEIGHT: 192.2 LBS | DIASTOLIC BLOOD PRESSURE: 78 MMHG | BODY MASS INDEX: 32.02 KG/M2 | HEIGHT: 65 IN

## 2019-01-16 DIAGNOSIS — R47.9 SPEECH DEFECT: Primary | ICD-10-CM

## 2019-01-16 DIAGNOSIS — G44.89 OTHER HEADACHE SYNDROME: ICD-10-CM

## 2019-01-16 DIAGNOSIS — R41.3 MEMORY LOSS: ICD-10-CM

## 2019-01-16 PROCEDURE — 99204 OFFICE O/P NEW MOD 45 MIN: CPT | Performed by: PSYCHIATRY & NEUROLOGY

## 2019-01-16 NOTE — LETTER
January 17, 2019     Rigo Garvin MD  [address]  [address] [place] [address]    Patient: Dacia Ojeda   YOB: 1945   Date of Visit: 1/16/2019     Dear Dr Lemuel Wynn      Thank you for referring Dacia Ojeda to me for evaluation  Below are the relevant portions of my assessment and plan of care  If you have questions, please do not hesitate to call me  I look forward to following Latoya Jean along with you           Sincerely,        Janee Pineda MD        CC: No Recipients    Progress Notes:

## 2019-01-16 NOTE — PROGRESS NOTES
Heather Simpson is a 68 y o  female presents with symptoms of headache and speech difficulty    Assessment:  1  Speech defect    2  Memory loss    3  Other headache syndrome        Plan:  MRI brain and carotid ultrasound have been scheduled  EEG  Blood work  Follow-up 6 weeks    Discussion:  José Antonio Chen reports 2 different types of speech disturbance  She had 1 episode where she could not understand what others were saying that was an isolated event about 6 months ago  She states that in addition she has had several episodes where she knows what she wants to say but ends up saying the wrong word  She recognizes that she said the wrong word and corrects it  She also feels that her memory is not quite as good as it should be  Her  has not noticed these changes but states that sometimes she has a little bit slow processing  She did score sub par on [place]  She has an MRI of her brain and carotid ultrasound pending  In addition will order EEG to rule out atypical seizure and blood work  She does have infrequent headaches lateralized to the right side without cervical issues  She does have a history of migraine and question if these are part of a migraine type complex  She does get relief with over-the-counter therapy and is not taking this frequently  I will see her back in follow-up on these are complete      Subjective:    HPI  José Antonio Chen is right-handed woman who presents with the above complaints  She states that 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  She states she was talking with 2 women and then suddenly realized that she could not understand anything that they were saying  She had an odd sensation come over her  She just walk away  Later she felt back to her normal self and asked them if they had noticed anything unusual and they indicated they did not  This is not happened again    She states over the last couple of months she has noticed that she knows what she wants to say but frequently the wrong word will come out  She wants to say shoe and she will say coat as an example  She states that she recognizes the mistake right away and corrects it without difficulty but this scares and frustrates her  She also feels that she is not remembering things as well as she should  She states that some will tell her something and then the conversation comes up again and she does remember anything about it  Her  who accompanied her today has not noticed any language difficulties of concern and has not noticed any major issues with memory  He states that sometimes it takes her a little longer to process things  She does things well on a day-to-day basis including handling the finances  Independent of the symptoms she reports a headache  She states about twice a week she gets a headache that begins in the cervical region and radiates up to the temple region on the right side  It is a pressure type pain that usually will last for a couple of hours  She does get relief if she takes Advil or Aleve  She states in the past she would have more severe headaches that would last for a couple of days  These were more of a throbbing type pain associated with photophobia, sonophobia and nausea    Her son gets migraine headaches      Past Medical History:   Diagnosis Date    Arthritis     Diabetes mellitus ([facility] [address] )     HNP (herniated nucleus pulposus), lumbar     Hyperlipidemia     Hypertension     IBS (irritable bowel syndrome)     Osteoarthritis        Family History:  Family History   Problem Relation Age of Onset    Breast cancer Mother     Heart defect Brother     Arthritis Family     Hypertension Family     No Known Problems Father     No Known Problems Brother        Past Surgical History:  Past Surgical History:   Procedure Laterality Date    PARATHYROID GLAND SURGERY      resolved 05/12    [place] ESOPHAGOGASTRODUODENOSCOPY TRANSORAL DIAGNOSTIC N/A 4/19/2017    Procedure: EGD AND COLONOSCOPY;  Surgeon: Sudeep Christensen MD;  Location: [facility]; Service: Gastroenterology    UMBILICAL HERNIA REPAIR         Social History:   reports that she has never smoked  She has never used smokeless tobacco  She reports that she does not drink alcohol or use drugs  Allergies:  Nuts and Codeine      Current Outpatient Prescriptions:     amLODIPine (NORVASC) 5 mg tablet, Take 1 tablet (5 mg total) by mouth daily for 60 days, Disp: 30 tablet, Rfl: 3    aspirin (ECOTRIN LOW STRENGTH) 81 mg EC tablet, Take 1 tablet (81 mg total) by mouth daily, Disp: , Rfl: 0    hyoscyamine (ANASPAZ,LEVSIN) 0 125 MG tablet, TAKE 1 TABLET 3-4 TIMES DAILY AS NEEDED , Disp: 60 tablet, Rfl: 2    levocetirizine (XYZAL) 5 MG tablet, Take 5 mg by mouth every evening, Disp: , Rfl:     lovastatin (MEVACOR) 10 MG tablet, Take 10 mg by mouth daily at bedtime, Disp: , Rfl:     metFORMIN (GLUCOPHAGE) 1000 MG tablet, TAKE 1 TABLET TWICE DAILY, Disp: 180 tablet, Rfl: 3    pantoprazole (PROTONIX) 40 mg tablet, Take 40 mg by mouth daily, Disp: , Rfl:     quinapril-hydrochlorothiazide (ACCURETIC) 20-25 MG per tablet, Take 1 tablet by mouth daily, Disp: 30 tablet, Rfl: 3    I have reviewed the past medical, social and family history, current medications, allergies, vitals, review of systems and updated this information as appropriate today     Objective:    Vitals:  Blood pressure 118/78, pulse 67, height 5' 5 25" (1 657 m), weight 87 2 kg (192 lb 3 2 oz)  Physical Exam    Neurological Exam    GENERAL:  Cooperative in no acute distress  Well-developed and well-nourished    HEAD and NECK   Head is atraumatic normocephalic with no lesions or masses  Neck is supple with full range of motion    CARDIOVASCULAR  Carotid Arteries-no carotid bruits  NEUROLOGIC:  Mental Status-the patient is awake alert, without aphasia or apraxia  She scored a 20/30 on [place]  Cranial Nerves: Visual fields are full to confrontation    Discs are flat  Extraocular movements are full without nystagmus  Pupils are 2-1/2 mm and reactive  Face is symmetrical to light touch  Movements of facial expression move symmetrically  Hearing is normal to finger rub bilaterally  Soft palate lifts symmetrically  Shoulder shrug is symmetrical  Tongue is midline without atrophy  Motor: No drift is noted on arm extension  Strength is full in the upper and lower extremities with normal bulk and tone  Sensory: Intact to temperature and vibratory sensation in the upper and lower extremities bilaterally  Cortical function is intact  Coordination: Finger to nose testing is performed accurately  Romberg is negative  Gait reveals a normal base with symmetrical arm swing  Tandem walk is performed with some difficulty  Reflexes:  1/4 and symmetrical in the biceps, triceps, brachioradialis, knee jerk and ankle jerk regions  Toes are downgoing              ROS:    Review of Systems   Constitutional: Positive for fatigue  Negative for appetite change and fever  HENT: Negative  Negative for hearing loss, tinnitus, trouble swallowing and voice change  Eyes: Negative  Negative for photophobia and pain  Respiratory: Negative  Negative for shortness of breath  Cardiovascular: Negative  Negative for palpitations  Gastrointestinal: Negative  Negative for nausea and vomiting  Endocrine: Negative  Negative for cold intolerance and heat intolerance  Genitourinary: Positive for frequency  Negative for dysuria and urgency  Musculoskeletal: Negative  Negative for myalgias and neck pain  Skin: Negative  Negative for rash  Neurological: Negative  Negative for dizziness, tremors, seizures, syncope, facial asymmetry, speech difficulty, weakness, light-headedness, numbness and headaches  Hematological: Negative  Does not bruise/bleed easily  Psychiatric/Behavioral: Negative  Negative for confusion, hallucinations and sleep disturbance          Memory difficulty All other systems reviewed and are negative

## 2019-01-18 ENCOUNTER — APPOINTMENT (OUTPATIENT)
Dept: LAB | Facility: CLINIC | Age: 74
End: 2019-01-18
Payer: MEDICARE

## 2019-01-18 DIAGNOSIS — R41.3 MEMORY LOSS: ICD-10-CM

## 2019-01-18 DIAGNOSIS — E11.9 TYPE 2 DIABETES MELLITUS WITHOUT COMPLICATION, WITHOUT LONG-TERM CURRENT USE OF INSULIN (HCC): ICD-10-CM

## 2019-01-18 LAB
ALBUMIN SERPL BCP-MCNC: 3.6 G/DL (ref 3.5–5)
ALP SERPL-CCNC: 121 U/L (ref 46–116)
ALT SERPL W P-5'-P-CCNC: 24 U/L (ref 12–78)
ANION GAP SERPL CALCULATED.3IONS-SCNC: 4 MMOL/L (ref 4–13)
AST SERPL W P-5'-P-CCNC: 17 U/L (ref 5–45)
BASOPHILS # BLD AUTO: 0.04 THOUSANDS/ΜL (ref 0–0.1)
BASOPHILS NFR BLD AUTO: 1 % (ref 0–1)
BILIRUB SERPL-MCNC: 0.51 MG/DL (ref 0.2–1)
BUN SERPL-MCNC: 15 MG/DL (ref 5–25)
CALCIUM SERPL-MCNC: 9.5 MG/DL (ref 8.3–10.1)
CHLORIDE SERPL-SCNC: 101 MMOL/L (ref 100–108)
CHOLEST SERPL-MCNC: 200 MG/DL (ref 50–200)
CO2 SERPL-SCNC: 29 MMOL/L (ref 21–32)
CREAT SERPL-MCNC: 1.18 MG/DL (ref 0.6–1.3)
EOSINOPHIL # BLD AUTO: 0.29 THOUSAND/ΜL (ref 0–0.61)
EOSINOPHIL NFR BLD AUTO: 7 % (ref 0–6)
ERYTHROCYTE [DISTWIDTH] IN BLOOD BY AUTOMATED COUNT: 13.3 % (ref 11.6–15.1)
ERYTHROCYTE [SEDIMENTATION RATE] IN BLOOD: 42 MM/HOUR (ref 0–20)
EST. AVERAGE GLUCOSE BLD GHB EST-MCNC: 177 MG/DL
FOLATE SERPL-MCNC: >20 NG/ML (ref 3.1–17.5)
GFR SERPL CREATININE-BSD FRML MDRD: 53 ML/MIN/1.73SQ M
GLUCOSE SERPL-MCNC: 159 MG/DL (ref 65–140)
HBA1C MFR BLD: 7.8 % (ref 4.2–6.3)
HCT VFR BLD AUTO: 44.1 % (ref 34.8–46.1)
HDLC SERPL-MCNC: 54 MG/DL (ref 40–60)
HGB BLD-MCNC: 13.8 G/DL (ref 11.5–15.4)
IMM GRANULOCYTES # BLD AUTO: 0.01 THOUSAND/UL (ref 0–0.2)
IMM GRANULOCYTES NFR BLD AUTO: 0 % (ref 0–2)
LDLC SERPL CALC-MCNC: 123 MG/DL (ref 0–100)
LYMPHOCYTES # BLD AUTO: 1.53 THOUSANDS/ΜL (ref 0.6–4.47)
LYMPHOCYTES NFR BLD AUTO: 37 % (ref 14–44)
MCH RBC QN AUTO: 28.5 PG (ref 26.8–34.3)
MCHC RBC AUTO-ENTMCNC: 31.3 G/DL (ref 31.4–37.4)
MCV RBC AUTO: 91 FL (ref 82–98)
MONOCYTES # BLD AUTO: 0.48 THOUSAND/ΜL (ref 0.17–1.22)
MONOCYTES NFR BLD AUTO: 12 % (ref 4–12)
NEUTROPHILS # BLD AUTO: 1.82 THOUSANDS/ΜL (ref 1.85–7.62)
NEUTS SEG NFR BLD AUTO: 43 % (ref 43–75)
NONHDLC SERPL-MCNC: 146 MG/DL
NRBC BLD AUTO-RTO: 0 /100 WBCS
PLATELET # BLD AUTO: 286 THOUSANDS/UL (ref 149–390)
PMV BLD AUTO: 10.5 FL (ref 8.9–12.7)
POTASSIUM SERPL-SCNC: 4 MMOL/L (ref 3.5–5.3)
PROT SERPL-MCNC: 8.7 G/DL (ref 6.4–8.2)
RBC # BLD AUTO: 4.85 MILLION/UL (ref 3.81–5.12)
SODIUM SERPL-SCNC: 134 MMOL/L (ref 136–145)
TRIGL SERPL-MCNC: 117 MG/DL
VIT B12 SERPL-MCNC: 731 PG/ML (ref 100–900)
WBC # BLD AUTO: 4.17 THOUSAND/UL (ref 4.31–10.16)

## 2019-01-18 PROCEDURE — 36415 COLL VENOUS BLD VENIPUNCTURE: CPT

## 2019-01-18 PROCEDURE — 83036 HEMOGLOBIN GLYCOSYLATED A1C: CPT

## 2019-01-18 PROCEDURE — 86038 ANTINUCLEAR ANTIBODIES: CPT

## 2019-01-18 PROCEDURE — 85025 COMPLETE CBC W/AUTO DIFF WBC: CPT

## 2019-01-18 PROCEDURE — 80053 COMPREHEN METABOLIC PANEL: CPT

## 2019-01-18 PROCEDURE — 86618 LYME DISEASE ANTIBODY: CPT

## 2019-01-18 PROCEDURE — 85652 RBC SED RATE AUTOMATED: CPT

## 2019-01-18 PROCEDURE — 80061 LIPID PANEL: CPT

## 2019-01-18 PROCEDURE — 82607 VITAMIN B-12: CPT

## 2019-01-18 PROCEDURE — 86592 SYPHILIS TEST NON-TREP QUAL: CPT

## 2019-01-18 PROCEDURE — 82746 ASSAY OF FOLIC ACID SERUM: CPT

## 2019-01-20 LAB
B BURGDOR IGG SER IA-ACNC: 0.26
B BURGDOR IGM SER IA-ACNC: 0.22

## 2019-01-21 LAB
RPR SER QL: NORMAL
RYE IGE QN: NEGATIVE

## 2019-01-22 RX ORDER — QUINAPRIL HCL AND HYDROCHLOROTHIAZIDE 10; 12.5 MG/1; MG/1
TABLET ORAL
Qty: 30 TABLET | Refills: 5 | OUTPATIENT
Start: 2019-01-22

## 2019-01-28 ENCOUNTER — HOSPITAL ENCOUNTER (OUTPATIENT)
Dept: NON INVASIVE DIAGNOSTICS | Facility: CLINIC | Age: 74
Discharge: HOME | End: 2019-01-28
Payer: MEDICARE

## 2019-01-28 ENCOUNTER — HOSPITAL ENCOUNTER (OUTPATIENT)
Dept: MRI IMAGING | Facility: CLINIC | Age: 74
Discharge: HOME | End: 2019-01-28
Payer: MEDICARE

## 2019-01-28 DIAGNOSIS — R51.9 NONINTRACTABLE EPISODIC HEADACHE, UNSPECIFIED HEADACHE TYPE: ICD-10-CM

## 2019-01-28 DIAGNOSIS — R47.81 SLURRED SPEECH: ICD-10-CM

## 2019-01-28 PROCEDURE — 70553 MRI BRAIN STEM W/O & W/DYE: CPT

## 2019-01-28 PROCEDURE — 93880 EXTRACRANIAL BILAT STUDY: CPT | Performed by: SURGERY

## 2019-01-28 PROCEDURE — A9585 GADOBUTROL INJECTION: HCPCS | Performed by: INTERNAL MEDICINE

## 2019-01-28 PROCEDURE — 93306 TTE W/DOPPLER COMPLETE: CPT

## 2019-01-28 PROCEDURE — 93306 TTE W/DOPPLER COMPLETE: CPT | Performed by: INTERNAL MEDICINE

## 2019-01-28 PROCEDURE — 93880 EXTRACRANIAL BILAT STUDY: CPT

## 2019-01-28 RX ADMIN — GADOBUTROL 9 ML: 604.72 INJECTION INTRAVENOUS at 15:32

## 2019-02-07 ENCOUNTER — TELEPHONE (OUTPATIENT)
Dept: INTERNAL MEDICINE CLINIC | Facility: CLINIC | Age: 74
End: 2019-02-07

## 2019-02-07 ENCOUNTER — OFFICE VISIT (OUTPATIENT)
Dept: INTERNAL MEDICINE CLINIC | Facility: CLINIC | Age: 74
End: 2019-02-07
Payer: MEDICARE

## 2019-02-07 VITALS
DIASTOLIC BLOOD PRESSURE: 72 MMHG | HEIGHT: 65 IN | SYSTOLIC BLOOD PRESSURE: 114 MMHG | HEART RATE: 78 BPM | RESPIRATION RATE: 18 BRPM | BODY MASS INDEX: 31.82 KG/M2 | OXYGEN SATURATION: 95 % | WEIGHT: 191 LBS

## 2019-02-07 DIAGNOSIS — I10 HYPERTENSION, BENIGN: ICD-10-CM

## 2019-02-07 DIAGNOSIS — E11.9 TYPE 2 DIABETES MELLITUS WITHOUT COMPLICATION, WITHOUT LONG-TERM CURRENT USE OF INSULIN (HCC): Primary | ICD-10-CM

## 2019-02-07 DIAGNOSIS — E78.2 MIXED HYPERLIPIDEMIA: ICD-10-CM

## 2019-02-07 DIAGNOSIS — R47.9 SPEECH DEFECT: ICD-10-CM

## 2019-02-07 PROCEDURE — 99214 OFFICE O/P EST MOD 30 MIN: CPT | Performed by: INTERNAL MEDICINE

## 2019-02-07 NOTE — TELEPHONE ENCOUNTER
Patient was to call back with the name of the meter that her and her  are using:  [employer]u [employer]k Guide    She need the strips ordered for this machine

## 2019-02-07 NOTE — PROGRESS NOTES
Assessment/Plan:     She needs to get back on track with her diet  Continue current medications  Encouraged exercise for her and her   No other changes at this point  Follow up with Neurology as planned  BMI Counseling: Body mass index is 31 54 kg/m²  Discussed the patient's BMI with her  The BMI is above average  BMI counseling and education was provided to the patient  Exercise recommendations include exercising 3-5 times per week  Return in about 3 months (around 5/7/2019) for Regular visit and Medicare Wellness  No problem-specific Assessment & Plan notes found for this encounter  Diagnoses and all orders for this visit:    Type 2 diabetes mellitus without complication, without long-term current use of insulin (HCC)  -     CBC and differential; Future  -     Comprehensive metabolic panel; Future  -     Hemoglobin A1C; Future  -     Lipid panel; Future  -     Microalbumin / creatinine urine ratio; Future    Hypertension, benign    Mixed hyperlipidemia    Speech defect          Subjective:      Patient ID: Hayder Curran is a 68 y o  female  Patient comes in today for follow-up with her   Her blood work shows her sugars went up  Probably from the holidays  She has been taking her medicine as directed  Her cholesterol went up a little as well  Not too bad  Blood pressure is controlled  Her neurologic workup so far has been negative  She did see Neurology and has follow-up with them          ALLERGIES:  Allergies   Allergen Reactions    Nuts Anaphylaxis     Raw Almonds only    Codeine Other (See Comments)     Dizzy, lightheaded       CURRENT MEDICATIONS:    Current Outpatient Prescriptions:     amLODIPine (NORVASC) 5 mg tablet, Take 1 tablet (5 mg total) by mouth daily for 60 days, Disp: 30 tablet, Rfl: 3    aspirin (ECOTRIN LOW STRENGTH) 81 mg EC tablet, Take 1 tablet (81 mg total) by mouth daily, Disp: , Rfl: 0    hyoscyamine (ANASPAZ,LEVSIN) 0 125 MG tablet, TAKE 1 TABLET 3-4 TIMES DAILY AS NEEDED , Disp: 60 tablet, Rfl: 2    levocetirizine (XYZAL) 5 MG tablet, Take 5 mg by mouth every evening, Disp: , Rfl:     lovastatin (MEVACOR) 10 MG tablet, Take 10 mg by mouth daily at bedtime, Disp: , Rfl:     metFORMIN (GLUCOPHAGE) 1000 MG tablet, TAKE 1 TABLET TWICE DAILY, Disp: 180 tablet, Rfl: 3    pantoprazole (PROTONIX) 40 mg tablet, Take 40 mg by mouth daily, Disp: , Rfl:     quinapril-hydrochlorothiazide (ACCURETIC) 20-25 MG per tablet, Take 1 tablet by mouth daily, Disp: 30 tablet, Rfl: 3    ACTIVE PROBLEM LIST:  Patient Active Problem List   Diagnosis    Allergic rhinitis    Hyperlipidemia    Hypertension, benign    Irritable bowel syndrome without diarrhea    Type 2 diabetes mellitus without complication, without long-term current use of insulin ([facility])    Screening for malignant neoplasm of breast    Abnormal mammogram    Breast pain, right    Paresthesia of both feet    Bilateral cold feet    Chronic bilateral low back pain    Memory loss    Speech defect    Other headache syndrome       PAST MEDICAL HISTORY:  Past Medical History:   Diagnosis Date    Arthritis     Diabetes mellitus ([address] )     HNP (herniated nucleus pulposus), lumbar     Hyperlipidemia     Hypertension     IBS (irritable bowel syndrome)     Osteoarthritis        PAST SURGICAL HISTORY:  Past Surgical History:   Procedure Laterality Date    PARATHYROID GLAND SURGERY      resolved 05/12    [place] ESOPHAGOGASTRODUODENOSCOPY TRANSORAL DIAGNOSTIC N/A 4/19/2017    Procedure: EGD AND COLONOSCOPY;  Surgeon: Leatha Porter MD;  Location: [facility];   Service: Gastroenterology    UMBILICAL HERNIA REPAIR         FAMILY HISTORY:  Family History   Problem Relation Age of Onset    Breast cancer Mother     Heart defect Brother     Arthritis Family     Hypertension Family     No Known Problems Father     No Known Problems Brother        SOCIAL HISTORY:  Social History     Social History  Marital status: /Civil Union     Spouse name: N/A    Number of children: 2    Years of education: N/A     Occupational History    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     Social History Main Topics    Smoking status: Never Smoker    Smokeless tobacco: Never Used    Alcohol use No    Drug use: No    Sexual activity: Yes     Partners: Male     Other Topics Concern    Not on file     Social History Narrative    No narrative on file       Review of Systems   Respiratory: Negative for shortness of breath  Cardiovascular: Negative for chest pain  Gastrointestinal: Negative for abdominal pain  Objective:  Vitals:    02/07/19 1049   BP: 114/72   BP Location: Left arm   Patient Position: Sitting   Cuff Size: Large   Pulse: 78   Resp: 18   SpO2: 95%   Weight: 86 6 kg (191 lb)   Height: 5' 5 25" (1 657 m)     Body mass index is 31 54 kg/m²  Physical Exam   Constitutional: She is oriented to person, place, and time  She appears well-developed and well-nourished  Cardiovascular: Normal rate, regular rhythm and normal heart sounds  Pulmonary/Chest: Effort normal and breath sounds normal    Abdominal: Soft  There is no tenderness  Neurological: She is alert and oriented to person, place, and time  Nursing note and vitals reviewed          RESULTS:    Recent Results (from the past 1008 hour(s))   ECG 12 lead    Collection Time: 01/02/19  3:26 PM   Result Value Ref Range    Ventricular Rate 61 BPM    Atrial Rate 61 BPM    IN Interval 132 ms    QRSD Interval 90 ms    QT Interval 420 ms    QTC Interval 422 ms    P [place] 54 degrees    QRS Axis 64 degrees    T Wave Axis -13 degrees   CBC and differential    Collection Time: 01/02/19  3:30 PM   Result Value Ref Range    WBC 5 40 4 31 - 10 16 Thousand/uL    RBC 4 70 3 81 - 5 12 Million/uL    Hemoglobin 13 5 11 5 - 15 4 g/dL    Hematocrit 41 6 34 8 - 46 1 %    MCV 89 82 - 98 fL    MCH 28 7 26 8 - 34 3 pg    MCHC 32 5 31 4 - 37 4 g/dL RDW 13 2 11 6 - 15 1 %    MPV 9 4 8 9 - 12 7 fL    Platelets 450 235 - 794 Thousands/uL    nRBC 0 /100 WBCs    Neutrophils Relative 51 43 - 75 %    Immat GRANS % 0 0 - 2 %    Lymphocytes Relative 32 14 - 44 %    Monocytes Relative 11 4 - 12 %    Eosinophils Relative 5 0 - 6 %    Basophils Relative 1 0 - 1 %    Neutrophils Absolute 2 76 1 85 - 7 62 Thousands/µL    Immature Grans Absolute 0 01 0 00 - 0 20 Thousand/uL    Lymphocytes Absolute 1 74 0 60 - 4 47 Thousands/µL    Monocytes Absolute 0 58 0 17 - 1 22 Thousand/µL    Eosinophils Absolute 0 28 0 00 - 0 61 Thousand/µL    Basophils Absolute 0 03 0 00 - 0 10 Thousands/µL   Comprehensive metabolic panel    Collection Time: 01/02/19  3:30 PM   Result Value Ref Range    Sodium 141 136 - 145 mmol/L    Potassium 3 8 3 5 - 5 3 mmol/L    Chloride 102 100 - 108 mmol/L    CO2 29 21 - 32 mmol/L    ANION GAP 10 4 - 13 mmol/L    BUN 22 5 - 25 mg/dL    Creatinine 1 14 0 60 - 1 30 mg/dL    Glucose 107 65 - 140 mg/dL    Calcium 9 4 8 3 - 10 1 mg/dL    AST 22 5 - 45 U/L    ALT 32 12 - 78 U/L    Alkaline Phosphatase 126 (H) 46 - 116 U/L    Total Protein 7 7 6 4 - 8 2 g/dL    Albumin 3 2 (L) 3 5 - 5 0 g/dL    Total Bilirubin 0 30 0 20 - 1 00 mg/dL    eGFR 55 ml/min/1 73sq m   Troponin I    Collection Time: 01/02/19  3:30 PM   Result Value Ref Range    Troponin I <0 02 <=0 04 ng/mL   TSH    Collection Time: 01/02/19  3:30 PM   Result Value Ref Range    TSH 3RD GENERATON 1 473 0 358 - 3 740 uIU/mL   T4, free    Collection Time: 01/02/19  3:30 PM   Result Value Ref Range    Free T4 0 98 0 76 - 1 46 ng/dL   Urinalysis with microscopic    Collection Time: 01/02/19  3:32 PM   Result Value Ref Range    Clarity, UA Clear     Color, UA Yellow     Specific Gravity, UA 1 025 1 003 - 1 030    pH, UA 5 5 4 5 - 8 0    Glucose, UA Negative Negative mg/dl    Ketones, UA Negative Negative mg/dl    Blood, UA Trace-lysed (A) Negative    Protein, UA Negative Negative mg/dl    Nitrite, UA Negative Negative    Bilirubin, UA Negative Negative    Urobilinogen, UA 0 2 0 2, 1 0 E U /dl E U /dl    Leukocytes, UA Small (A) Negative    WBC, UA 4-10 (A) None Seen, 0-5, 5-55, 5-65 /hpf    RBC, UA 0-1 (A) None Seen, 0-5 /hpf    Hyaline Casts, UA 0-1 (A) (none) /lpf    Bacteria, UA Occasional None Seen, Occasional /hpf    Epithelial Cells Occasional None Seen, Occasional /hpf   CBC and differential    Collection Time: 01/18/19 11:49 AM   Result Value Ref Range    WBC 4 17 (L) 4 31 - 10 16 Thousand/uL    RBC 4 85 3 81 - 5 12 Million/uL    Hemoglobin 13 8 11 5 - 15 4 g/dL    Hematocrit 44 1 34 8 - 46 1 %    MCV 91 82 - 98 fL    MCH 28 5 26 8 - 34 3 pg    MCHC 31 3 (L) 31 4 - 37 4 g/dL    RDW 13 3 11 6 - 15 1 %    MPV 10 5 8 9 - 12 7 fL    Platelets 235 320 - 208 Thousands/uL    nRBC 0 /100 WBCs    Neutrophils Relative 43 43 - 75 %    Immat GRANS % 0 0 - 2 %    Lymphocytes Relative 37 14 - 44 %    Monocytes Relative 12 4 - 12 %    Eosinophils Relative 7 (H) 0 - 6 %    Basophils Relative 1 0 - 1 %    Neutrophils Absolute 1 82 (L) 1 85 - 7 62 Thousands/µL    Immature Grans Absolute 0 01 0 00 - 0 20 Thousand/uL    Lymphocytes Absolute 1 53 0 60 - 4 47 Thousands/µL    Monocytes Absolute 0 48 0 17 - 1 22 Thousand/µL    Eosinophils Absolute 0 29 0 00 - 0 61 Thousand/µL    Basophils Absolute 0 04 0 00 - 0 10 Thousands/µL   Comprehensive metabolic panel    Collection Time: 01/18/19 11:49 AM   Result Value Ref Range    Sodium 134 (L) 136 - 145 mmol/L    Potassium 4 0 3 5 - 5 3 mmol/L    Chloride 101 100 - 108 mmol/L    CO2 29 21 - 32 mmol/L    ANION GAP 4 4 - 13 mmol/L    BUN 15 5 - 25 mg/dL    Creatinine 1 18 0 60 - 1 30 mg/dL    Glucose 159 (H) 65 - 140 mg/dL    Calcium 9 5 8 3 - 10 1 mg/dL    AST 17 5 - 45 U/L    ALT 24 12 - 78 U/L    Alkaline Phosphatase 121 (H) 46 - 116 U/L    Total Protein 8 7 (H) 6 4 - 8 2 g/dL    Albumin 3 6 3 5 - 5 0 g/dL    Total Bilirubin 0 51 0 20 - 1 00 mg/dL    eGFR 53 ml/min/1 73sq m   Lipid panel Collection Time: 01/18/19 11:49 AM   Result Value Ref Range    Cholesterol 200 50 - 200 mg/dL    Triglycerides 117 <=150 mg/dL    HDL, Direct 54 40 - 60 mg/dL    LDL Calculated 123 (H) 0 - 100 mg/dL    Non-HDL-Chol (CHOL-HDL) 146 mg/dl   Hemoglobin A1C    Collection Time: 01/18/19 11:49 AM   Result Value Ref Range    Hemoglobin A1C 7 8 (H) 4 2 - 6 3 %     mg/dl   RPR    Collection Time: 01/18/19 11:49 AM   Result Value Ref Range    RPR Non-Reactive Non-Reactive   Lyme Antibody Profile with reflex to WB    Collection Time: 01/18/19 11:49 AM   Result Value Ref Range    LYME AB IGG 0 26 0 00 - 0 79    LYME AB IGM 0 22 0 00 - 0 79   Folate    Collection Time: 01/18/19 11:49 AM   Result Value Ref Range    Folate >20 0 (H) 3 1 - 17 5 ng/mL   SANA Screen w/ Reflex to Titer/Pattern    Collection Time: 01/18/19 11:49 AM   Result Value Ref Range    SNAA Negative Negative   Vitamin B12    Collection Time: 01/18/19 11:49 AM   Result Value Ref Range    Vitamin B-12 731 100 - 900 pg/mL   Sedimentation rate, automated    Collection Time: 01/18/19 11:49 AM   Result Value Ref Range    Sed Rate 42 (H) 0 - 20 mm/hour       This note was created with voice recognition software  Phonic, grammatical and spelling errors may be present within the note as a result

## 2019-02-14 DIAGNOSIS — E11.9 TYPE 2 DIABETES MELLITUS WITHOUT COMPLICATION, WITHOUT LONG-TERM CURRENT USE OF INSULIN (HCC): ICD-10-CM

## 2019-02-21 DIAGNOSIS — E78.2 MIXED HYPERLIPIDEMIA: Primary | ICD-10-CM

## 2019-02-21 RX ORDER — LOVASTATIN 10 MG/1
TABLET ORAL
Qty: 90 TABLET | Refills: 1 | Status: SHIPPED | OUTPATIENT
Start: 2019-02-21 | End: 2019-08-29 | Stop reason: SDUPTHER

## 2019-02-26 ENCOUNTER — HOSPITAL ENCOUNTER (OUTPATIENT)
Dept: NEUROLOGY | Facility: HOSPITAL | Age: 74
Discharge: HOME | End: 2019-02-26
Attending: PSYCHIATRY & NEUROLOGY
Payer: MEDICARE

## 2019-02-26 DIAGNOSIS — R47.9 SPEECH DEFECT: ICD-10-CM

## 2019-02-26 DIAGNOSIS — R41.3 MEMORY LOSS: ICD-10-CM

## 2019-02-26 PROCEDURE — 95816 EEG AWAKE AND DROWSY: CPT

## 2019-02-26 PROCEDURE — 95816 EEG AWAKE AND DROWSY: CPT | Performed by: PSYCHIATRY & NEUROLOGY

## 2019-02-28 DIAGNOSIS — I10 HYPERTENSION, BENIGN: ICD-10-CM

## 2019-03-01 RX ORDER — AMLODIPINE BESYLATE 5 MG/1
5 TABLET ORAL DAILY
Qty: 90 TABLET | Refills: 1 | Status: SHIPPED | OUTPATIENT
Start: 2019-03-01 | End: 2019-09-23 | Stop reason: SDUPTHER

## 2019-03-06 ENCOUNTER — TELEPHONE (OUTPATIENT)
Dept: NEUROLOGY | Facility: CLINIC | Age: 74
End: 2019-03-06

## 2019-03-06 NOTE — TELEPHONE ENCOUNTER
Called patient from wait list , have a cancellation appt available for today at 1:20   With Dr Barrett Butler

## 2019-03-11 ENCOUNTER — OFFICE VISIT (OUTPATIENT)
Dept: NEUROLOGY | Facility: CLINIC | Age: 74
End: 2019-03-11
Payer: MEDICARE

## 2019-03-11 VITALS
HEIGHT: 65 IN | DIASTOLIC BLOOD PRESSURE: 74 MMHG | SYSTOLIC BLOOD PRESSURE: 114 MMHG | BODY MASS INDEX: 31.96 KG/M2 | WEIGHT: 191.8 LBS | HEART RATE: 77 BPM

## 2019-03-11 DIAGNOSIS — R47.9 SPEECH DEFECT: Primary | ICD-10-CM

## 2019-03-11 DIAGNOSIS — G44.89 OTHER HEADACHE SYNDROME: ICD-10-CM

## 2019-03-11 DIAGNOSIS — R41.3 MEMORY LOSS: ICD-10-CM

## 2019-03-11 PROCEDURE — 99213 OFFICE O/P EST LOW 20 MIN: CPT | Performed by: PSYCHIATRY & NEUROLOGY

## 2019-03-11 NOTE — PROGRESS NOTES
Angelica Silva is a 68 y o  female presents with memory difficulty, speech disturbance and headache    Assessment:  1  Speech defect    2  Memory loss    3  Other headache syndrome        Plan:  Follow-up 4 months    Discussion:  Neither Marito Jimenez nor her  have noticed any decline since here last, in fact both feel that her memory is doing better  She is occasionally miss use the wrong word but is aware when this occurs  She states her headache symptoms have improved both in frequency and severity, in fact states she has only had 1 headache since here last   Her workup was negative for significant abnormalities  No obvious head tremor is noted on today's exam   Anticipate re-evaluating her again in 4 months      Subjective:    HPI  Marito Jimenez returns in follow-up today accompanied by her   Both she and her  report that her memory symptoms have improved  She occasionally finds that she uses the wrong word for things but this has not gotten worse  She continues to handle finances without difficulty  She states that she has only had 1 headache since here last   Her  reports that occasionally she has a tremor in her head especially when talking watching television however this is not been obvious during our exam   MRI of the brain demonstrated mild microangiopathic ischemic change, EEG demonstrated by sylvian slowing which is nonspecific  Carotid ultrasound demonstrated no stenosis on the right and less than 50% stenosis on the left secondary to smooth plaque    Blood work was unremarkable      Past Medical History:   Diagnosis Date    Arthritis     Diabetes mellitus ([address] )     HNP (herniated nucleus pulposus), lumbar     Hyperlipidemia     Hypertension     IBS (irritable bowel syndrome)     Osteoarthritis        Family History:  Family History   Problem Relation Age of Onset    Breast cancer Mother     Heart defect Brother     Arthritis Family     Hypertension Family     No Known Problems Father     No Known Problems Brother        Past Surgical History:  Past Surgical History:   Procedure Laterality Date    PARATHYROID GLAND SURGERY      resolved 05/12    [place] ESOPHAGOGASTRODUODENOSCOPY TRANSORAL DIAGNOSTIC N/A 4/19/2017    Procedure: EGD AND COLONOSCOPY;  Surgeon: Acosta Santiago MD;  Location: [facility]; Service: Gastroenterology    UMBILICAL HERNIA REPAIR         Social History:   reports that she has never smoked  She has never used smokeless tobacco  She reports that she does not drink alcohol or use drugs  Allergies:  Nuts and Codeine      Current Outpatient Medications:     amLODIPine (NORVASC) 5 mg tablet, Take 1 tablet (5 mg total) by mouth daily for 60 days, Disp: 90 tablet, Rfl: 1    aspirin (ECOTRIN LOW STRENGTH) 81 mg EC tablet, Take 1 tablet (81 mg total) by mouth daily, Disp: , Rfl: 0    glucose blood (ACCU-CHEK GUIDE) test strip, Check sugar daily, Disp: 50 each, Rfl: 5    hyoscyamine (ANASPAZ,LEVSIN) 0 125 MG tablet, TAKE 1 TABLET 3-4 TIMES DAILY AS NEEDED , Disp: 60 tablet, Rfl: 2    levocetirizine (XYZAL) 5 MG tablet, Take 5 mg by mouth as needed , Disp: , Rfl:     lovastatin (MEVACOR) 10 MG tablet, TAKE 1 TABLET BY MOUTH EVERYDAY AT BEDTIME, Disp: 90 tablet, Rfl: 1    metFORMIN (GLUCOPHAGE) 1000 MG tablet, TAKE 1 TABLET TWICE DAILY, Disp: 180 tablet, Rfl: 2    quinapril-hydrochlorothiazide (ACCURETIC) 20-25 MG per tablet, Take 1 tablet by mouth daily, Disp: 30 tablet, Rfl: 3    I have reviewed the past medical, social and family history, current medications, allergies, vitals, review of systems and updated this information as appropriate today     Objective:    Vitals:  Blood pressure 114/74, pulse 77, height 5' 5 25" (1 657 m), weight 87 kg (191 lb 12 8 oz)      Physical Exam    Neurological Exam  GENERAL:  Well-developed well-nourished woman in no acute distress  HEENT/NECK: Head is atraumatic normocephalic, neck is supple  NEUROLOGIC:  Mental Status: Awake and alert without aphasia  Cranial Nerves: Extraocular movements are full  Face is symmetrical  Motor:  No drift is noted on arm extension  Coordination:  Finger-to-nose testing is performed accurately  Romberg is negative  Gait is stable              ROS:    Review of Systems   Constitutional: Negative  Negative for appetite change and fever  HENT: Negative  Negative for hearing loss, tinnitus, trouble swallowing and voice change  Eyes: Positive for visual disturbance (blurred)  Negative for photophobia and pain  Respiratory: Negative  Negative for shortness of breath  Cardiovascular: Negative  Negative for palpitations  Gastrointestinal: Negative  Negative for nausea and vomiting  Endocrine: Negative  Negative for cold intolerance and heat intolerance  Genitourinary: Negative  Negative for dysuria, frequency and urgency  Musculoskeletal: Positive for back pain  Negative for myalgias and neck pain  Skin: Negative  Negative for rash  Neurological: Negative  Negative for dizziness, tremors, seizures, syncope, facial asymmetry, speech difficulty, weakness, light-headedness, numbness and headaches  Hematological: Negative  Does not bruise/bleed easily  Psychiatric/Behavioral: Negative  Negative for confusion, hallucinations and sleep disturbance  All other systems reviewed and are negative

## 2019-03-12 DIAGNOSIS — I10 HYPERTENSION, BENIGN: ICD-10-CM

## 2019-03-12 RX ORDER — QUINAPRIL HCL AND HYDROCHLOROTHIAZIDE 20; 25 MG/1; MG/1
1 TABLET ORAL DAILY
Qty: 30 TABLET | Refills: 5 | Status: SHIPPED | OUTPATIENT
Start: 2019-03-12 | End: 2019-03-18 | Stop reason: SDUPTHER

## 2019-03-18 DIAGNOSIS — I10 HYPERTENSION, BENIGN: ICD-10-CM

## 2019-03-18 RX ORDER — QUINAPRIL HCL AND HYDROCHLOROTHIAZIDE 20; 25 MG/1; MG/1
1 TABLET ORAL DAILY
Qty: 90 TABLET | Refills: 1 | Status: SHIPPED | OUTPATIENT
Start: 2019-03-18 | End: 2020-03-30

## 2019-04-18 ENCOUNTER — TELEPHONE (OUTPATIENT)
Dept: INTERNAL MEDICINE CLINIC | Facility: CLINIC | Age: 74
End: 2019-04-18

## 2019-04-18 DIAGNOSIS — E11.9 TYPE 2 DIABETES MELLITUS WITHOUT COMPLICATION, WITHOUT LONG-TERM CURRENT USE OF INSULIN (HCC): Primary | ICD-10-CM

## 2019-05-11 DIAGNOSIS — R92.8 ABNORMAL MAMMOGRAM: ICD-10-CM

## 2019-05-13 ENCOUNTER — APPOINTMENT (OUTPATIENT)
Dept: LAB | Facility: CLINIC | Age: 74
End: 2019-05-13
Payer: MEDICARE

## 2019-05-13 DIAGNOSIS — E11.9 TYPE 2 DIABETES MELLITUS WITHOUT COMPLICATION, WITHOUT LONG-TERM CURRENT USE OF INSULIN (HCC): ICD-10-CM

## 2019-05-13 DIAGNOSIS — E11.9 TYPE 2 DIABETES MELLITUS WITHOUT COMPLICATION, WITHOUT LONG-TERM CURRENT USE OF INSULIN (HCC): Primary | ICD-10-CM

## 2019-05-13 LAB
ALBUMIN SERPL BCP-MCNC: 3.4 G/DL (ref 3.5–5)
ALP SERPL-CCNC: 130 U/L (ref 46–116)
ALT SERPL W P-5'-P-CCNC: 24 U/L (ref 12–78)
ANION GAP SERPL CALCULATED.3IONS-SCNC: 3 MMOL/L (ref 4–13)
AST SERPL W P-5'-P-CCNC: 17 U/L (ref 5–45)
BASOPHILS # BLD AUTO: 0.04 THOUSANDS/ΜL (ref 0–0.1)
BASOPHILS NFR BLD AUTO: 1 % (ref 0–1)
BILIRUB SERPL-MCNC: 0.24 MG/DL (ref 0.2–1)
BUN SERPL-MCNC: 16 MG/DL (ref 5–25)
CALCIUM SERPL-MCNC: 9 MG/DL (ref 8.3–10.1)
CHLORIDE SERPL-SCNC: 106 MMOL/L (ref 100–108)
CHOLEST SERPL-MCNC: 188 MG/DL (ref 50–200)
CO2 SERPL-SCNC: 28 MMOL/L (ref 21–32)
CREAT SERPL-MCNC: 1.13 MG/DL (ref 0.6–1.3)
CREAT UR-MCNC: 91.2 MG/DL
EOSINOPHIL # BLD AUTO: 0.22 THOUSAND/ΜL (ref 0–0.61)
EOSINOPHIL NFR BLD AUTO: 6 % (ref 0–6)
ERYTHROCYTE [DISTWIDTH] IN BLOOD BY AUTOMATED COUNT: 13.6 % (ref 11.6–15.1)
EST. AVERAGE GLUCOSE BLD GHB EST-MCNC: 180 MG/DL
GFR SERPL CREATININE-BSD FRML MDRD: 56 ML/MIN/1.73SQ M
GLUCOSE P FAST SERPL-MCNC: 164 MG/DL (ref 65–99)
HBA1C MFR BLD: 7.9 % (ref 4.2–6.3)
HCT VFR BLD AUTO: 41.6 % (ref 34.8–46.1)
HDLC SERPL-MCNC: 54 MG/DL (ref 40–60)
HGB BLD-MCNC: 13.2 G/DL (ref 11.5–15.4)
IMM GRANULOCYTES # BLD AUTO: 0 THOUSAND/UL (ref 0–0.2)
IMM GRANULOCYTES NFR BLD AUTO: 0 % (ref 0–2)
LDLC SERPL CALC-MCNC: 110 MG/DL (ref 0–100)
LYMPHOCYTES # BLD AUTO: 1.49 THOUSANDS/ΜL (ref 0.6–4.47)
LYMPHOCYTES NFR BLD AUTO: 37 % (ref 14–44)
MCH RBC QN AUTO: 29.1 PG (ref 26.8–34.3)
MCHC RBC AUTO-ENTMCNC: 31.7 G/DL (ref 31.4–37.4)
MCV RBC AUTO: 92 FL (ref 82–98)
MICROALBUMIN UR-MCNC: 10.5 MG/L (ref 0–20)
MICROALBUMIN/CREAT 24H UR: 12 MG/G CREATININE (ref 0–30)
MONOCYTES # BLD AUTO: 0.36 THOUSAND/ΜL (ref 0.17–1.22)
MONOCYTES NFR BLD AUTO: 9 % (ref 4–12)
NEUTROPHILS # BLD AUTO: 1.89 THOUSANDS/ΜL (ref 1.85–7.62)
NEUTS SEG NFR BLD AUTO: 47 % (ref 43–75)
NONHDLC SERPL-MCNC: 134 MG/DL
NRBC BLD AUTO-RTO: 0 /100 WBCS
PLATELET # BLD AUTO: 258 THOUSANDS/UL (ref 149–390)
PMV BLD AUTO: 10 FL (ref 8.9–12.7)
POTASSIUM SERPL-SCNC: 4.2 MMOL/L (ref 3.5–5.3)
PROT SERPL-MCNC: 8.4 G/DL (ref 6.4–8.2)
RBC # BLD AUTO: 4.53 MILLION/UL (ref 3.81–5.12)
SODIUM SERPL-SCNC: 137 MMOL/L (ref 136–145)
TRIGL SERPL-MCNC: 122 MG/DL
WBC # BLD AUTO: 4 THOUSAND/UL (ref 4.31–10.16)

## 2019-05-13 PROCEDURE — 80061 LIPID PANEL: CPT

## 2019-05-13 PROCEDURE — 85025 COMPLETE CBC W/AUTO DIFF WBC: CPT

## 2019-05-13 PROCEDURE — 80053 COMPREHEN METABOLIC PANEL: CPT

## 2019-05-13 PROCEDURE — 36415 COLL VENOUS BLD VENIPUNCTURE: CPT

## 2019-05-13 PROCEDURE — 82570 ASSAY OF URINE CREATININE: CPT

## 2019-05-13 PROCEDURE — 82043 UR ALBUMIN QUANTITATIVE: CPT

## 2019-05-13 PROCEDURE — 83036 HEMOGLOBIN GLYCOSYLATED A1C: CPT

## 2019-05-14 ENCOUNTER — OFFICE VISIT (OUTPATIENT)
Dept: INTERNAL MEDICINE CLINIC | Facility: CLINIC | Age: 74
End: 2019-05-14
Payer: MEDICARE

## 2019-05-14 VITALS
OXYGEN SATURATION: 99 % | RESPIRATION RATE: 18 BRPM | HEART RATE: 84 BPM | WEIGHT: 194 LBS | BODY MASS INDEX: 35.7 KG/M2 | SYSTOLIC BLOOD PRESSURE: 120 MMHG | DIASTOLIC BLOOD PRESSURE: 74 MMHG | HEIGHT: 62 IN

## 2019-05-14 DIAGNOSIS — Z23 ENCOUNTER FOR IMMUNIZATION: ICD-10-CM

## 2019-05-14 DIAGNOSIS — Z00.00 MEDICARE ANNUAL WELLNESS VISIT, INITIAL: Primary | ICD-10-CM

## 2019-05-14 DIAGNOSIS — E78.2 MIXED HYPERLIPIDEMIA: ICD-10-CM

## 2019-05-14 DIAGNOSIS — E11.9 TYPE 2 DIABETES MELLITUS WITHOUT COMPLICATION, WITHOUT LONG-TERM CURRENT USE OF INSULIN (HCC): ICD-10-CM

## 2019-05-14 DIAGNOSIS — I10 HYPERTENSION, BENIGN: ICD-10-CM

## 2019-05-14 PROCEDURE — G0009 ADMIN PNEUMOCOCCAL VACCINE: HCPCS | Performed by: INTERNAL MEDICINE

## 2019-05-14 PROCEDURE — 99214 OFFICE O/P EST MOD 30 MIN: CPT | Performed by: INTERNAL MEDICINE

## 2019-05-14 PROCEDURE — G0439 PPPS, SUBSEQ VISIT: HCPCS | Performed by: INTERNAL MEDICINE

## 2019-05-14 PROCEDURE — 90732 PPSV23 VACC 2 YRS+ SUBQ/IM: CPT | Performed by: INTERNAL MEDICINE

## 2019-08-29 DIAGNOSIS — E78.2 MIXED HYPERLIPIDEMIA: ICD-10-CM

## 2019-08-29 RX ORDER — LOVASTATIN 10 MG/1
TABLET ORAL
Qty: 90 TABLET | Refills: 1 | Status: SHIPPED | OUTPATIENT
Start: 2019-08-29 | End: 2020-08-12

## 2019-09-19 ENCOUNTER — APPOINTMENT (OUTPATIENT)
Dept: LAB | Facility: CLINIC | Age: 74
End: 2019-09-19
Payer: MEDICARE

## 2019-09-19 DIAGNOSIS — E11.9 TYPE 2 DIABETES MELLITUS WITHOUT COMPLICATION, WITHOUT LONG-TERM CURRENT USE OF INSULIN (HCC): ICD-10-CM

## 2019-09-19 LAB
EST. AVERAGE GLUCOSE BLD GHB EST-MCNC: 137 MG/DL
HBA1C MFR BLD: 6.4 % (ref 4.2–6.3)

## 2019-09-19 PROCEDURE — 36415 COLL VENOUS BLD VENIPUNCTURE: CPT

## 2019-09-19 PROCEDURE — 83036 HEMOGLOBIN GLYCOSYLATED A1C: CPT

## 2019-09-23 ENCOUNTER — OFFICE VISIT (OUTPATIENT)
Dept: INTERNAL MEDICINE CLINIC | Facility: CLINIC | Age: 74
End: 2019-09-23
Payer: MEDICARE

## 2019-09-23 VITALS
SYSTOLIC BLOOD PRESSURE: 100 MMHG | DIASTOLIC BLOOD PRESSURE: 64 MMHG | HEART RATE: 68 BPM | RESPIRATION RATE: 16 BRPM | OXYGEN SATURATION: 98 % | HEIGHT: 62 IN | BODY MASS INDEX: 32.76 KG/M2 | WEIGHT: 178 LBS

## 2019-09-23 DIAGNOSIS — I10 HYPERTENSION, BENIGN: ICD-10-CM

## 2019-09-23 DIAGNOSIS — Z23 NEED FOR INFLUENZA VACCINATION: Primary | ICD-10-CM

## 2019-09-23 DIAGNOSIS — E78.2 MIXED HYPERLIPIDEMIA: ICD-10-CM

## 2019-09-23 DIAGNOSIS — E11.9 TYPE 2 DIABETES MELLITUS WITHOUT COMPLICATION, WITHOUT LONG-TERM CURRENT USE OF INSULIN (HCC): ICD-10-CM

## 2019-09-23 PROCEDURE — 99214 OFFICE O/P EST MOD 30 MIN: CPT

## 2019-09-23 PROCEDURE — 90662 IIV NO PRSV INCREASED AG IM: CPT

## 2019-09-23 PROCEDURE — G0008 ADMIN INFLUENZA VIRUS VAC: HCPCS

## 2019-09-23 RX ORDER — AMLODIPINE BESYLATE 5 MG/1
TABLET ORAL
Qty: 90 TABLET | Refills: 1 | Status: SHIPPED | OUTPATIENT
Start: 2019-09-23 | End: 2020-03-21

## 2019-09-23 NOTE — PROGRESS NOTES
Assessment/Plan:     Sugars are doing much better  Continue diet  Continue current medications  Ordered labs for next visit  BMI Counseling: Body mass index is 32 56 kg/m²  Discussed the patient's BMI with her  The BMI is above normal  Nutrition recommendations include moderation in carbohydrate intake  Return in about 4 months (around 1/23/2020)  No problem-specific Assessment & Plan notes found for this encounter  Diagnoses and all orders for this visit:    Need for influenza vaccination  -     influenza vaccine, 4752-6563, high-dose, PF 0 5 mL (FLUZONE HIGH-DOSE)    Type 2 diabetes mellitus without complication, without long-term current use of insulin ([facility] )  -     Comprehensive metabolic panel; Future  -     CBC and differential; Future  -     Hemoglobin A1C; Future  -     Lipid panel; Future  -     Microalbumin / creatinine urine ratio; Future  -     Ambulatory referral to Ophthalmology; Future    Hypertension, benign    Mixed hyperlipidemia          Subjective:      Patient ID: Iqra Griffin is a 68 y o  female  Patient comes in today for follow-up  Her sugars are doing much better  She has been really watching her diet  Taking the medicine as directed  She has lost some weight  Blood pressure is controlled  Cholesterol has been controlled  She has been to Neurology and her memory has improved  They report no new complaints today  No further additions to her history        ALLERGIES:  Allergies   Allergen Reactions    Nuts Anaphylaxis     Raw Almonds only    Codeine Other (See Comments)     Dizzy, lightheaded       CURRENT MEDICATIONS:    Current Outpatient Medications:     amLODIPine (NORVASC) 5 mg tablet, TAKE 1 TABLET (5 MG TOTAL) BY MOUTH DAILY, Disp: 90 tablet, Rfl: 1    aspirin (ECOTRIN LOW STRENGTH) 81 mg EC tablet, Take 1 tablet (81 mg total) by mouth daily, Disp: , Rfl: 0    glucose blood (ACCU-CHEK GUIDE) test strip, Check sugar daily, Disp: 50 each, Rfl: 5   hyoscyamine (ANASPAZ,LEVSIN) 0 125 MG tablet, TAKE 1 TABLET 3-4 TIMES DAILY AS NEEDED , Disp: 60 tablet, Rfl: 2    levocetirizine (XYZAL) 5 MG tablet, Take 5 mg by mouth as needed , Disp: , Rfl:     lovastatin (MEVACOR) 10 MG tablet, TAKE 1 TABLET BY MOUTH EVERYDAY AT BEDTIME, Disp: 90 tablet, Rfl: 1    metFORMIN (GLUCOPHAGE) 1000 MG tablet, TAKE 1 TABLET TWICE DAILY, Disp: 180 tablet, Rfl: 2    quinapril-hydrochlorothiazide (ACCURETIC) 20-25 MG per tablet, Take 1 tablet by mouth daily, Disp: 90 tablet, Rfl: 1    ACTIVE PROBLEM LIST:  Patient Active Problem List   Diagnosis    Allergic rhinitis    Mixed hyperlipidemia    Hypertension, benign    Irritable bowel syndrome without diarrhea    Type 2 diabetes mellitus without complication, without long-term current use of insulin ([facility])    Screening for malignant neoplasm of breast    Abnormal mammogram    Breast pain, right    Paresthesia of both feet    Bilateral cold feet    Chronic bilateral low back pain    Memory loss    Speech defect    Other headache syndrome       PAST MEDICAL HISTORY:  Past Medical History:   Diagnosis Date    Arthritis     Diabetes mellitus ([address] )     HNP (herniated nucleus pulposus), lumbar     Hyperlipidemia     Hypertension     IBS (irritable bowel syndrome)     Osteoarthritis        PAST SURGICAL HISTORY:  Past Surgical History:   Procedure Laterality Date    PARATHYROID GLAND SURGERY      resolved 05/12    CT ESOPHAGOGASTRODUODENOSCOPY TRANSORAL DIAGNOSTIC N/A 4/19/2017    Procedure: EGD AND COLONOSCOPY;  Surgeon: Gela Casillas MD;  Location: [facility];   Service: Gastroenterology    UMBILICAL HERNIA REPAIR         FAMILY HISTORY:  Family History   Problem Relation Age of Onset    Breast cancer Mother     Heart defect Brother     Arthritis Family     Hypertension Family     No Known Problems Father     No Known Problems Brother        SOCIAL HISTORY:  Social History     Socioeconomic History    Marital status: /Civil Union     Spouse name: Not on file    Number of children: 2    Years of education: Not on file    Highest education level: Not on file   Occupational History    Occupation: 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[employer]     Comment: retired   Social Needs    Financial resource strain: Not on file    Food insecurity:     Worry: Not on file     Inability: Not on file   Klarna needs:     Medical: Not on file     Non-medical: Not on file   Tobacco Use    Smoking status: Never Smoker    Smokeless tobacco: Never Used   Substance and Sexual Activity    Alcohol use: No    Drug use: No    Sexual activity: Yes     Partners: Male   Lifestyle    Physical activity:     Days per week: Not on file     Minutes per session: Not on file    Stress: Not on file   Relationships    Social connections:     Talks on phone: Not on file     Gets together: Not on file     Attends Anabaptist service: Not on file     Active member of club or organization: Not on file     Attends meetings of clubs or organizations: Not on file     Relationship status: Not on file    Intimate partner violence:     Fear of current or ex partner: Not on file     Emotionally abused: Not on file     Physically abused: Not on file     Forced sexual activity: Not on file   Other Topics Concern    Not on file   Social History Narrative    Not on file       Review of Systems   Respiratory: Negative for shortness of breath  Cardiovascular: Negative for chest pain  Gastrointestinal: Negative for abdominal pain  Objective:  Vitals:    09/23/19 1523   BP: 100/64   BP Location: Left arm   Patient Position: Sitting   Cuff Size: Standard   Pulse: 68   Resp: 16   SpO2: 98%   Weight: 80 7 kg (178 lb)   Height: 5' 2" (1 575 m)     Body mass index is 32 56 kg/m²  Physical Exam   Constitutional: She is oriented to person, place, and time  She appears well-developed and well-nourished     Cardiovascular: Normal rate, regular rhythm and normal heart sounds  Pulmonary/Chest: Effort normal and breath sounds normal    Abdominal: Soft  There is no tenderness  Neurological: She is alert and oriented to person, place, and time  Nursing note and vitals reviewed  RESULTS:    Recent Results (from the past 1008 hour(s))   Hemoglobin A1C    Collection Time: 09/19/19  8:40 AM   Result Value Ref Range    Hemoglobin A1C 6 4 (H) 4 2 - 6 3 %     mg/dl       This note was created with voice recognition software  Phonic, grammatical and spelling errors may be present within the note as a result

## 2019-11-19 ENCOUNTER — APPOINTMENT (EMERGENCY)
Dept: RADIOLOGY | Facility: HOSPITAL | Age: 74
End: 2019-11-19
Payer: MEDICARE

## 2019-11-19 ENCOUNTER — HOSPITAL ENCOUNTER (EMERGENCY)
Facility: HOSPITAL | Age: 74
Discharge: HOME | End: 2019-11-19
Attending: EMERGENCY MEDICINE
Payer: MEDICARE

## 2019-11-19 VITALS
RESPIRATION RATE: 16 BRPM | BODY MASS INDEX: 29.94 KG/M2 | OXYGEN SATURATION: 100 % | SYSTOLIC BLOOD PRESSURE: 144 MMHG | TEMPERATURE: 97.7 F | HEART RATE: 70 BPM | WEIGHT: 179.68 LBS | DIASTOLIC BLOOD PRESSURE: 82 MMHG | HEIGHT: 65 IN

## 2019-11-19 DIAGNOSIS — M25.552 LEFT HIP PAIN: Primary | ICD-10-CM

## 2019-11-19 DIAGNOSIS — S90.122A CONTUSION OF LESSER TOE OF LEFT FOOT WITHOUT DAMAGE TO NAIL, INITIAL ENCOUNTER: ICD-10-CM

## 2019-11-19 DIAGNOSIS — R10.84 GENERALIZED ABDOMINAL PAIN: ICD-10-CM

## 2019-11-19 PROCEDURE — 73564 X-RAY EXAM KNEE 4 OR MORE: CPT

## 2019-11-19 PROCEDURE — 99284 EMERGENCY DEPT VISIT MOD MDM: CPT | Performed by: EMERGENCY MEDICINE

## 2019-11-19 PROCEDURE — 73630 X-RAY EXAM OF FOOT: CPT

## 2019-11-19 PROCEDURE — 99283 EMERGENCY DEPT VISIT LOW MDM: CPT

## 2019-11-19 PROCEDURE — 73502 X-RAY EXAM HIP UNI 2-3 VIEWS: CPT

## 2019-11-19 RX ORDER — ACETAMINOPHEN 325 MG/1
975 TABLET ORAL ONCE
Status: COMPLETED | OUTPATIENT
Start: 2019-11-19 | End: 2019-11-19

## 2019-11-19 RX ORDER — HYOSCYAMINE SULFATE 0.125 MG
TABLET ORAL
Qty: 60 TABLET | Refills: 2 | Status: SHIPPED | OUTPATIENT
Start: 2019-11-19 | End: 2021-08-17

## 2019-11-19 RX ORDER — DICYCLOMINE HCL 20 MG
20 TABLET ORAL ONCE
Status: COMPLETED | OUTPATIENT
Start: 2019-11-19 | End: 2019-11-19

## 2019-11-19 RX ORDER — NAPROXEN 250 MG/1
250 TABLET ORAL 2 TIMES DAILY WITH MEALS
Qty: 20 TABLET | Refills: 0 | Status: SHIPPED | OUTPATIENT
Start: 2019-11-19

## 2019-11-19 RX ADMIN — ACETAMINOPHEN 975 MG: 325 TABLET, FILM COATED ORAL at 16:13

## 2019-11-19 RX ADMIN — DICYCLOMINE HYDROCHLORIDE 20 MG: 20 TABLET ORAL at 16:13

## 2019-11-19 NOTE — ED PROVIDER NOTES
History  Chief Complaint   Patient presents with    Hip Pain     pt c/o left sided hip pain x3 only when walking and when laying flat at night  no injury   Toe Injury     pt c/o left 5th toe pain after hitting it a month ago  History provided by:  Patient  Hip Pain   Location:  Left lateral hip  Quality:  Aching and sore wtih walking and laying on it  Severity:  Moderate  Onset quality:  Gradual  Duration:  3 weeks  Timing:  Constant  Progression:  Worsening  Chronicity:  New  Context:  Pt sustained a fall about 1 month ago after tripping, landed on left anterior knee and elbow, did not recall specific hip injury  Over the past few weeks has progressing and worsening lateral left hip pain  Also later stubbed and hit left 5th toe  Relieved by:  None tried  Worsened by: Walking, laying on it  Ineffective treatments:  None tried  Associated symptoms: no abdominal pain, no chest pain, no fatigue, no fever, no nausea, no rash, no rhinorrhea, no vomiting and no wheezing        Prior to Admission Medications   Prescriptions Last Dose Informant Patient Reported? Taking? amLODIPine (NORVASC) 5 mg tablet   No No   Sig: TAKE 1 TABLET (5 MG TOTAL) BY MOUTH DAILY   aspirin (ECOTRIN LOW STRENGTH) 81 mg EC tablet   No No   Sig: Take 1 tablet (81 mg total) by mouth daily   glucose blood (ACCU-CHEK GUIDE) test strip   No No   Sig: Check sugar daily   hyoscyamine (ANASPAZ,LEVSIN) 0 125 MG tablet   No No   Sig: TAKE 1 TABLET 3-4 TIMES DAILY AS NEEDED     levocetirizine (XYZAL) 5 MG tablet  Self Yes No   Sig: Take 5 mg by mouth as needed    lovastatin (MEVACOR) 10 MG tablet   No No   Sig: TAKE 1 TABLET BY MOUTH EVERYDAY AT BEDTIME   metFORMIN (GLUCOPHAGE) 1000 MG tablet   No No   Sig: TAKE 1 TABLET TWICE DAILY   quinapril-hydrochlorothiazide (ACCURETIC) 20-25 MG per tablet   No No   Sig: Take 1 tablet by mouth daily      Facility-Administered Medications: None       Past Medical History:   Diagnosis Date    Arthritis     Diabetes mellitus ([facility] )     HNP (herniated nucleus pulposus), lumbar     Hyperlipidemia     Hypertension     IBS (irritable bowel syndrome)     Osteoarthritis        Past Surgical History:   Procedure Laterality Date    PARATHYROID GLAND SURGERY      resolved 05/12    [place] ESOPHAGOGASTRODUODENOSCOPY TRANSORAL DIAGNOSTIC N/A 4/19/2017    Procedure: EGD AND COLONOSCOPY;  Surgeon: Ramiro Oro MD;  Location: [facility]; Service: Gastroenterology    UMBILICAL HERNIA REPAIR         Family History   Problem Relation Age of Onset    Breast cancer Mother     Heart defect Brother     Arthritis Family     Hypertension Family     No Known Problems Father     No Known Problems Brother      I have reviewed and agree with the history as documented  Social History     Tobacco Use    Smoking status: Never Smoker    Smokeless tobacco: Never Used   Substance Use Topics    Alcohol use: No    Drug use: No        Review of Systems   Constitutional: Negative for fatigue and fever  HENT: Negative for rhinorrhea  Respiratory: Negative for wheezing  Cardiovascular: Negative for chest pain  Gastrointestinal: Negative for abdominal pain, nausea and vomiting  Skin: Negative for rash  All other systems reviewed and are negative  Physical Exam  Physical Exam   Constitutional: She appears well-developed and well-nourished  HENT:   Head: Normocephalic and atraumatic  Cardiovascular: Normal rate  Pulmonary/Chest: Effort normal    Musculoskeletal: She exhibits no edema  Left hip: She exhibits tenderness (to lateral left hip, not within inguinal crease, it's over her greater trochanter)  She exhibits no bony tenderness, no swelling, no deformity and no laceration  Left knee: She exhibits laceration  She exhibits normal range of motion, no swelling, no effusion, no ecchymosis and no deformity   Tenderness (anterior tenderness, healed scab/abrasion to left anterior knee from fall) found  Left foot: There is tenderness (left 5th toe tenderness, mild swelling)  There is normal range of motion and normal capillary refill  Vitals reviewed  Vital Signs  ED Triage Vitals [11/19/19 1428]   Temperature Pulse Respirations Blood Pressure SpO2   97 7 °F (36 5 °C) 70 16 144/82 100 %      Temp Source Heart Rate Source Patient Position - Orthostatic VS BP Location FiO2 (%)   Oral Monitor Sitting Left arm --      Pain Score       --           Vitals:    11/19/19 1428   BP: 144/82   Pulse: 70   Patient Position - Orthostatic VS: Sitting         Visual Acuity      ED Medications  Medications   acetaminophen (TYLENOL) tablet 975 mg (975 mg Oral Given 11/19/19 1613)   dicyclomine (BENTYL) tablet 20 mg (20 mg Oral Given 11/19/19 1613)       Diagnostic Studies  Results Reviewed     None                 XR hip/pelv 2-3 vws left   ED Interpretation by Fátima Blackmon MD (11/19 1554)   NAD      Final Result by Ambreen Rueda DO (11/19 1628)      No acute osseous abnormality  Workstation performed: [id number]         XR foot 3+ views LEFT   ED Interpretation by Fátima Blackmon MD (11/19 1554)   NAD      Final Result by Ambreen Rueda DO (11/19 1631)      No acute osseous abnormality  Workstation performed: [id number]         XR knee 4+ vw left injury   ED Interpretation by Fátima Blackmon MD (11/19 1554)   NAD      Final Result by Ambreen Rueda DO (11/19 1554)      No acute osseous abnormality  Mild to moderate tricompartmental osteoarthritis  Workstation performed: [id number]                    Procedures  Procedures       ED Course           Identification of Seniors at Risk      Most Recent Value   (ISAR) Identification of Seniors at Risk   Before the illness or injury that brought you to the Emergency, did you need someone to help you on a regular basis?   0 Filed at: 11/19/2019 1431   In the last 24 hours, have you needed more help than usual?  0 Filed at: 11/19/2019 1431   Have you been hospitalized for one or more nights during the past 6 months? 0 Filed at: 11/19/2019 1431   In general, do you see well?  0 Filed at: 11/19/2019 1431   In general, do you have serious problems with your memory? 0 Filed at: 11/19/2019 1431   Do you take more than three different medications every day? 1 Filed at: 11/19/2019 1431   ISAR Score  1 Filed at: 11/19/2019 1431                          [facility]  Number of Diagnoses or Management Options  Contusion of lesser toe of left foot without damage to nail, initial encounter: new and requires workup  Left hip pain: new and requires workup  Diagnosis management comments: Pt given bentyl b/c h/o IBS and takes meds for it, hasn't gotten pharmacy to get anything yet and wants a dose of something with here for abd cramps  Amount and/or Complexity of Data Reviewed  Tests in the radiology section of CPT®: ordered  Independent visualization of images, tracings, or specimens: yes    Risk of Complications, Morbidity, and/or Mortality  Presenting problems: moderate    Patient Progress  Patient progress: improved      Disposition  Final diagnoses:   Left hip pain   Contusion of lesser toe of left foot without damage to nail, initial encounter     Time reflects when diagnosis was documented in both MDM as applicable and the Disposition within this note     Time User Action Codes Description Comment    11/19/2019  4:06 PM Cayla, [address] [facility] [facility]     11/19/2019  4:06 PM Cayla, [facility] Contusion of lesser toe of left foot without damage to nail, initial encounter       ED Disposition     ED Disposition Condition Date/Time Comment    Discharge Stable Tue Nov 19, 2019  4:06 PM Eric German discharge to home/self care              Follow-up Information     Follow up With Specialties Details Why Contact Info Additional [address] [facility] Emergency Department Emergency Medicine Go to  If symptoms worsen [address] [facility] [address] [phone] [place] [facility], [address] [facility], [place], [place], [address] Specialists [facility] Orthopedic Surgery Go to  If symptoms worsen [address] [facility]  Marco Horne [address] [facility], [address] [address] [facility], [place], [place], [address]          Discharge Medication List as of 11/19/2019  4:15 PM      START taking these medications    Details   naproxen (NAPROSYN) 250 mg tablet Take 1 tablet (250 mg total) by mouth 2 (two) times a day with meals, Starting Tue 11/19/2019, Print         CONTINUE these medications which have NOT CHANGED    Details   amLODIPine (NORVASC) 5 mg tablet TAKE 1 TABLET (5 MG TOTAL) BY MOUTH DAILY, Normal      aspirin (ECOTRIN LOW STRENGTH) 81 mg EC tablet Take 1 tablet (81 mg total) by mouth daily, Starting Mon 1/7/2019, No Print      glucose blood (ACCU-CHEK GUIDE) test strip Check sugar daily, Normal      hyoscyamine (ANASPAZ,LEVSIN) 0 125 MG tablet TAKE 1 TABLET 3-4 TIMES DAILY AS NEEDED , Normal      levocetirizine (XYZAL) 5 MG tablet Take 5 mg by mouth as needed , Historical Med      lovastatin (MEVACOR) 10 MG tablet TAKE 1 TABLET BY MOUTH EVERYDAY AT BEDTIME, Normal      metFORMIN (GLUCOPHAGE) 1000 MG tablet TAKE 1 TABLET TWICE DAILY, Normal      quinapril-hydrochlorothiazide (ACCURETIC) 20-25 MG per tablet Take 1 tablet by mouth daily, Starting Mon 3/18/2019, Normal           No discharge procedures on file      ED Provider  Electronically Signed by           Pierre Paniagua MD  11/19/19 7160

## 2019-12-18 DIAGNOSIS — E11.9 TYPE 2 DIABETES MELLITUS WITHOUT COMPLICATION, WITHOUT LONG-TERM CURRENT USE OF INSULIN (HCC): ICD-10-CM

## 2020-01-29 ENCOUNTER — APPOINTMENT (OUTPATIENT)
Dept: LAB | Facility: CLINIC | Age: 75
End: 2020-01-29
Payer: MEDICARE

## 2020-01-29 ENCOUNTER — TRANSCRIBE ORDERS (OUTPATIENT)
Dept: LAB | Facility: CLINIC | Age: 75
End: 2020-01-29

## 2020-01-29 ENCOUNTER — OFFICE VISIT (OUTPATIENT)
Dept: INTERNAL MEDICINE CLINIC | Facility: CLINIC | Age: 75
End: 2020-01-29
Payer: MEDICARE

## 2020-01-29 VITALS
OXYGEN SATURATION: 98 % | BODY MASS INDEX: 29.82 KG/M2 | WEIGHT: 179 LBS | HEART RATE: 75 BPM | SYSTOLIC BLOOD PRESSURE: 122 MMHG | RESPIRATION RATE: 16 BRPM | DIASTOLIC BLOOD PRESSURE: 60 MMHG | HEIGHT: 65 IN

## 2020-01-29 DIAGNOSIS — E11.9 TYPE 2 DIABETES MELLITUS WITHOUT COMPLICATION, WITHOUT LONG-TERM CURRENT USE OF INSULIN (HCC): Primary | ICD-10-CM

## 2020-01-29 DIAGNOSIS — E78.2 MIXED HYPERLIPIDEMIA: ICD-10-CM

## 2020-01-29 DIAGNOSIS — E11.9 TYPE 2 DIABETES MELLITUS WITHOUT COMPLICATION, WITHOUT LONG-TERM CURRENT USE OF INSULIN (HCC): ICD-10-CM

## 2020-01-29 DIAGNOSIS — I10 HYPERTENSION, BENIGN: ICD-10-CM

## 2020-01-29 LAB
ALBUMIN SERPL BCP-MCNC: 3.6 G/DL (ref 3.5–5)
ALP SERPL-CCNC: 103 U/L (ref 46–116)
ALT SERPL W P-5'-P-CCNC: 22 U/L (ref 12–78)
ANION GAP SERPL CALCULATED.3IONS-SCNC: 2 MMOL/L (ref 4–13)
AST SERPL W P-5'-P-CCNC: 13 U/L (ref 5–45)
BASOPHILS # BLD AUTO: 0.03 THOUSANDS/ΜL (ref 0–0.1)
BASOPHILS NFR BLD AUTO: 1 % (ref 0–1)
BILIRUB SERPL-MCNC: 0.5 MG/DL (ref 0.2–1)
BUN SERPL-MCNC: 13 MG/DL (ref 5–25)
CALCIUM SERPL-MCNC: 9.7 MG/DL (ref 8.3–10.1)
CHLORIDE SERPL-SCNC: 105 MMOL/L (ref 100–108)
CHOLEST SERPL-MCNC: 189 MG/DL (ref 50–200)
CO2 SERPL-SCNC: 30 MMOL/L (ref 21–32)
CREAT SERPL-MCNC: 1.1 MG/DL (ref 0.6–1.3)
CREAT UR-MCNC: 97.2 MG/DL
EOSINOPHIL # BLD AUTO: 0.11 THOUSAND/ΜL (ref 0–0.61)
EOSINOPHIL NFR BLD AUTO: 3 % (ref 0–6)
ERYTHROCYTE [DISTWIDTH] IN BLOOD BY AUTOMATED COUNT: 13.5 % (ref 11.6–15.1)
EST. AVERAGE GLUCOSE BLD GHB EST-MCNC: 143 MG/DL
GFR SERPL CREATININE-BSD FRML MDRD: 57 ML/MIN/1.73SQ M
GLUCOSE P FAST SERPL-MCNC: 95 MG/DL (ref 65–99)
HBA1C MFR BLD: 6.6 % (ref 4.2–6.3)
HCT VFR BLD AUTO: 42.5 % (ref 34.8–46.1)
HDLC SERPL-MCNC: 56 MG/DL
HGB BLD-MCNC: 13.2 G/DL (ref 11.5–15.4)
IMM GRANULOCYTES # BLD AUTO: 0 THOUSAND/UL (ref 0–0.2)
IMM GRANULOCYTES NFR BLD AUTO: 0 % (ref 0–2)
LDLC SERPL CALC-MCNC: 116 MG/DL (ref 0–100)
LYMPHOCYTES # BLD AUTO: 1.56 THOUSANDS/ΜL (ref 0.6–4.47)
LYMPHOCYTES NFR BLD AUTO: 41 % (ref 14–44)
MCH RBC QN AUTO: 28.7 PG (ref 26.8–34.3)
MCHC RBC AUTO-ENTMCNC: 31.1 G/DL (ref 31.4–37.4)
MCV RBC AUTO: 92 FL (ref 82–98)
MICROALBUMIN UR-MCNC: 7.3 MG/L (ref 0–20)
MICROALBUMIN/CREAT 24H UR: 8 MG/G CREATININE (ref 0–30)
MONOCYTES # BLD AUTO: 0.44 THOUSAND/ΜL (ref 0.17–1.22)
MONOCYTES NFR BLD AUTO: 12 % (ref 4–12)
NEUTROPHILS # BLD AUTO: 1.68 THOUSANDS/ΜL (ref 1.85–7.62)
NEUTS SEG NFR BLD AUTO: 43 % (ref 43–75)
NONHDLC SERPL-MCNC: 133 MG/DL
NRBC BLD AUTO-RTO: 0 /100 WBCS
PLATELET # BLD AUTO: 268 THOUSANDS/UL (ref 149–390)
PMV BLD AUTO: 10.3 FL (ref 8.9–12.7)
POTASSIUM SERPL-SCNC: 3.9 MMOL/L (ref 3.5–5.3)
PROT SERPL-MCNC: 8.1 G/DL (ref 6.4–8.2)
RBC # BLD AUTO: 4.6 MILLION/UL (ref 3.81–5.12)
SODIUM SERPL-SCNC: 137 MMOL/L (ref 136–145)
TRIGL SERPL-MCNC: 84 MG/DL
WBC # BLD AUTO: 3.82 THOUSAND/UL (ref 4.31–10.16)

## 2020-01-29 PROCEDURE — 85025 COMPLETE CBC W/AUTO DIFF WBC: CPT

## 2020-01-29 PROCEDURE — 99214 OFFICE O/P EST MOD 30 MIN: CPT | Performed by: INTERNAL MEDICINE

## 2020-01-29 PROCEDURE — 80053 COMPREHEN METABOLIC PANEL: CPT

## 2020-01-29 PROCEDURE — 83036 HEMOGLOBIN GLYCOSYLATED A1C: CPT

## 2020-01-29 PROCEDURE — 36415 COLL VENOUS BLD VENIPUNCTURE: CPT

## 2020-01-29 PROCEDURE — 82570 ASSAY OF URINE CREATININE: CPT

## 2020-01-29 PROCEDURE — 80061 LIPID PANEL: CPT

## 2020-01-29 PROCEDURE — 82043 UR ALBUMIN QUANTITATIVE: CPT

## 2020-01-29 NOTE — PROGRESS NOTES
Right Foot/Ankle   Right Foot Inspection  Skin Exam: skin normal and skin intact no dry skin, no warmth, no callus, no erythema, no maceration, no abnormal color, no pre-ulcer, no ulcer and no callus                            Sensory   Vibration: intact  Proprioception: intact   Monofilament testing: intact  Vascular    The right DP pulse is 2+  Right Toe  - Comprehensive Exam  Ecchymosis: none  Arch: normal  Swelling: none   Tenderness: none         Left Foot/Ankle  Left Foot Inspection  Skin Exam: skin normal and skin intactno dry skin, no warmth, no erythema, no maceration, normal color, no pre-ulcer, no ulcer and no callus                                         Sensory   Vibration: intact  Proprioception: intact  Monofilament: intact  Vascular    The left DP pulse is 2+  Left Toe  - Comprehensive Exam  Ecchymosis: none  Arch: normal  Swelling: none   Tenderness: none       Assign Risk Category:  No deformity present; No loss of protective sensation;  No weak pulses       Risk: 0

## 2020-01-29 NOTE — PROGRESS NOTES
Assessment/Plan:     Await labs  Will adjust meds if needed  Reviewed diet with her and her   Most likely they she did a lot over the holidays  Encouraged them to get back on track  No medication changes presently  Ordered labs for next visit  Quality Measures:       No follow-ups on file  No problem-specific Assessment & Plan notes found for this encounter  Diagnoses and all orders for this visit:    Type 2 diabetes mellitus without complication, without long-term current use of insulin (HCC)  -     Comprehensive metabolic panel; Future  -     CBC and differential; Future  -     Hemoglobin A1C; Future  -     Lipid panel; Future    Hypertension, benign    Mixed hyperlipidemia          Subjective:      Patient ID: Jorge A Renteria is a 76 y o  female  Patient comes in today for routine follow-up with her   They just did her blood work this morning so we do not have the results yet  But they admit that they definitely indulged over the holidays  Sugars will probably be higher  She is taking her medicines as directed  Her blood pressure is controlled  Cholesterol has been controlled  She is taking that cholesterol medicine  Denies any new complaints today  No further additions to her history        ALLERGIES:  Allergies   Allergen Reactions    Nuts Anaphylaxis     Raw Almonds only    Codeine Other (See Comments)     Dizzy, lightheaded       CURRENT MEDICATIONS:    Current Outpatient Medications:     amLODIPine (NORVASC) 5 mg tablet, TAKE 1 TABLET (5 MG TOTAL) BY MOUTH DAILY, Disp: 90 tablet, Rfl: 1    glucose blood (ACCU-CHEK GUIDE) test strip, Check sugar daily, Disp: 50 each, Rfl: 5    hyoscyamine (ANASPAZ,LEVSIN) 0 125 MG tablet, TAKE 1 TABLET 3-4 TIMES DAILY AS NEEDED , Disp: 60 tablet, Rfl: 2    levocetirizine (XYZAL) 5 MG tablet, Take 5 mg by mouth as needed , Disp: , Rfl:     lovastatin (MEVACOR) 10 MG tablet, TAKE 1 TABLET BY MOUTH EVERYDAY AT BEDTIME, Disp: 90 tablet, Rfl: 1    metFORMIN (GLUCOPHAGE) 1000 MG tablet, TAKE 1 TABLET TWICE DAILY, Disp: 180 tablet, Rfl: 2    naproxen (NAPROSYN) 250 mg tablet, Take 1 tablet (250 mg total) by mouth 2 (two) times a day with meals, Disp: 20 tablet, Rfl: 0    quinapril-hydrochlorothiazide (ACCURETIC) 20-25 MG per tablet, Take 1 tablet by mouth daily, Disp: 90 tablet, Rfl: 1    aspirin (ECOTRIN LOW STRENGTH) 81 mg EC tablet, Take 1 tablet (81 mg total) by mouth daily (Patient not taking: Reported on 1/29/2020), Disp: , Rfl: 0    ACTIVE PROBLEM LIST:  Patient Active Problem List   Diagnosis    Allergic rhinitis    Mixed hyperlipidemia    Hypertension, benign    Irritable bowel syndrome without diarrhea    Type 2 diabetes mellitus without complication, without long-term current use of insulin ([facility])    Screening for malignant neoplasm of breast    Abnormal mammogram    Breast pain, right    Paresthesia of both feet    Bilateral cold feet    Chronic bilateral low back pain    Memory loss    Speech defect    Other headache syndrome       PAST MEDICAL HISTORY:  Past Medical History:   Diagnosis Date    Arthritis     Diabetes mellitus ([address] )     HNP (herniated nucleus pulposus), lumbar     Hyperlipidemia     Hypertension     IBS (irritable bowel syndrome)     Osteoarthritis        PAST SURGICAL HISTORY:  Past Surgical History:   Procedure Laterality Date    PARATHYROID GLAND SURGERY      resolved 05/12    AL ESOPHAGOGASTRODUODENOSCOPY TRANSORAL DIAGNOSTIC N/A 4/19/2017    Procedure: EGD AND COLONOSCOPY;  Surgeon: Yari Ramos MD;  Location: [facility];   Service: Gastroenterology    UMBILICAL HERNIA REPAIR         FAMILY HISTORY:  Family History   Problem Relation Age of Onset    Breast cancer Mother     Heart defect Brother     Arthritis Family     Hypertension Family     No Known Problems Father     No Known Problems Brother        SOCIAL HISTORY:  Social History     Socioeconomic History    Marital status: /Civil Union     Spouse name: Not on file    Number of children: 2    Years of education: Not on file    Highest education level: Not on file   Occupational History    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   Social Needs    Financial resource strain: Not on file    Food insecurity:     Worry: Not on file     Inability: Not on file   [place] needs:     Medical: Not on file     Non-medical: Not on file   Tobacco Use    Smoking status: Never Smoker    Smokeless tobacco: Never Used   Substance and Sexual Activity    Alcohol use: No    Drug use: No    Sexual activity: Yes     Partners: Male   Lifestyle    Physical activity:     Days per week: Not on file     Minutes per session: Not on file    Stress: Not on file   Relationships    Social connections:     Talks on phone: Not on file     Gets together: Not on file     Attends Spiritism service: Not on file     Active member of club or organization: Not on file     Attends meetings of clubs or organizations: Not on file     Relationship status: Not on file    Intimate partner violence:     Fear of current or ex partner: Not on file     Emotionally abused: Not on file     Physically abused: Not on file     Forced sexual activity: Not on file   Other Topics Concern    Not on file   Social History Narrative    Not on file       Review of Systems   Respiratory: Negative for shortness of breath  Cardiovascular: Negative for chest pain  Gastrointestinal: Negative for abdominal pain  Objective:  Vitals:    01/29/20 1218   BP: 122/60   BP Location: Left arm   Patient Position: Sitting   Cuff Size: Standard   Pulse: 75   Resp: 16   SpO2: 98%   Weight: 81 2 kg (179 lb)   Height: 5' 5" (1 651 m)     Body mass index is 29 79 kg/m²  Physical Exam   Constitutional: She is oriented to person, place, and time  She appears well-developed and well-nourished     Cardiovascular: Normal rate, regular rhythm and normal heart sounds  Pulmonary/Chest: Effort normal and breath sounds normal    Abdominal: Soft  There is no tenderness  Neurological: She is alert and oriented to person, place, and time  Nursing note and vitals reviewed  RESULTS:    No results found for this or any previous visit (from the past 1008 hour(s))  This note was created with voice recognition software  Phonic, grammatical and spelling errors may be present within the note as a result

## 2020-03-19 DIAGNOSIS — E11.9 TYPE 2 DIABETES MELLITUS WITHOUT COMPLICATION, WITHOUT LONG-TERM CURRENT USE OF INSULIN (HCC): ICD-10-CM

## 2020-03-21 DIAGNOSIS — I10 HYPERTENSION, BENIGN: ICD-10-CM

## 2020-03-21 RX ORDER — AMLODIPINE BESYLATE 5 MG/1
TABLET ORAL
Qty: 90 TABLET | Refills: 1 | Status: SHIPPED | OUTPATIENT
Start: 2020-03-21 | End: 2020-06-29

## 2020-03-30 DIAGNOSIS — I10 HYPERTENSION, BENIGN: ICD-10-CM

## 2020-03-30 RX ORDER — QUINAPRIL HCL AND HYDROCHLOROTHIAZIDE 20; 25 MG/1; MG/1
TABLET ORAL
Qty: 90 TABLET | Refills: 1 | Status: SHIPPED | OUTPATIENT
Start: 2020-03-30 | End: 2020-09-23

## 2020-05-12 DIAGNOSIS — Z12.39 SCREENING FOR MALIGNANT NEOPLASM OF BREAST: ICD-10-CM

## 2020-05-28 LAB
LEFT EYE DIABETIC RETINOPATHY: NORMAL
RIGHT EYE DIABETIC RETINOPATHY: NORMAL

## 2020-06-29 DIAGNOSIS — I10 HYPERTENSION, BENIGN: ICD-10-CM

## 2020-06-29 RX ORDER — AMLODIPINE BESYLATE 5 MG/1
TABLET ORAL
Qty: 90 TABLET | Refills: 1 | Status: SHIPPED | OUTPATIENT
Start: 2020-06-29 | End: 2020-12-28

## 2020-07-08 ENCOUNTER — APPOINTMENT (OUTPATIENT)
Dept: LAB | Facility: CLINIC | Age: 75
End: 2020-07-08
Payer: MEDICARE

## 2020-07-08 DIAGNOSIS — E11.9 TYPE 2 DIABETES MELLITUS WITHOUT COMPLICATION, WITHOUT LONG-TERM CURRENT USE OF INSULIN (HCC): ICD-10-CM

## 2020-07-08 LAB
ALBUMIN SERPL BCP-MCNC: 3.5 G/DL (ref 3.5–5)
ALP SERPL-CCNC: 94 U/L (ref 46–116)
ALT SERPL W P-5'-P-CCNC: 18 U/L (ref 12–78)
ANION GAP SERPL CALCULATED.3IONS-SCNC: 5 MMOL/L (ref 4–13)
AST SERPL W P-5'-P-CCNC: 13 U/L (ref 5–45)
BASOPHILS # BLD AUTO: 0.02 THOUSANDS/ΜL (ref 0–0.1)
BASOPHILS NFR BLD AUTO: 1 % (ref 0–1)
BILIRUB SERPL-MCNC: 0.44 MG/DL (ref 0.2–1)
BUN SERPL-MCNC: 24 MG/DL (ref 5–25)
CALCIUM SERPL-MCNC: 10.1 MG/DL (ref 8.3–10.1)
CHLORIDE SERPL-SCNC: 107 MMOL/L (ref 100–108)
CHOLEST SERPL-MCNC: 238 MG/DL (ref 50–200)
CO2 SERPL-SCNC: 27 MMOL/L (ref 21–32)
CREAT SERPL-MCNC: 1.17 MG/DL (ref 0.6–1.3)
EOSINOPHIL # BLD AUTO: 0.08 THOUSAND/ΜL (ref 0–0.61)
EOSINOPHIL NFR BLD AUTO: 2 % (ref 0–6)
ERYTHROCYTE [DISTWIDTH] IN BLOOD BY AUTOMATED COUNT: 13.3 % (ref 11.6–15.1)
EST. AVERAGE GLUCOSE BLD GHB EST-MCNC: 140 MG/DL
GFR SERPL CREATININE-BSD FRML MDRD: 53 ML/MIN/1.73SQ M
GLUCOSE P FAST SERPL-MCNC: 112 MG/DL (ref 65–99)
HBA1C MFR BLD: 6.5 %
HCT VFR BLD AUTO: 42.7 % (ref 34.8–46.1)
HDLC SERPL-MCNC: 55 MG/DL
HGB BLD-MCNC: 13.4 G/DL (ref 11.5–15.4)
IMM GRANULOCYTES # BLD AUTO: 0 THOUSAND/UL (ref 0–0.2)
IMM GRANULOCYTES NFR BLD AUTO: 0 % (ref 0–2)
LDLC SERPL CALC-MCNC: 159 MG/DL (ref 0–100)
LYMPHOCYTES # BLD AUTO: 1.34 THOUSANDS/ΜL (ref 0.6–4.47)
LYMPHOCYTES NFR BLD AUTO: 40 % (ref 14–44)
MCH RBC QN AUTO: 28.9 PG (ref 26.8–34.3)
MCHC RBC AUTO-ENTMCNC: 31.4 G/DL (ref 31.4–37.4)
MCV RBC AUTO: 92 FL (ref 82–98)
MONOCYTES # BLD AUTO: 0.48 THOUSAND/ΜL (ref 0.17–1.22)
MONOCYTES NFR BLD AUTO: 14 % (ref 4–12)
NEUTROPHILS # BLD AUTO: 1.45 THOUSANDS/ΜL (ref 1.85–7.62)
NEUTS SEG NFR BLD AUTO: 43 % (ref 43–75)
NONHDLC SERPL-MCNC: 183 MG/DL
NRBC BLD AUTO-RTO: 0 /100 WBCS
PLATELET # BLD AUTO: 286 THOUSANDS/UL (ref 149–390)
PMV BLD AUTO: 10 FL (ref 8.9–12.7)
POTASSIUM SERPL-SCNC: 3.9 MMOL/L (ref 3.5–5.3)
PROT SERPL-MCNC: 7.8 G/DL (ref 6.4–8.2)
RBC # BLD AUTO: 4.63 MILLION/UL (ref 3.81–5.12)
SODIUM SERPL-SCNC: 139 MMOL/L (ref 136–145)
TRIGL SERPL-MCNC: 121 MG/DL
WBC # BLD AUTO: 3.37 THOUSAND/UL (ref 4.31–10.16)

## 2020-07-08 PROCEDURE — 80061 LIPID PANEL: CPT

## 2020-07-08 PROCEDURE — 80053 COMPREHEN METABOLIC PANEL: CPT

## 2020-07-08 PROCEDURE — 83036 HEMOGLOBIN GLYCOSYLATED A1C: CPT

## 2020-07-08 PROCEDURE — 85025 COMPLETE CBC W/AUTO DIFF WBC: CPT

## 2020-07-08 PROCEDURE — 36415 COLL VENOUS BLD VENIPUNCTURE: CPT

## 2020-07-09 ENCOUNTER — OFFICE VISIT (OUTPATIENT)
Dept: INTERNAL MEDICINE CLINIC | Facility: CLINIC | Age: 75
End: 2020-07-09
Payer: MEDICARE

## 2020-07-09 VITALS
TEMPERATURE: 98.1 F | SYSTOLIC BLOOD PRESSURE: 118 MMHG | HEART RATE: 90 BPM | WEIGHT: 191 LBS | OXYGEN SATURATION: 98 % | HEIGHT: 65 IN | BODY MASS INDEX: 31.82 KG/M2 | DIASTOLIC BLOOD PRESSURE: 72 MMHG

## 2020-07-09 DIAGNOSIS — R20.2 PARESTHESIA: ICD-10-CM

## 2020-07-09 DIAGNOSIS — I10 HYPERTENSION, BENIGN: ICD-10-CM

## 2020-07-09 DIAGNOSIS — Z00.00 MEDICARE ANNUAL WELLNESS VISIT, SUBSEQUENT: Primary | ICD-10-CM

## 2020-07-09 DIAGNOSIS — E11.9 TYPE 2 DIABETES MELLITUS WITHOUT COMPLICATION, WITHOUT LONG-TERM CURRENT USE OF INSULIN (HCC): ICD-10-CM

## 2020-07-09 DIAGNOSIS — R53.83 FATIGUE, UNSPECIFIED TYPE: ICD-10-CM

## 2020-07-09 DIAGNOSIS — E78.2 MIXED HYPERLIPIDEMIA: ICD-10-CM

## 2020-07-09 DIAGNOSIS — M79.10 MYALGIA: ICD-10-CM

## 2020-07-09 PROCEDURE — 3074F SYST BP LT 130 MM HG: CPT | Performed by: INTERNAL MEDICINE

## 2020-07-09 PROCEDURE — G0439 PPPS, SUBSEQ VISIT: HCPCS | Performed by: INTERNAL MEDICINE

## 2020-07-09 PROCEDURE — 1036F TOBACCO NON-USER: CPT | Performed by: INTERNAL MEDICINE

## 2020-07-09 PROCEDURE — 3008F BODY MASS INDEX DOCD: CPT | Performed by: INTERNAL MEDICINE

## 2020-07-09 PROCEDURE — 1123F ACP DISCUSS/DSCN MKR DOCD: CPT | Performed by: INTERNAL MEDICINE

## 2020-07-09 PROCEDURE — 99214 OFFICE O/P EST MOD 30 MIN: CPT | Performed by: INTERNAL MEDICINE

## 2020-07-09 PROCEDURE — 1160F RVW MEDS BY RX/DR IN RCRD: CPT | Performed by: INTERNAL MEDICINE

## 2020-07-09 PROCEDURE — 3078F DIAST BP <80 MM HG: CPT | Performed by: INTERNAL MEDICINE

## 2020-07-09 PROCEDURE — 1170F FXNL STATUS ASSESSED: CPT | Performed by: INTERNAL MEDICINE

## 2020-07-09 PROCEDURE — 4040F PNEUMOC VAC/ADMIN/RCVD: CPT | Performed by: INTERNAL MEDICINE

## 2020-07-09 PROCEDURE — 1125F AMNT PAIN NOTED PAIN PRSNT: CPT | Performed by: INTERNAL MEDICINE

## 2020-07-09 PROCEDURE — 3044F HG A1C LEVEL LT 7.0%: CPT | Performed by: INTERNAL MEDICINE

## 2020-07-09 NOTE — PROGRESS NOTES
Assessment/Plan:     Chronic problems appear stable  Continue current medications  Reinforced diet and exercise  Resume her cholesterol medicine  For her leg symptoms, will order further labs  Rule out neuropathy, paresthesia  But it could be from her back since it predominantly bothers her at night  Does not sound like restless leg  For the varicose vein, suggested compression     Quality Measures: BMI Counseling: Body mass index is 31 78 kg/m²  The BMI is above normal  Nutrition recommendations include encouraging healthy choices of fruits and vegetables  Return in about 4 months (around 11/9/2020)  No problem-specific Assessment & Plan notes found for this encounter  Diagnoses and all orders for this visit:    Medicare annual wellness visit, subsequent    Type 2 diabetes mellitus without complication, without long-term current use of insulin ([facility] )  -     T4, free; Future  -     TSH, 3rd generation; Future    Hypertension, benign    Mixed hyperlipidemia    Fatigue, unspecified type    Paresthesia  -     Vitamin B12; Future    Myalgia  -     Vitamin D 25 hydroxy; Future          Subjective:      Patient ID: Munira Pressley is a 76 y o  female  Patient comes in today for follow-up with her  for regular visit and Medicare wellness  Her blood work shows her sugars have improved  Her cholesterol went up however  She admits that she probably slipped on that part of her diet  Her blood pressure is controlled  But she also mentioned that she was skipping her cholesterol medicine  She thought her diet was doing okay  She has gained some weight but lately she has been complaining of fatigue  Just feels tired after doing anything  No chest pain or shortness of breath  She also gets pains in her legs, mainly at night when she lays down        ALLERGIES:  Allergies   Allergen Reactions    Nuts Anaphylaxis     Raw Almonds only    Codeine Other (See Comments)     Dizzy, lightheaded CURRENT MEDICATIONS:    Current Outpatient Medications:     amLODIPine (NORVASC) 5 mg tablet, TAKE 1 TABLET BY MOUTH EVERY DAY, Disp: 90 tablet, Rfl: 1    glucose blood (Accu-Chek Guide) test strip, Check sugar daily, Disp: 50 each, Rfl: 5    hyoscyamine (ANASPAZ,LEVSIN) 0 125 MG tablet, TAKE 1 TABLET 3-4 TIMES DAILY AS NEEDED , Disp: 60 tablet, Rfl: 2    levocetirizine (XYZAL) 5 MG tablet, Take 5 mg by mouth as needed , Disp: , Rfl:     lovastatin (MEVACOR) 10 MG tablet, TAKE 1 TABLET BY MOUTH EVERYDAY AT BEDTIME, Disp: 90 tablet, Rfl: 1    metFORMIN (GLUCOPHAGE) 1000 MG tablet, TAKE 1 TABLET TWICE DAILY, Disp: 180 tablet, Rfl: 2    naproxen (NAPROSYN) 250 mg tablet, Take 1 tablet (250 mg total) by mouth 2 (two) times a day with meals, Disp: 20 tablet, Rfl: 0    quinapril-hydrochlorothiazide (ACCURETIC) 20-25 MG per tablet, TAKE 1 TABLET BY MOUTH EVERY DAY, Disp: 90 tablet, Rfl: 1    aspirin (ECOTRIN LOW STRENGTH) 81 mg EC tablet, Take 1 tablet (81 mg total) by mouth daily (Patient not taking: Reported on 1/29/2020), Disp: , Rfl: 0    ACTIVE PROBLEM LIST:  Patient Active Problem List   Diagnosis    Allergic rhinitis    Mixed hyperlipidemia    Hypertension, benign    Irritable bowel syndrome without diarrhea    Type 2 diabetes mellitus without complication, without long-term current use of insulin ([facility])    Screening for malignant neoplasm of breast    Abnormal mammogram    Breast pain, right    Paresthesia of both feet    Bilateral cold feet    Chronic bilateral low back pain    Memory loss    Speech defect    Other headache syndrome       PAST MEDICAL HISTORY:  Past Medical History:   Diagnosis Date    Arthritis     [facility]     HNP (herniated nucleus pulposus), lumbar     Hyperlipidemia     Hypertension     IBS (irritable bowel syndrome)     Osteoarthritis        PAST SURGICAL HISTORY:  Past Surgical History:   Procedure Laterality Date    PARATHYROID GLAND SURGERY      resolved 05/12    [place] ESOPHAGOGASTRODUODENOSCOPY TRANSORAL DIAGNOSTIC N/A 4/19/2017    Procedure: EGD AND COLONOSCOPY;  Surgeon: Aleah Garcia MD;  Location: [facility];   Service: Gastroenterology    UMBILICAL HERNIA REPAIR         FAMILY HISTORY:  Family History   Problem Relation Age of Onset    Breast cancer Mother     Heart defect Brother     Arthritis Family     Hypertension Family     No Known Problems Father     No Known Problems Brother        SOCIAL HISTORY:  Social History     Socioeconomic History    Marital status: /Civil Union     Spouse name: Not on file    Number of children: 2    Years of education: Not on file    Highest education level: Not on file   Occupational History    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   Social Needs    Financial resource strain: Not on file    Food insecurity:     Worry: Not on file     Inability: Not on file   [employer] needs:     Medical: Not on file     Non-medical: Not on file   Tobacco Use    Smoking status: Never Smoker    Smokeless tobacco: Never Used   Substance and Sexual Activity    Alcohol use: No    Drug use: No    Sexual activity: Yes     Partners: Male   Lifestyle    Physical activity:     Days per week: Not on file     Minutes per session: Not on file    Stress: Not on file   Relationships    Social connections:     Talks on phone: Not on file     Gets together: Not on file     Attends Mormonism service: Not on file     Active member of club or organization: Not on file     Attends meetings of clubs or organizations: Not on file     Relationship status: Not on file    Intimate partner violence:     Fear of current or ex partner: Not on file     Emotionally abused: Not on file     Physically abused: Not on file     Forced sexual activity: Not on file   Other Topics Concern    Not on file   Social History Narrative    Not on file       Review of Systems   Respiratory: Negative for shortness of breath  Cardiovascular: Negative for chest pain  Gastrointestinal: Negative for abdominal pain  Musculoskeletal: Negative for back pain  Objective:  Vitals:    07/09/20 1430   BP: 118/72   BP Location: Left arm   Patient Position: Sitting   Cuff Size: Adult   Pulse: 90   Temp: 98 1 °F (36 7 °C)   TempSrc: Tympanic   SpO2: 98%   Weight: 86 6 kg (191 lb)   Height: 5' 5" (1 651 m)     Body mass index is 31 78 kg/m²  Physical Exam   Constitutional: She is oriented to person, place, and time  She appears well-developed and well-nourished  Cardiovascular: Normal rate, regular rhythm and normal heart sounds  Pulmonary/Chest: Effort normal and breath sounds normal    Abdominal: Soft  There is no tenderness  Musculoskeletal:   Varicose vein, left posterior calf which has been bothering her  Neurological: She is alert and oriented to person, place, and time  Nursing note and vitals reviewed          RESULTS:    Recent Results (from the past 1008 hour(s))   Comprehensive metabolic panel    Collection Time: 07/08/20 10:56 AM   Result Value Ref Range    Sodium 139 136 - 145 mmol/L    Potassium 3 9 3 5 - 5 3 mmol/L    Chloride 107 100 - 108 mmol/L    CO2 27 21 - 32 mmol/L    ANION GAP 5 4 - 13 mmol/L    BUN 24 5 - 25 mg/dL    Creatinine 1 17 0 60 - 1 30 mg/dL    Glucose, Fasting 112 (H) 65 - 99 mg/dL    Calcium 10 1 8 3 - 10 1 mg/dL    AST 13 5 - 45 U/L    ALT 18 12 - 78 U/L    Alkaline Phosphatase 94 46 - 116 U/L    Total Protein 7 8 6 4 - 8 2 g/dL    Albumin 3 5 3 5 - 5 0 g/dL    Total Bilirubin 0 44 0 20 - 1 00 mg/dL    eGFR 53 ml/min/1 73sq m   CBC and differential    Collection Time: 07/08/20 10:56 AM   Result Value Ref Range    WBC 3 37 (L) 4 31 - 10 16 Thousand/uL    RBC 4 63 3 81 - 5 12 Million/uL    Hemoglobin 13 4 11 5 - 15 4 g/dL    Hematocrit 42 7 34 8 - 46 1 %    MCV 92 82 - 98 fL    MCH 28 9 26 8 - 34 3 pg    MCHC 31 4 31 4 - 37 4 g/dL    RDW 13 3 11 6 - 15 1 % MPV 10 0 8 9 - 12 7 fL    Platelets 358 621 - 046 Thousands/uL    nRBC 0 /100 WBCs    Neutrophils Relative 43 43 - 75 %    Immat GRANS % 0 0 - 2 %    Lymphocytes Relative 40 14 - 44 %    Monocytes Relative 14 (H) 4 - 12 %    Eosinophils Relative 2 0 - 6 %    Basophils Relative 1 0 - 1 %    Neutrophils Absolute 1 45 (L) 1 85 - 7 62 Thousands/µL    Immature Grans Absolute 0 00 0 00 - 0 20 Thousand/uL    Lymphocytes Absolute 1 34 0 60 - 4 47 Thousands/µL    Monocytes Absolute 0 48 0 17 - 1 22 Thousand/µL    Eosinophils Absolute 0 08 0 00 - 0 61 Thousand/µL    Basophils Absolute 0 02 0 00 - 0 10 Thousands/µL   Hemoglobin A1C    Collection Time: 07/08/20 10:56 AM   Result Value Ref Range    Hemoglobin A1C 6 5 (H) Normal 3 8-5 6%; PreDiabetic 5 7-6 4%; Diabetic >=6 5%; Glycemic control for adults with diabetes <7 0% %     mg/dl   Lipid panel    Collection Time: 07/08/20 10:56 AM   Result Value Ref Range    Cholesterol 238 (H) 50 - 200 mg/dL    Triglycerides 121 <=150 mg/dL    HDL, Direct 55 >=40 mg/dL    LDL Calculated 159 (H) 0 - 100 mg/dL    Non-HDL-Chol (CHOL-HDL) 183 mg/dl       This note was created with voice recognition software  Phonic, grammatical and spelling errors may be present within the note as a result

## 2020-07-09 NOTE — PATIENT INSTRUCTIONS
Medicare Preventive Visit Patient Instructions  Thank you for completing your Welcome to Medicare Visit or Medicare Annual Wellness Visit today  Your next wellness visit will be due in one year (7/9/2021)  The screening/preventive services that you may require over the next 5-10 years are detailed below  Some tests may not apply to you based off risk factors and/or age  Screening tests ordered at today's visit but not completed yet may show as past due  Also, please note that scanned in results may not display below  Preventive Screenings:  Service Recommendations Previous Testing/Comments   Colorectal Cancer Screening  * Colonoscopy    * Fecal Occult Blood Test (FOBT)/Fecal Immunochemical Test (FIT)  * Fecal DNA/Cologuard Test  * Flexible Sigmoidoscopy Age: 54-65 years old   Colonoscopy: every 10 years (may be performed more frequently if at higher risk)  OR  FOBT/FIT: every 1 year  OR  Cologuard: every 3 years  OR  Sigmoidoscopy: every 5 years  Screening may be recommended earlier than age 48 if at higher risk for colorectal cancer  Also, an individualized decision between you and your healthcare provider will decide whether screening between the ages of 74-80 would be appropriate  Colonoscopy: 04/19/2017  FOBT/FIT: Not on file  Cologuard: Not on file  Sigmoidoscopy: Not on file    Screening Current     Breast Cancer Screening Age: 36 years old  Frequency: every 1-2 years  Not required if history of left and right mastectomy Mammogram: 05/11/2020    Screening Current   Cervical Cancer Screening Between the ages of 21-29, pap smear recommended once every 3 years  Between the ages of 33-67, can perform pap smear with HPV co-testing every 5 years     Recommendations may differ for women with a history of total hysterectomy, cervical cancer, or abnormal pap smears in past  Pap Smear: Not on file    Screening Not Indicated   Hepatitis C Screening Once for adults born between 1945 and 1965  More frequently in patients at high risk for Hepatitis C Hep C Antibody: Not on file       Diabetes Screening 1-2 times per year if you're at risk for diabetes or have pre-diabetes Fasting glucose: 112 mg/dL   A1C: 6 5 %    Screening Not Indicated  History Diabetes   Cholesterol Screening Once every 5 years if you don't have a lipid disorder  May order more often based on risk factors  Lipid panel: 07/08/2020    Screening Not Indicated  History Lipid Disorder     Other Preventive Screenings Covered by Medicare:  1  Abdominal Aortic Aneurysm (AAA) Screening: covered once if your at risk  You're considered to be at risk if you have a family history of AAA  2  Lung Cancer Screening: covers low dose CT scan once per year if you meet all of the following conditions: (1) Age 50-69; (2) No signs or symptoms of lung cancer; (3) Current smoker or have quit smoking within the last 15 years; (4) You have a tobacco smoking history of at least 30 pack years (packs per day multiplied by number of years you smoked); (5) You get a written order from a healthcare provider  3  Glaucoma Screening: covered annually if you're considered high risk: (1) You have diabetes OR (2) Family history of glaucoma OR (3)  aged 48 and older OR (3)  American aged 72 and older  3  Osteoporosis Screening: covered every 2 years if you meet one of the following conditions: (1) You're estrogen deficient and at risk for osteoporosis based off medical history and other findings; (2) Have a vertebral abnormality; (3) On glucocorticoid therapy for more than 3 months; (4) Have primary hyperparathyroidism; (5) On osteoporosis medications and need to assess response to drug therapy  · Last bone density test (DXA Scan): 05/05/2017  5  HIV Screening: covered annually if you're between the age of 12-76  Also covered annually if you are younger than 13 and older than 72 with risk factors for HIV infection   For pregnant patients, it is covered up to 3 times per pregnancy  Immunizations:  Immunization Recommendations   Influenza Vaccine Annual influenza vaccination during flu season is recommended for all persons aged >= 6 months who do not have contraindications   Pneumococcal Vaccine (Prevnar and Pneumovax)  * Prevnar = PCV13  * Pneumovax = PPSV23   Adults 25-60 years old: 1-3 doses may be recommended based on certain risk factors  Adults 72 years old: Prevnar (PCV13) vaccine recommended followed by Pneumovax (PPSV23) vaccine  If already received PPSV23 since turning 65, then PCV13 recommended at least one year after PPSV23 dose  Hepatitis B Vaccine 3 dose series if at intermediate or high risk (ex: diabetes, end stage renal disease, liver disease)   Tetanus (Td) Vaccine - COST NOT COVERED BY MEDICARE PART B Following completion of primary series, a booster dose should be given every 10 years to maintain immunity against tetanus  Td may also be given as tetanus wound prophylaxis  Tdap Vaccine - COST NOT COVERED BY MEDICARE PART B Recommended at least once for all adults  For pregnant patients, recommended with each pregnancy  Shingles Vaccine (Shingrix) - COST NOT COVERED BY MEDICARE PART B  2 shot series recommended in those aged 48 and above     Health Maintenance Due:      Topic Date Due    Hepatitis C Screening  1945    MAMMOGRAM  05/11/2021    CRC Screening: Colonoscopy  04/19/2022     Immunizations Due:      Topic Date Due    DTaP,Tdap,and Td Vaccines (1 - Tdap) 11/28/1956    Influenza Vaccine  07/01/2020     Advance Directives   What are advance directives? Advance directives are legal documents that state your wishes and plans for medical care  These plans are made ahead of time in case you lose your ability to make decisions for yourself  Advance directives can apply to any medical decision, such as the treatments you want, and if you want to donate organs  What are the types of advance directives?   There are many types of advance directives, and each state has rules about how to use them  You may choose a combination of any of the following:  · Living will: This is a written record of the treatment you want  You can also choose which treatments you do not want, which to limit, and which to stop at a certain time  This includes surgery, medicine, IV fluid, and tube feedings  · Durable power of  for healthcare [facility]): This is a written record that states who you want to make healthcare choices for you when you are unable to make them for yourself  This person, called a proxy, is usually a family member or a friend  You may choose more than 1 proxy  · Do not resuscitate (DNR) order:  A DNR order is used in case your heart stops beating or you stop breathing  It is a request not to have certain forms of treatment, such as CPR  A DNR order may be included in other types of advance directives  · Medical directive: This covers the care that you want if you are in a coma, near death, or unable to make decisions for yourself  You can list the treatments you want for each condition  Treatment may include pain medicine, surgery, blood transfusions, dialysis, IV or tube feedings, and a ventilator (breathing machine)  · Values history: This document has questions about your views, beliefs, and how you feel and think about life  This information can help others choose the care that you would choose  Why are advance directives important? An advance directive helps you control your care  Although spoken wishes may be used, it is better to have your wishes written down  Spoken wishes can be misunderstood, or not followed  Treatments may be given even if you do not want them  An advance directive may make it easier for your family to make difficult choices about your care     Weight Management   Why it is important to manage your weight:  Being overweight increases your risk of health conditions such as heart disease, high blood pressure, type 2 diabetes, and certain types of cancer  It can also increase your risk for osteoarthritis, sleep apnea, and other respiratory problems  Aim for a slow, steady weight loss  Even a small amount of weight loss can lower your risk of health problems  How to lose weight safely:  A safe and healthy way to lose weight is to eat fewer calories and get regular exercise  You can lose up about 1 pound a week by decreasing the number of calories you eat by 500 calories each day  Healthy meal plan for weight management:  A healthy meal plan includes a variety of foods, contains fewer calories, and helps you stay healthy  A healthy meal plan includes the following:  · Eat whole-grain foods more often  A healthy meal plan should contain fiber  Fiber is the part of grains, fruits, and vegetables that is not broken down by your body  Whole-grain foods are healthy and provide extra fiber in your diet  Some examples of whole-grain foods are whole-wheat breads and pastas, oatmeal, brown rice, and bulgur  · Eat a variety of vegetables every day  Include dark, leafy greens such as spinach, kale, jackie greens, and mustard greens  Eat yellow and orange vegetables such as carrots, sweet potatoes, and winter squash  · Eat a variety of fruits every day  Choose fresh or canned fruit (canned in its own juice or light syrup) instead of juice  Fruit juice has very little or no fiber  · Eat low-fat dairy foods  Drink fat-free (skim) milk or 1% milk  Eat fat-free yogurt and low-fat cottage cheese  Try low-fat cheeses such as mozzarella and other reduced-fat cheeses  · Choose meat and other protein foods that are low in fat  Choose beans or other legumes such as split peas or lentils  Choose fish, skinless poultry (chicken or turkey), or lean cuts of red meat (beef or pork)  Before you cook meat or poultry, cut off any visible fat  · Use less fat and oil  Try baking foods instead of frying them   Add less fat, such as margarine, sour cream, regular salad dressing and mayonnaise to foods  Eat fewer high-fat foods  Some examples of high-fat foods include french fries, doughnuts, ice cream, and cakes  · Eat fewer sweets  Limit foods and drinks that are high in sugar  This includes candy, cookies, regular soda, and sweetened drinks  Exercise:  Exercise at least 30 minutes per day on most days of the week  Some examples of exercise include walking, biking, dancing, and swimming  You can also fit in more physical activity by taking the stairs instead of the elevator or parking farther away from stores  Ask your healthcare provider about the best exercise plan for you  © Copyright [employer] 2018 Information is for End User's use only and may not be sold, redistributed or otherwise used for commercial purposes  All illustrations and images included in CareNotes® are the copyrighted property of [employer]  or [facility] Preventive Visit Patient Instructions  Thank you for completing your Welcome to Medicare Visit or Medicare Annual Wellness Visit today  Your next wellness visit will be due in one year (7/9/2021)  The screening/preventive services that you may require over the next 5-10 years are detailed below  Some tests may not apply to you based off risk factors and/or age  Screening tests ordered at today's visit but not completed yet may show as past due  Also, please note that scanned in results may not display below    Preventive Screenings:  Service Recommendations Previous Testing/Comments   Colorectal Cancer Screening  * Colonoscopy    * Fecal Occult Blood Test (FOBT)/Fecal Immunochemical Test (FIT)  * Fecal DNA/Cologuard Test  * Flexible Sigmoidoscopy Age: 54-65 years old   Colonoscopy: every 10 years (may be performed more frequently if at higher risk)  OR  FOBT/FIT: every 1 year  OR  Cologuard: every 3 years  OR  Sigmoidoscopy: every 5 years  Screening may be recommended earlier than age 48 if at higher risk for colorectal cancer  Also, an individualized decision between you and your healthcare provider will decide whether screening between the ages of 74-80 would be appropriate  Colonoscopy: 04/19/2017  FOBT/FIT: Not on file  Cologuard: Not on file  Sigmoidoscopy: Not on file    Screening Current     Breast Cancer Screening Age: 36 years old  Frequency: every 1-2 years  Not required if history of left and right mastectomy Mammogram: 05/11/2020    Screening Current   Cervical Cancer Screening Between the ages of 21-29, pap smear recommended once every 3 years  Between the ages of 33-67, can perform pap smear with HPV co-testing every 5 years  Recommendations may differ for women with a history of total hysterectomy, cervical cancer, or abnormal pap smears in past  Pap Smear: Not on file    Screening Not Indicated   Hepatitis C Screening Once for adults born between 1945 and 1965  More frequently in patients at high risk for Hepatitis C Hep C Antibody: Not on file    Screening Not Indicated   Diabetes Screening 1-2 times per year if you're at risk for diabetes or have pre-diabetes Fasting glucose: 112 mg/dL   A1C: 6 5 %    Screening Not Indicated  History Diabetes   Cholesterol Screening Once every 5 years if you don't have a lipid disorder  May order more often based on risk factors  Lipid panel: 07/08/2020    Screening Not Indicated  History Lipid Disorder     Other Preventive Screenings Covered by Medicare:  6  Abdominal Aortic Aneurysm (AAA) Screening: covered once if your at risk  You're considered to be at risk if you have a family history of AAA    7  Lung Cancer Screening: covers low dose CT scan once per year if you meet all of the following conditions: (1) Age 50-69; (2) No signs or symptoms of lung cancer; (3) Current smoker or have quit smoking within the last 15 years; (4) You have a tobacco smoking history of at least 30 pack years (packs per day multiplied by number of years you smoked); (5) You get a written order from a healthcare provider  8  Glaucoma Screening: covered annually if you're considered high risk: (1) You have diabetes OR (2) Family history of glaucoma OR (3)  aged 48 and older OR (3)  American aged 72 and older  5  Osteoporosis Screening: covered every 2 years if you meet one of the following conditions: (1) You're estrogen deficient and at risk for osteoporosis based off medical history and other findings; (2) Have a vertebral abnormality; (3) On glucocorticoid therapy for more than 3 months; (4) Have primary hyperparathyroidism; (5) On osteoporosis medications and need to assess response to drug therapy  · Last bone density test (DXA Scan): 05/05/2017   10  HIV Screening: covered annually if you're between the age of 15-65  Also covered annually if you are younger than 13 and older than 72 with risk factors for HIV infection  For pregnant patients, it is covered up to 3 times per pregnancy  Immunizations:  Immunization Recommendations   Influenza Vaccine Annual influenza vaccination during flu season is recommended for all persons aged >= 6 months who do not have contraindications   Pneumococcal Vaccine (Prevnar and Pneumovax)  * Prevnar = PCV13  * Pneumovax = PPSV23   Adults 25-60 years old: 1-3 doses may be recommended based on certain risk factors  Adults 72 years old: Prevnar (PCV13) vaccine recommended followed by Pneumovax (PPSV23) vaccine  If already received PPSV23 since turning 65, then PCV13 recommended at least one year after PPSV23 dose  Hepatitis B Vaccine 3 dose series if at intermediate or high risk (ex: diabetes, end stage renal disease, liver disease)   Tetanus (Td) Vaccine - COST NOT COVERED BY MEDICARE PART B Following completion of primary series, a booster dose should be given every 10 years to maintain immunity against tetanus  Td may also be given as tetanus wound prophylaxis     Tdap Vaccine - COST NOT COVERED BY MEDICARE PART B Recommended at least once for all adults  For pregnant patients, recommended with each pregnancy  Shingles Vaccine (Shingrix) - COST NOT COVERED BY MEDICARE PART B  2 shot series recommended in those aged 48 and above     Health Maintenance Due:      Topic Date Due    Hepatitis C Screening  1945    MAMMOGRAM  05/11/2021    CRC Screening: Colonoscopy  04/19/2022     Immunizations Due:      Topic Date Due    DTaP,Tdap,and Td Vaccines (1 - Tdap) 11/28/1956    Influenza Vaccine  07/01/2020     Advance Directives   What are advance directives? Advance directives are legal documents that state your wishes and plans for medical care  These plans are made ahead of time in case you lose your ability to make decisions for yourself  Advance directives can apply to any medical decision, such as the treatments you want, and if you want to donate organs  What are the types of advance directives? There are many types of advance directives, and each state has rules about how to use them  You may choose a combination of any of the following:  · Living will: This is a written record of the treatment you want  You can also choose which treatments you do not want, which to limit, and which to stop at a certain time  This includes surgery, medicine, IV fluid, and tube feedings  · Durable power of  for healthcare [place] [facility]): This is a written record that states who you want to make healthcare choices for you when you are unable to make them for yourself  This person, called a proxy, is usually a family member or a friend  You may choose more than 1 proxy  · Do not resuscitate (DNR) order:  A DNR order is used in case your heart stops beating or you stop breathing  It is a request not to have certain forms of treatment, such as CPR  A DNR order may be included in other types of advance directives  · Medical directive:   This covers the care that you want if you are in a coma, near death, or unable to make decisions for yourself  You can list the treatments you want for each condition  Treatment may include pain medicine, surgery, blood transfusions, dialysis, IV or tube feedings, and a ventilator (breathing machine)  · Values history: This document has questions about your views, beliefs, and how you feel and think about life  This information can help others choose the care that you would choose  Why are advance directives important? An advance directive helps you control your care  Although spoken wishes may be used, it is better to have your wishes written down  Spoken wishes can be misunderstood, or not followed  Treatments may be given even if you do not want them  An advance directive may make it easier for your family to make difficult choices about your care  Weight Management   Why it is important to manage your weight:  Being overweight increases your risk of health conditions such as heart disease, high blood pressure, type 2 diabetes, and certain types of cancer  It can also increase your risk for osteoarthritis, sleep apnea, and other respiratory problems  Aim for a slow, steady weight loss  Even a small amount of weight loss can lower your risk of health problems  How to lose weight safely:  A safe and healthy way to lose weight is to eat fewer calories and get regular exercise  You can lose up about 1 pound a week by decreasing the number of calories you eat by 500 calories each day  Healthy meal plan for weight management:  A healthy meal plan includes a variety of foods, contains fewer calories, and helps you stay healthy  A healthy meal plan includes the following:  · Eat whole-grain foods more often  A healthy meal plan should contain fiber  Fiber is the part of grains, fruits, and vegetables that is not broken down by your body  Whole-grain foods are healthy and provide extra fiber in your diet   Some examples of whole-grain foods are whole-wheat breads and pastas, oatmeal, brown rice, and bulgur  · Eat a variety of vegetables every day  Include dark, leafy greens such as spinach, kale, jackie greens, and mustard greens  Eat yellow and orange vegetables such as carrots, sweet potatoes, and winter squash  · Eat a variety of fruits every day  Choose fresh or canned fruit (canned in its own juice or light syrup) instead of juice  Fruit juice has very little or no fiber  · Eat low-fat dairy foods  Drink fat-free (skim) milk or 1% milk  Eat fat-free yogurt and low-fat cottage cheese  Try low-fat cheeses such as mozzarella and other reduced-fat cheeses  · Choose meat and other protein foods that are low in fat  Choose beans or other legumes such as split peas or lentils  Choose fish, skinless poultry (chicken or turkey), or lean cuts of red meat (beef or pork)  Before you cook meat or poultry, cut off any visible fat  · Use less fat and oil  Try baking foods instead of frying them  Add less fat, such as margarine, sour cream, regular salad dressing and mayonnaise to foods  Eat fewer high-fat foods  Some examples of high-fat foods include french fries, doughnuts, ice cream, and cakes  · Eat fewer sweets  Limit foods and drinks that are high in sugar  This includes candy, cookies, regular soda, and sweetened drinks  Exercise:  Exercise at least 30 minutes per day on most days of the week  Some examples of exercise include walking, biking, dancing, and swimming  You can also fit in more physical activity by taking the stairs instead of the elevator or parking farther away from stores  Ask your healthcare provider about the best exercise plan for you  © Copyright [employer] 2018 Information is for End User's use only and may not be sold, redistributed or otherwise used for commercial purposes   All illustrations and images included in CareNotes® are the copyrighted property of [employer]  or [address] [place] [address] [employer]

## 2020-07-09 NOTE — PROGRESS NOTES
Assessment and Plan:     Problem List Items Addressed This Visit     None        BMI Counseling: Body mass index is 31 78 kg/m²  The BMI is above normal  Nutrition recommendations include encouraging healthy choices of fruits and vegetables  Preventive health issues were discussed with patient, and age appropriate screening tests were ordered as noted in patient's After Visit Summary  Personalized health advice and appropriate referrals for health education or preventive services given if needed, as noted in patient's After Visit Summary  History of Present Illness:     Patient presents for Welcome to Medicare visit  Patient Care Team:  Chao Lama MD as PCP - [address] MD Davion VILLAVICENCIO MD as Endoscopist     Review of Systems:     Review of Systems   Respiratory: Negative for shortness of breath  Cardiovascular: Negative for chest pain  Gastrointestinal: Negative for abdominal pain        Problem List:     Patient Active Problem List   Diagnosis    Allergic rhinitis    Mixed hyperlipidemia    Hypertension, benign    Irritable bowel syndrome without diarrhea    Type 2 diabetes mellitus without complication, without long-term current use of insulin ([facility] [address] [facility]    Screening for malignant neoplasm of breast    Abnormal mammogram    Breast pain, right    Paresthesia of both feet    Bilateral cold feet    Chronic bilateral low back pain    Memory loss    Speech defect    Other headache syndrome      Past Medical and Surgical History:     Past Medical History:   Diagnosis Date    Arthritis     Diabetes mellitus ([address] )     HNP (herniated nucleus pulposus), lumbar     Hyperlipidemia     Hypertension     IBS (irritable bowel syndrome)     Osteoarthritis      Past Surgical History:   Procedure Laterality Date    PARATHYROID GLAND SURGERY      resolved 05/12    [place] ESOPHAGOGASTRODUODENOSCOPY TRANSORAL DIAGNOSTIC N/A 4/19/2017    Procedure: EGD AND COLONOSCOPY; Surgeon: Manley Babinski, MD;  Location: [facility];   Service: Gastroenterology    UMBILICAL HERNIA REPAIR        Family History:     Family History   Problem Relation Age of Onset    Breast cancer Mother     Heart defect Brother     Arthritis Family     Hypertension Family     No Known Problems Father     No Known Problems Brother       Social History:        Social History     Socioeconomic History    Marital status: /Civil Union     Spouse name: Not on file    Number of children: 2    Years of education: Not on file    Highest education level: Not on file   Occupational History    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   Social Needs    Financial resource strain: Not on file    Food insecurity:     Worry: Not on file     Inability: Not on file   [employer] needs:     Medical: Not on file     Non-medical: Not on file   Tobacco Use    Smoking status: Never Smoker    Smokeless tobacco: Never Used   Substance and Sexual Activity    Alcohol use: No    Drug use: No    Sexual activity: Yes     Partners: Male   Lifestyle    Physical activity:     Days per week: Not on file     Minutes per session: Not on file    Stress: Not on file   Relationships    Social connections:     Talks on phone: Not on file     Gets together: Not on file     Attends Denominational service: Not on file     Active member of club or organization: Not on file     Attends meetings of clubs or organizations: Not on file     Relationship status: Not on file    Intimate partner violence:     Fear of current or ex partner: Not on file     Emotionally abused: Not on file     Physically abused: Not on file     Forced sexual activity: Not on file   Other Topics Concern    Not on file   Social History Narrative    Not on file      Medications and Allergies:     Current Outpatient Medications   Medication Sig Dispense Refill    amLODIPine (NORVASC) 5 mg tablet TAKE 1 TABLET BY MOUTH EVERY DAY 90 tablet 1    glucose blood (Accu-Chek Guide) test strip Check sugar daily 50 each 5    hyoscyamine (ANASPAZ,LEVSIN) 0 125 MG tablet TAKE 1 TABLET 3-4 TIMES DAILY AS NEEDED  60 tablet 2    levocetirizine (XYZAL) 5 MG tablet Take 5 mg by mouth as needed       lovastatin (MEVACOR) 10 MG tablet TAKE 1 TABLET BY MOUTH EVERYDAY AT BEDTIME 90 tablet 1    metFORMIN (GLUCOPHAGE) 1000 MG tablet TAKE 1 TABLET TWICE DAILY 180 tablet 2    naproxen (NAPROSYN) 250 mg tablet Take 1 tablet (250 mg total) by mouth 2 (two) times a day with meals 20 tablet 0    quinapril-hydrochlorothiazide (ACCURETIC) 20-25 MG per tablet TAKE 1 TABLET BY MOUTH EVERY DAY 90 tablet 1    aspirin (ECOTRIN LOW STRENGTH) 81 mg EC tablet Take 1 tablet (81 mg total) by mouth daily (Patient not taking: Reported on 1/29/2020)  0     No current facility-administered medications for this visit  Allergies   Allergen Reactions    Nuts Anaphylaxis     Raw Almonds only    Codeine Other (See Comments)     Dizzy, lightheaded      Immunizations:     Immunization History   Administered Date(s) Administered    INFLUENZA 12/15/2010    Influenza Split High Dose Preservative Free IM 12/05/2017    Influenza, high dose seasonal 0 5 mL 10/05/2018, 09/23/2019    Pneumococcal Conjugate 13-Valent 12/05/2017    Pneumococcal Polysaccharide PPV23 12/15/2010, 05/14/2019      Health Maintenance:         Topic Date Due    Hepatitis C Screening  1945    MAMMOGRAM  05/11/2021    CRC Screening: Colonoscopy  04/19/2022         Topic Date Due    DTaP,Tdap,and Td Vaccines (1 - Tdap) 11/28/1956    Influenza Vaccine  07/01/2020      Medicare Screening Tests and Risk Assessments:     Luisa Cowan is here for her Subsequent Wellness visit  Health Risk Assessment:   Patient rates overall health as good  Patient feels that their physical health rating is same  Eyesight was rated as slightly worse  Hearing was rated as same   Patient feels that their emotional and mental health rating is same  Pain experienced in the last 7 days has been none  Patient states that she has experienced no weight loss or gain in last 6 months  Depression Screening:   PHQ-2 Score: 0      Fall Risk Screening: In the past year, patient has experienced: no history of falling in past year      Urinary Incontinence Screening:   Patient has not leaked urine accidently in the last six months  Home Safety:  Patient does not have trouble with stairs inside or outside of their home  Patient has working smoke alarms and has working carbon monoxide detector  Home safety hazards include: none  Nutrition:   Current diet is Regular  Medications:   Patient is not currently taking any over-the-counter supplements  Patient is able to manage medications  Activities of Daily Living (ADLs)/Instrumental Activities of Daily Living (IADLs):   Walk and transfer into and out of bed and chair?: Yes  Dress and groom yourself?: Yes    Bathe or shower yourself?: Yes    Feed yourself? Yes  Do your laundry/housekeeping?: Yes  Manage your money, pay your bills and track your expenses?: Yes  Make your own meals?: Yes    Do your own shopping?: Yes    Previous Hospitalizations:   Any hospitalizations or ED visits within the last 12 months?: No      Advance Care Planning:   Living will: No    Durable POA for healthcare:  Yes    Advanced directive: No    Advanced directive counseling given: Yes      Cognitive Screening:   Provider or family/friend/caregiver concerned regarding cognition?: No    PREVENTIVE SCREENINGS      Cardiovascular Screening:    General: Screening Not Indicated and History Lipid Disorder      Diabetes Screening:     General: Screening Not Indicated and History Diabetes      Colorectal Cancer Screening:     General: Screening Current      Breast Cancer Screening:     General: Screening Current      Cervical Cancer Screening:    General: Screening Not Indicated      Osteoporosis Screening: General: Risks and Benefits Discussed      Abdominal Aortic Aneurysm (AAA) Screening:        General: Screening Not Indicated      Lung Cancer Screening:     General: Screening Not Indicated      Hepatitis C Screening:    General: Screening Not Indicated      Preventive Screening Comments: She would like to wait on the bone density test because of COVID  No exam data present     Physical Exam:     There were no vitals taken for this visit  Physical Exam   Psychiatric: She has a normal mood and affect  Her behavior is normal  Judgment and thought content normal    Nursing note and vitals reviewed        Starla Barrow MD

## 2020-07-13 ENCOUNTER — APPOINTMENT (OUTPATIENT)
Dept: LAB | Facility: HOSPITAL | Age: 75
End: 2020-07-13
Attending: INTERNAL MEDICINE
Payer: MEDICARE

## 2020-07-13 DIAGNOSIS — E11.9 TYPE 2 DIABETES MELLITUS WITHOUT COMPLICATION, WITHOUT LONG-TERM CURRENT USE OF INSULIN (HCC): ICD-10-CM

## 2020-07-13 DIAGNOSIS — R20.2 PARESTHESIA: ICD-10-CM

## 2020-07-13 DIAGNOSIS — M79.10 MYALGIA: ICD-10-CM

## 2020-07-13 LAB
25(OH)D3 SERPL-MCNC: 26 NG/ML (ref 30–100)
T4 FREE SERPL-MCNC: 0.9 NG/DL (ref 0.76–1.46)
TSH SERPL DL<=0.05 MIU/L-ACNC: 1.93 UIU/ML (ref 0.36–3.74)
VIT B12 SERPL-MCNC: 596 PG/ML (ref 100–900)

## 2020-07-13 PROCEDURE — 84443 ASSAY THYROID STIM HORMONE: CPT

## 2020-07-13 PROCEDURE — 82607 VITAMIN B-12: CPT

## 2020-07-13 PROCEDURE — 36415 COLL VENOUS BLD VENIPUNCTURE: CPT

## 2020-07-13 PROCEDURE — 82306 VITAMIN D 25 HYDROXY: CPT

## 2020-07-13 PROCEDURE — 84439 ASSAY OF FREE THYROXINE: CPT

## 2020-07-14 ENCOUNTER — TELEPHONE (OUTPATIENT)
Dept: INTERNAL MEDICINE CLINIC | Facility: CLINIC | Age: 75
End: 2020-07-14

## 2020-07-14 NOTE — TELEPHONE ENCOUNTER
Patient aware     ----- Message from Jessie Perera MD sent at 7/14/2020  8:53 AM EDT -----  Call patient, labs were not too bad  Vitamin-D was just a little low  Make sure she is taking a vitamin D3 supplement at 2000 units daily  She could double that dose for a week or 2 and then stay on the 2000 units    A low vitamin-D can cause fatigue in some patients

## 2020-08-12 DIAGNOSIS — E78.2 MIXED HYPERLIPIDEMIA: ICD-10-CM

## 2020-08-12 RX ORDER — LOVASTATIN 10 MG/1
TABLET ORAL
Qty: 90 TABLET | Refills: 1 | Status: SHIPPED | OUTPATIENT
Start: 2020-08-12 | End: 2021-02-10

## 2020-09-23 DIAGNOSIS — I10 HYPERTENSION, BENIGN: ICD-10-CM

## 2020-09-23 DIAGNOSIS — E11.9 TYPE 2 DIABETES MELLITUS WITHOUT COMPLICATION, WITHOUT LONG-TERM CURRENT USE OF INSULIN (HCC): ICD-10-CM

## 2020-09-23 RX ORDER — QUINAPRIL HCL AND HYDROCHLOROTHIAZIDE 20; 25 MG/1; MG/1
TABLET ORAL
Qty: 90 TABLET | Refills: 1 | Status: SHIPPED | OUTPATIENT
Start: 2020-09-23 | End: 2021-04-05

## 2020-11-09 ENCOUNTER — LAB (OUTPATIENT)
Dept: LAB | Facility: CLINIC | Age: 75
End: 2020-11-09
Payer: MEDICARE

## 2020-11-09 DIAGNOSIS — E11.9 TYPE 2 DIABETES MELLITUS WITHOUT COMPLICATION, WITHOUT LONG-TERM CURRENT USE OF INSULIN (HCC): ICD-10-CM

## 2020-11-09 DIAGNOSIS — E53.8 VITAMIN B12 DEFICIENCY: ICD-10-CM

## 2020-11-09 DIAGNOSIS — E78.2 MIXED HYPERLIPIDEMIA: ICD-10-CM

## 2020-11-09 DIAGNOSIS — R53.83 FATIGUE, UNSPECIFIED TYPE: Primary | ICD-10-CM

## 2020-11-09 DIAGNOSIS — R53.83 FATIGUE, UNSPECIFIED TYPE: ICD-10-CM

## 2020-11-09 DIAGNOSIS — E55.9 VITAMIN D DEFICIENCY: ICD-10-CM

## 2020-11-09 LAB
25(OH)D3 SERPL-MCNC: 44.2 NG/ML (ref 30–100)
ALBUMIN SERPL BCP-MCNC: 3.5 G/DL (ref 3.5–5)
ALP SERPL-CCNC: 97 U/L (ref 46–116)
ALT SERPL W P-5'-P-CCNC: 21 U/L (ref 12–78)
ANION GAP SERPL CALCULATED.3IONS-SCNC: 1 MMOL/L (ref 4–13)
AST SERPL W P-5'-P-CCNC: 16 U/L (ref 5–45)
BASOPHILS # BLD AUTO: 0.03 THOUSANDS/ΜL (ref 0–0.1)
BASOPHILS NFR BLD AUTO: 1 % (ref 0–1)
BILIRUB SERPL-MCNC: 0.47 MG/DL (ref 0.2–1)
BUN SERPL-MCNC: 15 MG/DL (ref 5–25)
CALCIUM SERPL-MCNC: 9.7 MG/DL (ref 8.3–10.1)
CHLORIDE SERPL-SCNC: 111 MMOL/L (ref 100–108)
CHOLEST SERPL-MCNC: 204 MG/DL (ref 50–200)
CO2 SERPL-SCNC: 28 MMOL/L (ref 21–32)
CREAT SERPL-MCNC: 1.05 MG/DL (ref 0.6–1.3)
EOSINOPHIL # BLD AUTO: 0.12 THOUSAND/ΜL (ref 0–0.61)
EOSINOPHIL NFR BLD AUTO: 3 % (ref 0–6)
ERYTHROCYTE [DISTWIDTH] IN BLOOD BY AUTOMATED COUNT: 13.8 % (ref 11.6–15.1)
EST. AVERAGE GLUCOSE BLD GHB EST-MCNC: 148 MG/DL
GFR SERPL CREATININE-BSD FRML MDRD: 60 ML/MIN/1.73SQ M
GLUCOSE P FAST SERPL-MCNC: 97 MG/DL (ref 65–99)
HBA1C MFR BLD: 6.8 %
HCT VFR BLD AUTO: 40.5 % (ref 34.8–46.1)
HDLC SERPL-MCNC: 67 MG/DL
HGB BLD-MCNC: 13 G/DL (ref 11.5–15.4)
IMM GRANULOCYTES # BLD AUTO: 0 THOUSAND/UL (ref 0–0.2)
IMM GRANULOCYTES NFR BLD AUTO: 0 % (ref 0–2)
LDLC SERPL CALC-MCNC: 122 MG/DL (ref 0–100)
LYMPHOCYTES # BLD AUTO: 1.81 THOUSANDS/ΜL (ref 0.6–4.47)
LYMPHOCYTES NFR BLD AUTO: 39 % (ref 14–44)
MCH RBC QN AUTO: 29.2 PG (ref 26.8–34.3)
MCHC RBC AUTO-ENTMCNC: 32.1 G/DL (ref 31.4–37.4)
MCV RBC AUTO: 91 FL (ref 82–98)
MONOCYTES # BLD AUTO: 0.44 THOUSAND/ΜL (ref 0.17–1.22)
MONOCYTES NFR BLD AUTO: 10 % (ref 4–12)
NEUTROPHILS # BLD AUTO: 2.2 THOUSANDS/ΜL (ref 1.85–7.62)
NEUTS SEG NFR BLD AUTO: 47 % (ref 43–75)
NONHDLC SERPL-MCNC: 137 MG/DL
NRBC BLD AUTO-RTO: 0 /100 WBCS
PLATELET # BLD AUTO: 252 THOUSANDS/UL (ref 149–390)
PMV BLD AUTO: 10.4 FL (ref 8.9–12.7)
POTASSIUM SERPL-SCNC: 4.1 MMOL/L (ref 3.5–5.3)
PROT SERPL-MCNC: 7.7 G/DL (ref 6.4–8.2)
RBC # BLD AUTO: 4.45 MILLION/UL (ref 3.81–5.12)
SODIUM SERPL-SCNC: 140 MMOL/L (ref 136–145)
T4 FREE SERPL-MCNC: 1.01 NG/DL (ref 0.76–1.46)
TRIGL SERPL-MCNC: 77 MG/DL
TSH SERPL DL<=0.05 MIU/L-ACNC: 1.61 UIU/ML (ref 0.36–3.74)
VIT B12 SERPL-MCNC: 617 PG/ML (ref 100–900)
WBC # BLD AUTO: 4.6 THOUSAND/UL (ref 4.31–10.16)

## 2020-11-09 PROCEDURE — 82306 VITAMIN D 25 HYDROXY: CPT

## 2020-11-09 PROCEDURE — 83036 HEMOGLOBIN GLYCOSYLATED A1C: CPT

## 2020-11-09 PROCEDURE — 36415 COLL VENOUS BLD VENIPUNCTURE: CPT

## 2020-11-09 PROCEDURE — 84443 ASSAY THYROID STIM HORMONE: CPT

## 2020-11-09 PROCEDURE — 84439 ASSAY OF FREE THYROXINE: CPT

## 2020-11-09 PROCEDURE — 80061 LIPID PANEL: CPT

## 2020-11-09 PROCEDURE — 82607 VITAMIN B-12: CPT

## 2020-11-09 PROCEDURE — 85025 COMPLETE CBC W/AUTO DIFF WBC: CPT

## 2020-11-09 PROCEDURE — 80053 COMPREHEN METABOLIC PANEL: CPT

## 2020-11-10 ENCOUNTER — OFFICE VISIT (OUTPATIENT)
Dept: INTERNAL MEDICINE CLINIC | Facility: CLINIC | Age: 75
End: 2020-11-10
Payer: MEDICARE

## 2020-11-10 VITALS
TEMPERATURE: 97.9 F | SYSTOLIC BLOOD PRESSURE: 112 MMHG | DIASTOLIC BLOOD PRESSURE: 68 MMHG | HEIGHT: 65 IN | RESPIRATION RATE: 16 BRPM | HEART RATE: 84 BPM | BODY MASS INDEX: 31.49 KG/M2 | OXYGEN SATURATION: 99 % | WEIGHT: 189 LBS

## 2020-11-10 DIAGNOSIS — Z23 NEED FOR INFLUENZA VACCINATION: ICD-10-CM

## 2020-11-10 DIAGNOSIS — E11.9 TYPE 2 DIABETES MELLITUS WITHOUT COMPLICATION, WITHOUT LONG-TERM CURRENT USE OF INSULIN (HCC): Primary | ICD-10-CM

## 2020-11-10 DIAGNOSIS — I10 HYPERTENSION, BENIGN: ICD-10-CM

## 2020-11-10 DIAGNOSIS — E78.2 MIXED HYPERLIPIDEMIA: ICD-10-CM

## 2020-11-10 PROCEDURE — G0008 ADMIN INFLUENZA VIRUS VAC: HCPCS | Performed by: INTERNAL MEDICINE

## 2020-11-10 PROCEDURE — 90662 IIV NO PRSV INCREASED AG IM: CPT | Performed by: INTERNAL MEDICINE

## 2020-11-10 PROCEDURE — 99214 OFFICE O/P EST MOD 30 MIN: CPT | Performed by: INTERNAL MEDICINE

## 2020-12-15 DIAGNOSIS — E11.9 TYPE 2 DIABETES MELLITUS WITHOUT COMPLICATION, WITHOUT LONG-TERM CURRENT USE OF INSULIN (HCC): ICD-10-CM

## 2020-12-15 RX ORDER — BLOOD SUGAR DIAGNOSTIC
STRIP MISCELLANEOUS
Qty: 100 EACH | Refills: 2 | Status: SHIPPED | OUTPATIENT
Start: 2020-12-15 | End: 2021-11-02

## 2020-12-28 DIAGNOSIS — I10 HYPERTENSION, BENIGN: ICD-10-CM

## 2020-12-28 RX ORDER — AMLODIPINE BESYLATE 5 MG/1
TABLET ORAL
Qty: 90 TABLET | Refills: 3 | Status: SHIPPED | OUTPATIENT
Start: 2020-12-28 | End: 2022-01-07

## 2021-02-10 DIAGNOSIS — E78.2 MIXED HYPERLIPIDEMIA: ICD-10-CM

## 2021-02-10 RX ORDER — LOVASTATIN 10 MG/1
TABLET ORAL
Qty: 90 TABLET | Refills: 1 | Status: SHIPPED | OUTPATIENT
Start: 2021-02-10 | End: 2021-07-06 | Stop reason: SDUPTHER

## 2021-02-25 DIAGNOSIS — E11.9 TYPE 2 DIABETES MELLITUS WITHOUT COMPLICATION, WITHOUT LONG-TERM CURRENT USE OF INSULIN (HCC): ICD-10-CM

## 2021-04-05 DIAGNOSIS — I10 HYPERTENSION, BENIGN: ICD-10-CM

## 2021-04-05 RX ORDER — QUINAPRIL HCL AND HYDROCHLOROTHIAZIDE 20; 25 MG/1; MG/1
TABLET ORAL
Qty: 90 TABLET | Refills: 1 | Status: SHIPPED | OUTPATIENT
Start: 2021-04-05 | End: 2021-10-04

## 2021-06-01 LAB
LEFT EYE DIABETIC RETINOPATHY: NORMAL
RIGHT EYE DIABETIC RETINOPATHY: NORMAL

## 2021-07-03 ENCOUNTER — APPOINTMENT (OUTPATIENT)
Dept: LAB | Facility: HOSPITAL | Age: 76
End: 2021-07-03
Attending: INTERNAL MEDICINE
Payer: MEDICARE

## 2021-07-03 DIAGNOSIS — E11.9 TYPE 2 DIABETES MELLITUS WITHOUT COMPLICATION, WITHOUT LONG-TERM CURRENT USE OF INSULIN (HCC): ICD-10-CM

## 2021-07-03 LAB
ALBUMIN SERPL BCP-MCNC: 3.2 G/DL (ref 3.5–5)
ALP SERPL-CCNC: 100 U/L (ref 46–116)
ALT SERPL W P-5'-P-CCNC: 21 U/L (ref 12–78)
ANION GAP SERPL CALCULATED.3IONS-SCNC: 9 MMOL/L (ref 4–13)
AST SERPL W P-5'-P-CCNC: 15 U/L (ref 5–45)
BASOPHILS # BLD AUTO: 0.02 THOUSANDS/ΜL (ref 0–0.1)
BASOPHILS NFR BLD AUTO: 1 % (ref 0–1)
BILIRUB SERPL-MCNC: 0.37 MG/DL (ref 0.2–1)
BUN SERPL-MCNC: 20 MG/DL (ref 5–25)
CALCIUM ALBUM COR SERPL-MCNC: 9.9 MG/DL (ref 8.3–10.1)
CALCIUM SERPL-MCNC: 9.3 MG/DL (ref 8.3–10.1)
CHLORIDE SERPL-SCNC: 104 MMOL/L (ref 100–108)
CHOLEST SERPL-MCNC: 195 MG/DL (ref 50–200)
CO2 SERPL-SCNC: 28 MMOL/L (ref 21–32)
CREAT SERPL-MCNC: 1.26 MG/DL (ref 0.6–1.3)
EOSINOPHIL # BLD AUTO: 0.13 THOUSAND/ΜL (ref 0–0.61)
EOSINOPHIL NFR BLD AUTO: 3 % (ref 0–6)
ERYTHROCYTE [DISTWIDTH] IN BLOOD BY AUTOMATED COUNT: 13.6 % (ref 11.6–15.1)
GFR SERPL CREATININE-BSD FRML MDRD: 48 ML/MIN/1.73SQ M
GLUCOSE P FAST SERPL-MCNC: 142 MG/DL (ref 65–99)
HCT VFR BLD AUTO: 41.2 % (ref 34.8–46.1)
HDLC SERPL-MCNC: 56 MG/DL
HGB BLD-MCNC: 12.9 G/DL (ref 11.5–15.4)
IMM GRANULOCYTES # BLD AUTO: 0.01 THOUSAND/UL (ref 0–0.2)
IMM GRANULOCYTES NFR BLD AUTO: 0 % (ref 0–2)
LDLC SERPL CALC-MCNC: 122 MG/DL (ref 0–100)
LYMPHOCYTES # BLD AUTO: 1.49 THOUSANDS/ΜL (ref 0.6–4.47)
LYMPHOCYTES NFR BLD AUTO: 38 % (ref 14–44)
MCH RBC QN AUTO: 28.2 PG (ref 26.8–34.3)
MCHC RBC AUTO-ENTMCNC: 31.3 G/DL (ref 31.4–37.4)
MCV RBC AUTO: 90 FL (ref 82–98)
MONOCYTES # BLD AUTO: 0.51 THOUSAND/ΜL (ref 0.17–1.22)
MONOCYTES NFR BLD AUTO: 13 % (ref 4–12)
NEUTROPHILS # BLD AUTO: 1.8 THOUSANDS/ΜL (ref 1.85–7.62)
NEUTS SEG NFR BLD AUTO: 45 % (ref 43–75)
NONHDLC SERPL-MCNC: 139 MG/DL
NRBC BLD AUTO-RTO: 0 /100 WBCS
PLATELET # BLD AUTO: 259 THOUSANDS/UL (ref 149–390)
PMV BLD AUTO: 10 FL (ref 8.9–12.7)
POTASSIUM SERPL-SCNC: 3.9 MMOL/L (ref 3.5–5.3)
PROT SERPL-MCNC: 7.4 G/DL (ref 6.4–8.2)
RBC # BLD AUTO: 4.57 MILLION/UL (ref 3.81–5.12)
SODIUM SERPL-SCNC: 141 MMOL/L (ref 136–145)
TRIGL SERPL-MCNC: 85 MG/DL
WBC # BLD AUTO: 3.96 THOUSAND/UL (ref 4.31–10.16)

## 2021-07-03 PROCEDURE — 80053 COMPREHEN METABOLIC PANEL: CPT

## 2021-07-03 PROCEDURE — 85025 COMPLETE CBC W/AUTO DIFF WBC: CPT

## 2021-07-03 PROCEDURE — 36415 COLL VENOUS BLD VENIPUNCTURE: CPT

## 2021-07-03 PROCEDURE — 80061 LIPID PANEL: CPT

## 2021-07-03 PROCEDURE — 83036 HEMOGLOBIN GLYCOSYLATED A1C: CPT

## 2021-07-04 LAB
EST. AVERAGE GLUCOSE BLD GHB EST-MCNC: 154 MG/DL
HBA1C MFR BLD: 7 %

## 2021-07-06 ENCOUNTER — OFFICE VISIT (OUTPATIENT)
Dept: INTERNAL MEDICINE CLINIC | Facility: CLINIC | Age: 76
End: 2021-07-06
Payer: MEDICARE

## 2021-07-06 VITALS
WEIGHT: 186 LBS | DIASTOLIC BLOOD PRESSURE: 68 MMHG | SYSTOLIC BLOOD PRESSURE: 102 MMHG | TEMPERATURE: 98 F | OXYGEN SATURATION: 92 % | BODY MASS INDEX: 30.99 KG/M2 | HEIGHT: 65 IN | HEART RATE: 68 BPM

## 2021-07-06 DIAGNOSIS — E78.2 MIXED HYPERLIPIDEMIA: ICD-10-CM

## 2021-07-06 DIAGNOSIS — I10 HYPERTENSION, BENIGN: ICD-10-CM

## 2021-07-06 DIAGNOSIS — E11.9 TYPE 2 DIABETES MELLITUS WITHOUT COMPLICATION, WITHOUT LONG-TERM CURRENT USE OF INSULIN (HCC): Primary | ICD-10-CM

## 2021-07-06 PROCEDURE — 99214 OFFICE O/P EST MOD 30 MIN: CPT | Performed by: INTERNAL MEDICINE

## 2021-07-06 RX ORDER — LOVASTATIN 20 MG/1
20 TABLET ORAL
Qty: 90 TABLET | Refills: 3 | Status: SHIPPED | OUTPATIENT
Start: 2021-07-06

## 2021-07-06 NOTE — PROGRESS NOTES
Assessment/Plan:       Numbers are okay except for the cholesterol  Will increase her medication and then repeat labs in 6 weeks  Otherwise, no other medication changes  Continue diet and exercise  Quality Measures:       Return in about 4 months (around 11/6/2021)  No problem-specific Assessment & Plan notes found for this encounter  Diagnoses and all orders for this visit:    Type 2 diabetes mellitus without complication, without long-term current use of insulin (HCC)    Hypertension, benign    Mixed hyperlipidemia  -     lovastatin (MEVACOR) 20 mg tablet; Take 1 tablet (20 mg total) by mouth daily at bedtime  -     Lipid panel; Future  -     ALT; Future  -     AST; Future          Subjective:      Patient ID: Moses London is a 76 y o  female  Patient comes in today for routine follow-up with her   She states she is doing okay  Her sugars went up slightly  She admits that the pandemic has been getting to her with diet  Her cholesterol is down but still not totally controlled  She is taking her medicine as directed and not forgetting  Blood pressure remains well controlled  Denies any new complaints today  No further additions to her history        ALLERGIES:  Allergies   Allergen Reactions    Nuts - Food Allergy Anaphylaxis     Raw Almonds only    Codeine Other (See Comments)     Dizzy, lightheaded       CURRENT MEDICATIONS:    Current Outpatient Medications:     Accu-Chek Guide test strip, CHECK SUGAR DAILY, Disp: 100 each, Rfl: 2    amLODIPine (NORVASC) 5 mg tablet, TAKE 1 TABLET BY MOUTH EVERY DAY, Disp: 90 tablet, Rfl: 3    hyoscyamine (ANASPAZ,LEVSIN) 0 125 MG tablet, TAKE 1 TABLET 3-4 TIMES DAILY AS NEEDED , Disp: 60 tablet, Rfl: 2    levocetirizine (XYZAL) 5 MG tablet, Take 5 mg by mouth as needed , Disp: , Rfl:     lovastatin (MEVACOR) 20 mg tablet, Take 1 tablet (20 mg total) by mouth daily at bedtime, Disp: 90 tablet, Rfl: 3    metFORMIN (GLUCOPHAGE) 1000 MG tablet, Take 1 tablet (1,000 mg total) by mouth 2 (two) times a day, Disp: 180 tablet, Rfl: 3    naproxen (NAPROSYN) 250 mg tablet, Take 1 tablet (250 mg total) by mouth 2 (two) times a day with meals, Disp: 20 tablet, Rfl: 0    quinapril-hydrochlorothiazide (ACCURETIC) 20-25 MG per tablet, TAKE 1 TABLET BY MOUTH EVERY DAY, Disp: 90 tablet, Rfl: 1    aspirin (ECOTRIN LOW STRENGTH) 81 mg EC tablet, Take 1 tablet (81 mg total) by mouth daily (Patient not taking: Reported on 11/10/2020), Disp: , Rfl: 0    ACTIVE PROBLEM LIST:  Patient Active Problem List   Diagnosis    Allergic rhinitis    Mixed hyperlipidemia    Hypertension, benign    Irritable bowel syndrome without diarrhea    Type 2 diabetes mellitus without complication, without long-term current use of insulin ([facility])    Screening for malignant neoplasm of breast    Abnormal mammogram    Breast pain, right    Paresthesia of both feet    Bilateral cold feet    Chronic bilateral low back pain    Memory loss    Speech defect    Other headache syndrome       PAST MEDICAL HISTORY:  Past Medical History:   Diagnosis Date    Arthritis     Diabetes mellitus ([address] )     HNP (herniated nucleus pulposus), lumbar     Hyperlipidemia     Hypertension     IBS (irritable bowel syndrome)     Osteoarthritis        PAST SURGICAL HISTORY:  Past Surgical History:   Procedure Laterality Date    PARATHYROID GLAND SURGERY      resolved 05/12    [place] ESOPHAGOGASTRODUODENOSCOPY TRANSORAL DIAGNOSTIC N/A 4/19/2017    Procedure: EGD AND COLONOSCOPY;  Surgeon: Zahra Lawrence MD;  Location: [facility];   Service: Gastroenterology    UMBILICAL HERNIA REPAIR         FAMILY HISTORY:  Family History   Problem Relation Age of Onset    Breast cancer Mother     Heart defect Brother     Arthritis Family     Hypertension Family     No Known Problems Father     No Known Problems Brother        SOCIAL HISTORY:  Social History     Socioeconomic History    Marital status: /Civil [employer]     Spouse name: Not on file    Number of children: 2    Years of education: Not on file    Highest education level: Not on file   Occupational History    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   Tobacco Use    Smoking status: Never Smoker    Smokeless tobacco: Never Used   Substance and Sexual Activity    Alcohol use: No    Drug use: No    Sexual activity: Yes     Partners: Male   Other Topics Concern    Not on file   Social History Narrative    Not on file     Social Determinants of Health     Financial Resource Strain:     Difficulty of Paying Living Expenses:    Food Insecurity:     Worried About [address] in the Last Year:     [address] [employer] in the Last Year:    Transportation Needs:     Lack of Transportation (Medical):  Lack of Transportation (Non-Medical):    Physical Activity:     Days of Exercise per Week:     Minutes of Exercise per Session:    Stress:     Feeling of Stress :    Social Connections:     Frequency of Communication with Friends and Family:     Frequency of Social Gatherings with Friends and Family:     Attends Rastafarian Services:     Active Member of Clubs or Organizations:     Attends Club or Organization Meetings:     Marital Status:    Intimate Partner Violence:     Fear of Current or Ex-Partner:     Emotionally Abused:     Physically Abused:     Sexually Abused:        Review of Systems   Respiratory: Negative for shortness of breath  Cardiovascular: Negative for chest pain  Gastrointestinal: Negative for abdominal pain  Objective:  Vitals:    07/06/21 0817   BP: 102/68   BP Location: Left arm   Patient Position: Sitting   Cuff Size: Adult   Pulse: 68   Temp: 98 °F (36 7 °C)   TempSrc: Temporal   SpO2: 92%   Weight: 84 4 kg (186 lb)   Height: 5' 5" (1 651 m)     Body mass index is 30 95 kg/m²  Physical Exam  Vitals and nursing note reviewed     Constitutional:       Appearance: She is well-developed  Cardiovascular:      Rate and Rhythm: Normal rate and regular rhythm  Heart sounds: Normal heart sounds  Pulmonary:      Effort: Pulmonary effort is normal       Breath sounds: Normal breath sounds  Abdominal:      Palpations: Abdomen is soft  Tenderness: There is no abdominal tenderness  Neurological:      Mental Status: She is alert and oriented to person, place, and time             RESULTS:    Recent Results (from the past 1008 hour(s))   Comprehensive metabolic panel    Collection Time: 07/03/21  9:18 AM   Result Value Ref Range    Sodium 141 136 - 145 mmol/L    Potassium 3 9 3 5 - 5 3 mmol/L    Chloride 104 100 - 108 mmol/L    CO2 28 21 - 32 mmol/L    ANION GAP 9 4 - 13 mmol/L    BUN 20 5 - 25 mg/dL    Creatinine 1 26 0 60 - 1 30 mg/dL    Glucose, Fasting 142 (H) 65 - 99 mg/dL    Calcium 9 3 8 3 - 10 1 mg/dL    Corrected Calcium 9 9 8 3 - 10 1 mg/dL    AST 15 5 - 45 U/L    ALT 21 12 - 78 U/L    Alkaline Phosphatase 100 46 - 116 U/L    Total Protein 7 4 6 4 - 8 2 g/dL    Albumin 3 2 (L) 3 5 - 5 0 g/dL    Total Bilirubin 0 37 0 20 - 1 00 mg/dL    eGFR 48 ml/min/1 73sq m   CBC and differential    Collection Time: 07/03/21  9:18 AM   Result Value Ref Range    WBC 3 96 (L) 4 31 - 10 16 Thousand/uL    RBC 4 57 3 81 - 5 12 Million/uL    Hemoglobin 12 9 11 5 - 15 4 g/dL    Hematocrit 41 2 34 8 - 46 1 %    MCV 90 82 - 98 fL    MCH 28 2 26 8 - 34 3 pg    MCHC 31 3 (L) 31 4 - 37 4 g/dL    RDW 13 6 11 6 - 15 1 %    MPV 10 0 8 9 - 12 7 fL    Platelets 022 649 - 981 Thousands/uL    nRBC 0 /100 WBCs    Neutrophils Relative 45 43 - 75 %    Immat GRANS % 0 0 - 2 %    Lymphocytes Relative 38 14 - 44 %    Monocytes Relative 13 (H) 4 - 12 %    Eosinophils Relative 3 0 - 6 %    Basophils Relative 1 0 - 1 %    Neutrophils Absolute 1 80 (L) 1 85 - 7 62 Thousands/µL    Immature Grans Absolute 0 01 0 00 - 0 20 Thousand/uL    Lymphocytes Absolute 1 49 0 60 - 4 47 Thousands/µL    Monocytes Absolute 0  51 0 17 - 1 22 Thousand/µL    Eosinophils Absolute 0 13 0 00 - 0 61 Thousand/µL    Basophils Absolute 0 02 0 00 - 0 10 Thousands/µL   Hemoglobin A1C    Collection Time: 07/03/21  9:18 AM   Result Value Ref Range    Hemoglobin A1C 7 0 (H) Normal 3 8-5 6%; PreDiabetic 5 7-6 4%; Diabetic >=6 5%; Glycemic control for adults with diabetes <7 0% %     mg/dl   Lipid panel    Collection Time: 07/03/21  9:18 AM   Result Value Ref Range    Cholesterol 195 50 - 200 mg/dL    Triglycerides 85 <=150 mg/dL    HDL, Direct 56 >=40 mg/dL    LDL Calculated 122 (H) 0 - 100 mg/dL    Non-HDL-Chol (CHOL-HDL) 139 mg/dl       This note was created with voice recognition software  Phonic, grammatical and spelling errors may be present within the note as a result

## 2021-10-02 DIAGNOSIS — I10 HYPERTENSION, BENIGN: ICD-10-CM

## 2021-10-04 RX ORDER — QUINAPRIL HCL AND HYDROCHLOROTHIAZIDE 20; 25 MG/1; MG/1
TABLET ORAL
Qty: 90 TABLET | Refills: 3 | Status: SHIPPED | OUTPATIENT
Start: 2021-10-04 | End: 2022-03-16 | Stop reason: ALTCHOICE

## 2021-10-07 DIAGNOSIS — R10.84 GENERALIZED ABDOMINAL PAIN: ICD-10-CM

## 2021-10-07 RX ORDER — HYOSCYAMINE SULFATE 0.125 MG
TABLET ORAL
Qty: 60 TABLET | Refills: 2 | Status: SHIPPED | OUTPATIENT
Start: 2021-10-07 | End: 2021-10-19

## 2021-10-19 DIAGNOSIS — R10.84 GENERALIZED ABDOMINAL PAIN: ICD-10-CM

## 2021-10-19 RX ORDER — HYOSCYAMINE SULFATE 0.125 MG
TABLET ORAL
Qty: 60 TABLET | Refills: 2 | Status: SHIPPED | OUTPATIENT
Start: 2021-10-19 | End: 2021-11-09

## 2021-11-02 DIAGNOSIS — E11.9 TYPE 2 DIABETES MELLITUS WITHOUT COMPLICATION, WITHOUT LONG-TERM CURRENT USE OF INSULIN (HCC): ICD-10-CM

## 2021-11-02 RX ORDER — BLOOD SUGAR DIAGNOSTIC
STRIP MISCELLANEOUS
Qty: 100 STRIP | Refills: 2 | Status: SHIPPED | OUTPATIENT
Start: 2021-11-02 | End: 2022-08-08

## 2021-11-10 ENCOUNTER — APPOINTMENT (OUTPATIENT)
Dept: LAB | Facility: CLINIC | Age: 76
End: 2021-11-10
Payer: MEDICARE

## 2021-11-10 DIAGNOSIS — E78.2 MIXED HYPERLIPIDEMIA: ICD-10-CM

## 2021-11-10 LAB
ALT SERPL W P-5'-P-CCNC: 23 U/L (ref 12–78)
AST SERPL W P-5'-P-CCNC: 18 U/L (ref 5–45)
CHOLEST SERPL-MCNC: 181 MG/DL (ref 50–200)
HDLC SERPL-MCNC: 54 MG/DL
LDLC SERPL CALC-MCNC: 110 MG/DL (ref 0–100)
NONHDLC SERPL-MCNC: 127 MG/DL
TRIGL SERPL-MCNC: 83 MG/DL

## 2021-11-10 PROCEDURE — 80061 LIPID PANEL: CPT

## 2021-11-10 PROCEDURE — 36415 COLL VENOUS BLD VENIPUNCTURE: CPT

## 2021-11-10 PROCEDURE — 84460 ALANINE AMINO (ALT) (SGPT): CPT

## 2021-11-10 PROCEDURE — 84450 TRANSFERASE (AST) (SGOT): CPT

## 2021-11-11 ENCOUNTER — TELEPHONE (OUTPATIENT)
Dept: ADMINISTRATIVE | Facility: OTHER | Age: 76
End: 2021-11-11

## 2021-11-11 ENCOUNTER — OFFICE VISIT (OUTPATIENT)
Dept: INTERNAL MEDICINE CLINIC | Facility: CLINIC | Age: 76
End: 2021-11-11
Payer: MEDICARE

## 2021-11-11 VITALS
HEIGHT: 65 IN | SYSTOLIC BLOOD PRESSURE: 134 MMHG | BODY MASS INDEX: 31.09 KG/M2 | HEART RATE: 68 BPM | DIASTOLIC BLOOD PRESSURE: 86 MMHG | WEIGHT: 186.6 LBS | TEMPERATURE: 97.6 F | OXYGEN SATURATION: 97 %

## 2021-11-11 DIAGNOSIS — E78.2 MIXED HYPERLIPIDEMIA: ICD-10-CM

## 2021-11-11 DIAGNOSIS — E11.9 TYPE 2 DIABETES MELLITUS WITHOUT COMPLICATION, WITHOUT LONG-TERM CURRENT USE OF INSULIN (HCC): Primary | ICD-10-CM

## 2021-11-11 DIAGNOSIS — I10 HYPERTENSION, BENIGN: ICD-10-CM

## 2021-11-11 PROCEDURE — 99214 OFFICE O/P EST MOD 30 MIN: CPT | Performed by: INTERNAL MEDICINE

## 2022-03-09 ENCOUNTER — RA CDI HCC (OUTPATIENT)
Dept: OTHER | Facility: HOSPITAL | Age: 77
End: 2022-03-09

## 2022-03-09 NOTE — PROGRESS NOTES
E11 22    [facility]  coding opportunities             Chart Reviewed * (Number of) Inbasket suggestions sent to Provider: 1                  Patients insurance company: [employer] (Medicare Advantage and Commercial)

## 2022-03-15 ENCOUNTER — APPOINTMENT (OUTPATIENT)
Dept: LAB | Facility: CLINIC | Age: 77
End: 2022-03-15
Payer: MEDICARE

## 2022-03-15 ENCOUNTER — TELEPHONE (OUTPATIENT)
Dept: ADMINISTRATIVE | Facility: OTHER | Age: 77
End: 2022-03-15

## 2022-03-15 DIAGNOSIS — E11.9 TYPE 2 DIABETES MELLITUS WITHOUT COMPLICATION, WITHOUT LONG-TERM CURRENT USE OF INSULIN (HCC): ICD-10-CM

## 2022-03-15 LAB
ALBUMIN SERPL BCP-MCNC: 3.4 G/DL (ref 3.5–5)
ALP SERPL-CCNC: 117 U/L (ref 46–116)
ALT SERPL W P-5'-P-CCNC: 23 U/L (ref 12–78)
ANION GAP SERPL CALCULATED.3IONS-SCNC: 6 MMOL/L (ref 4–13)
AST SERPL W P-5'-P-CCNC: 19 U/L (ref 5–45)
BASOPHILS # BLD AUTO: 0.04 THOUSANDS/ΜL (ref 0–0.1)
BASOPHILS NFR BLD AUTO: 1 % (ref 0–1)
BILIRUB SERPL-MCNC: 0.26 MG/DL (ref 0.2–1)
BUN SERPL-MCNC: 23 MG/DL (ref 5–25)
CALCIUM ALBUM COR SERPL-MCNC: 10.6 MG/DL (ref 8.3–10.1)
CALCIUM SERPL-MCNC: 10.1 MG/DL (ref 8.3–10.1)
CHLORIDE SERPL-SCNC: 103 MMOL/L (ref 100–108)
CHOLEST SERPL-MCNC: 180 MG/DL
CO2 SERPL-SCNC: 28 MMOL/L (ref 21–32)
CREAT SERPL-MCNC: 1.24 MG/DL (ref 0.6–1.3)
EOSINOPHIL # BLD AUTO: 0.21 THOUSAND/ΜL (ref 0–0.61)
EOSINOPHIL NFR BLD AUTO: 4 % (ref 0–6)
ERYTHROCYTE [DISTWIDTH] IN BLOOD BY AUTOMATED COUNT: 13.7 % (ref 11.6–15.1)
EST. AVERAGE GLUCOSE BLD GHB EST-MCNC: 146 MG/DL
GFR SERPL CREATININE-BSD FRML MDRD: 42 ML/MIN/1.73SQ M
GLUCOSE P FAST SERPL-MCNC: 120 MG/DL (ref 65–99)
HBA1C MFR BLD: 6.7 %
HCT VFR BLD AUTO: 42.4 % (ref 34.8–46.1)
HDLC SERPL-MCNC: 51 MG/DL
HGB BLD-MCNC: 13.2 G/DL (ref 11.5–15.4)
IMM GRANULOCYTES # BLD AUTO: 0.02 THOUSAND/UL (ref 0–0.2)
IMM GRANULOCYTES NFR BLD AUTO: 0 % (ref 0–2)
LDLC SERPL CALC-MCNC: 106 MG/DL (ref 0–100)
LYMPHOCYTES # BLD AUTO: 1.53 THOUSANDS/ΜL (ref 0.6–4.47)
LYMPHOCYTES NFR BLD AUTO: 31 % (ref 14–44)
MCH RBC QN AUTO: 28.8 PG (ref 26.8–34.3)
MCHC RBC AUTO-ENTMCNC: 31.1 G/DL (ref 31.4–37.4)
MCV RBC AUTO: 93 FL (ref 82–98)
MONOCYTES # BLD AUTO: 0.56 THOUSAND/ΜL (ref 0.17–1.22)
MONOCYTES NFR BLD AUTO: 11 % (ref 4–12)
NEUTROPHILS # BLD AUTO: 2.61 THOUSANDS/ΜL (ref 1.85–7.62)
NEUTS SEG NFR BLD AUTO: 53 % (ref 43–75)
NONHDLC SERPL-MCNC: 129 MG/DL
NRBC BLD AUTO-RTO: 0 /100 WBCS
PLATELET # BLD AUTO: 283 THOUSANDS/UL (ref 149–390)
PMV BLD AUTO: 10.4 FL (ref 8.9–12.7)
POTASSIUM SERPL-SCNC: 4 MMOL/L (ref 3.5–5.3)
PROT SERPL-MCNC: 7.9 G/DL (ref 6.4–8.2)
RBC # BLD AUTO: 4.58 MILLION/UL (ref 3.81–5.12)
SODIUM SERPL-SCNC: 137 MMOL/L (ref 136–145)
TRIGL SERPL-MCNC: 115 MG/DL
WBC # BLD AUTO: 4.97 THOUSAND/UL (ref 4.31–10.16)

## 2022-03-15 PROCEDURE — 83036 HEMOGLOBIN GLYCOSYLATED A1C: CPT

## 2022-03-15 PROCEDURE — 85025 COMPLETE CBC W/AUTO DIFF WBC: CPT

## 2022-03-15 PROCEDURE — 36415 COLL VENOUS BLD VENIPUNCTURE: CPT

## 2022-03-15 PROCEDURE — 80061 LIPID PANEL: CPT

## 2022-03-15 PROCEDURE — 80053 COMPREHEN METABOLIC PANEL: CPT

## 2022-03-15 NOTE — TELEPHONE ENCOUNTER
----- Message from Luis Muse LPN sent at 6/69/4492 10:08 AM EDT -----  Regarding: DM Eye Exam  03/15/22 10:09 AM    Hello, our patient Moses Colin has had Diabetic Eye Exam completed/performed  Please assist in updating the patient chart by pulling the Care Everywhere (CE) document  The date of service is 05/28/2020       Thank you,  Luis Muse LPN  [facility]

## 2022-03-16 ENCOUNTER — OFFICE VISIT (OUTPATIENT)
Dept: INTERNAL MEDICINE CLINIC | Facility: CLINIC | Age: 77
End: 2022-03-16
Payer: MEDICARE

## 2022-03-16 VITALS
RESPIRATION RATE: 16 BRPM | DIASTOLIC BLOOD PRESSURE: 70 MMHG | HEART RATE: 76 BPM | BODY MASS INDEX: 30.59 KG/M2 | SYSTOLIC BLOOD PRESSURE: 98 MMHG | OXYGEN SATURATION: 97 % | HEIGHT: 65 IN | WEIGHT: 183.6 LBS | TEMPERATURE: 97.9 F

## 2022-03-16 DIAGNOSIS — E11.9 TYPE 2 DIABETES MELLITUS WITHOUT COMPLICATION, WITHOUT LONG-TERM CURRENT USE OF INSULIN (HCC): ICD-10-CM

## 2022-03-16 DIAGNOSIS — R35.0 URINARY FREQUENCY: ICD-10-CM

## 2022-03-16 DIAGNOSIS — Z78.0 POSTMENOPAUSAL: ICD-10-CM

## 2022-03-16 DIAGNOSIS — Z13.31 DEPRESSION SCREENING: ICD-10-CM

## 2022-03-16 DIAGNOSIS — I10 HYPERTENSION, BENIGN: ICD-10-CM

## 2022-03-16 DIAGNOSIS — Z00.00 MEDICARE ANNUAL WELLNESS VISIT, SUBSEQUENT: Primary | ICD-10-CM

## 2022-03-16 DIAGNOSIS — E78.2 MIXED HYPERLIPIDEMIA: ICD-10-CM

## 2022-03-16 PROCEDURE — G0439 PPPS, SUBSEQ VISIT: HCPCS | Performed by: INTERNAL MEDICINE

## 2022-03-16 PROCEDURE — 99214 OFFICE O/P EST MOD 30 MIN: CPT | Performed by: INTERNAL MEDICINE

## 2022-03-16 PROCEDURE — 1123F ACP DISCUSS/DSCN MKR DOCD: CPT | Performed by: INTERNAL MEDICINE

## 2022-03-16 PROCEDURE — G0444 DEPRESSION SCREEN ANNUAL: HCPCS | Performed by: INTERNAL MEDICINE

## 2022-03-16 RX ORDER — QUINAPRIL 20 MG/1
20 TABLET ORAL
Qty: 90 TABLET | Refills: 1 | Status: SHIPPED | OUTPATIENT
Start: 2022-03-16

## 2022-03-16 NOTE — TELEPHONE ENCOUNTER
Upon review of the In Basket request we were able to locate, review, and update the patient chart as requested for Diabetic Eye Exam     Any additional questions or concerns should be emailed to the Practice Liaisons via [email or web address] email, please do not reply via In Basket      Thank you  Indy Stewart MA

## 2022-03-16 NOTE — PROGRESS NOTES
Assessment/Plan:       Chronic problems appear stable  For the frequent urination, suspect incontinence  Since her pressure is good, will take out her water pill from her pressure medicine  She will check at home and call if not controlled  Ordered urinalysis with culture  Quality Measures:       Return in about 4 months (around 7/16/2022)  No problem-specific Assessment & Plan notes found for this encounter  Diagnoses and all orders for this visit:    Medicare annual wellness visit, subsequent    Type 2 diabetes mellitus without complication, without long-term current use of insulin ([facility] [address] )    Hypertension, benign  -     quinapril (ACCUPRIL) 20 mg tablet; Take 1 tablet (20 mg total) by mouth daily at bedtime    Mixed hyperlipidemia    Postmenopausal  -     DXA bone density spine hip and pelvis; Future    Urinary frequency  -     Urinalysis with microscopic; Future  -     Urine culture; Future    Depression screening          Subjective:      Patient ID: Roderick Bermeo is a 68 y o  female  Patient comes in today for routine follow-up and Medicare wellness with her   Her labs are overall improved  Blood pressure is very good  Sugars down  Cholesterol is down  Taking her medicines as directed  Watching her diet  She has lost a little weight  She does note that she has frequent urination  That has been going on for a while  She wakes up a few times at night  No UTI symptoms  She is on a water pill        ALLERGIES:  Allergies   Allergen Reactions    Nuts - Food Allergy Anaphylaxis     Raw Almonds only    Codeine Other (See Comments)     Dizzy, lightheaded       CURRENT MEDICATIONS:    Current Outpatient Medications:     Accu-Chek Guide test strip, CHECK SUGAR DAILY, Disp: 100 strip, Rfl: 2    amLODIPine (NORVASC) 5 mg tablet, TAKE 1 TABLET BY MOUTH EVERY DAY, Disp: 90 tablet, Rfl: 3    aspirin (ECOTRIN LOW STRENGTH) 81 mg EC tablet, Take 1 tablet (81 mg total) by mouth daily, Disp: , Rfl: 0    hyoscyamine (ANASPAZ,LEVSIN) 0 125 MG tablet, TAKE 1 TABLET BY MOUTH 3-4 TIMES DAILY AS NEEDED , Disp: 60 tablet, Rfl: 2    levocetirizine (XYZAL) 5 MG tablet, Take 5 mg by mouth as needed , Disp: , Rfl:     lovastatin (MEVACOR) 20 mg tablet, Take 1 tablet (20 mg total) by mouth daily at bedtime, Disp: 90 tablet, Rfl: 3    metFORMIN (GLUCOPHAGE) 1000 MG tablet, Take 1 tablet (1,000 mg total) by mouth 2 (two) times a day, Disp: 180 tablet, Rfl: 3    naproxen (NAPROSYN) 250 mg tablet, Take 1 tablet (250 mg total) by mouth 2 (two) times a day with meals, Disp: 20 tablet, Rfl: 0    quinapril (ACCUPRIL) 20 mg tablet, Take 1 tablet (20 mg total) by mouth daily at bedtime, Disp: 90 tablet, Rfl: 1    ACTIVE PROBLEM LIST:  Patient Active Problem List   Diagnosis    Allergic rhinitis    Mixed hyperlipidemia    Hypertension, benign    Irritable bowel syndrome without diarrhea    Type 2 diabetes mellitus without complication, without long-term current use of insulin (HCC)    Screening for malignant neoplasm of breast    Abnormal mammogram    Breast pain, right    Paresthesia of both feet    Bilateral cold feet    Chronic bilateral low back pain    Memory loss    Speech defect    Other headache syndrome       PAST MEDICAL HISTORY:  Past Medical History:   Diagnosis Date    Allergic     Arthritis     Diabetes mellitus (HCC)     Diverticulitis of colon     GERD (gastroesophageal reflux disease)     Headache(784 0)     HNP (herniated nucleus pulposus), lumbar     Hyperlipidemia     Hypertension     IBS (irritable bowel syndrome)     Osteoarthritis        PAST SURGICAL HISTORY:  Past Surgical History:   Procedure Laterality Date    PARATHYROID GLAND SURGERY      resolved 05/12    [place] ESOPHAGOGASTRODUODENOSCOPY TRANSORAL DIAGNOSTIC N/A 4/19/2017    Procedure: EGD AND COLONOSCOPY;  Surgeon: Manley Babinski, MD;  Location: [facility];   Service: Gastroenterology    UMBILICAL HERNIA REPAIR         FAMILY HISTORY:  Family History   Problem Relation Age of Onset    Breast cancer Mother     Heart defect Brother     Arthritis Family     Hypertension Family     No Known Problems Father     No Known Problems Brother        SOCIAL HISTORY:  Social History     Socioeconomic History    Marital status: /Civil Union     Spouse name: Not on file    Number of children: 2    Years of education: Not on file    Highest education level: Not on file   Occupational History    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   Tobacco Use    Smoking status: Never Smoker    Smokeless tobacco: Never Used   Substance and Sexual Activity    Alcohol use: No    Drug use: No    Sexual activity: Yes     Partners: Male   Other Topics Concern    Not on file   Social History Narrative    Not on file     Social Determinants of Health     Financial Resource Strain: Not on file   Food Insecurity: Not on file   Transportation Needs: Not on file   Physical Activity: Not on file   Stress: Not on file   Social Connections: Not on file   Intimate Partner Violence: Not on file   Housing Stability: Not on file       Review of Systems   Respiratory: Negative for shortness of breath  Cardiovascular: Negative for chest pain  Gastrointestinal: Negative for abdominal pain  Objective:  Vitals:    03/16/22 1343   BP: 98/70   BP Location: Left arm   Patient Position: Sitting   Cuff Size: Adult   Pulse: 76   Resp: 16   Temp: 97 9 °F (36 6 °C)   TempSrc: Tympanic   SpO2: 97%   Weight: 83 3 kg (183 lb 9 6 oz)   Height: 5' 5" (1 651 m)     Body mass index is 30 55 kg/m²  Physical Exam  Vitals and nursing note reviewed  Constitutional:       Appearance: She is well-developed  Cardiovascular:      Rate and Rhythm: Normal rate and regular rhythm  Heart sounds: Normal heart sounds  Pulmonary:      Effort: Pulmonary effort is normal       Breath sounds: Normal breath sounds  Abdominal:      Palpations: Abdomen is soft  Tenderness: There is no abdominal tenderness  Neurological:      Mental Status: She is alert and oriented to person, place, and time             RESULTS:    Recent Results (from the past 1008 hour(s))   Comprehensive metabolic panel    Collection Time: 03/15/22 10:19 AM   Result Value Ref Range    Sodium 137 136 - 145 mmol/L    Potassium 4 0 3 5 - 5 3 mmol/L    Chloride 103 100 - 108 mmol/L    CO2 28 21 - 32 mmol/L    ANION GAP 6 4 - 13 mmol/L    BUN 23 5 - 25 mg/dL    Creatinine 1 24 0 60 - 1 30 mg/dL    Glucose, Fasting 120 (H) 65 - 99 mg/dL    Calcium 10 1 8 3 - 10 1 mg/dL    Corrected Calcium 10 6 (H) 8 3 - 10 1 mg/dL    AST 19 5 - 45 U/L    ALT 23 12 - 78 U/L    Alkaline Phosphatase 117 (H) 46 - 116 U/L    Total Protein 7 9 6 4 - 8 2 g/dL    Albumin 3 4 (L) 3 5 - 5 0 g/dL    Total Bilirubin 0 26 0 20 - 1 00 mg/dL    eGFR 42 ml/min/1 73sq m   CBC and differential    Collection Time: 03/15/22 10:19 AM   Result Value Ref Range    WBC 4 97 4 31 - 10 16 Thousand/uL    RBC 4 58 3 81 - 5 12 Million/uL    Hemoglobin 13 2 11 5 - 15 4 g/dL    Hematocrit 42 4 34 8 - 46 1 %    MCV 93 82 - 98 fL    MCH 28 8 26 8 - 34 3 pg    MCHC 31 1 (L) 31 4 - 37 4 g/dL    RDW 13 7 11 6 - 15 1 %    MPV 10 4 8 9 - 12 7 fL    Platelets 316 279 - 752 Thousands/uL    nRBC 0 /100 WBCs    Neutrophils Relative 53 43 - 75 %    Immat GRANS % 0 0 - 2 %    Lymphocytes Relative 31 14 - 44 %    Monocytes Relative 11 4 - 12 %    Eosinophils Relative 4 0 - 6 %    Basophils Relative 1 0 - 1 %    Neutrophils Absolute 2 61 1 85 - 7 62 Thousands/µL    Immature Grans Absolute 0 02 0 00 - 0 20 Thousand/uL    Lymphocytes Absolute 1 53 0 60 - 4 47 Thousands/µL    Monocytes Absolute 0 56 0 17 - 1 22 Thousand/µL    Eosinophils Absolute 0 21 0 00 - 0 61 Thousand/µL    Basophils Absolute 0 04 0 00 - 0 10 Thousands/µL   Hemoglobin A1C    Collection Time: 03/15/22 10:19 AM   Result Value Ref Range    Hemoglobin A1C 6 7 (H) Normal 3 8-5 6%; PreDiabetic 5 7-6 4%; Diabetic >=6 5%; Glycemic control for adults with diabetes <7 0% %     mg/dl   Lipid panel    Collection Time: 03/15/22 10:19 AM   Result Value Ref Range    Cholesterol 180 See Comment mg/dL    Triglycerides 115 See Comment mg/dL    HDL, Direct 51 >=50 mg/dL    LDL Calculated 106 (H) 0 - 100 mg/dL    Non-HDL-Chol (CHOL-HDL) 129 mg/dl       This note was created with voice recognition software  Phonic, grammatical and spelling errors may be present within the note as a result

## 2022-03-16 NOTE — PROGRESS NOTES
Patient's shoes and socks removed  Right Foot/Ankle   Right Foot Inspection  Skin Exam: skin normal and skin intact  No dry skin, no warmth, no callus, no erythema, no maceration, no abnormal color, no pre-ulcer, no ulcer and no callus  Toe Exam: ROM and strength within normal limits  Sensory   Monofilament testing: intact    Vascular  The right DP pulse is 2+  Right Toe  - Comprehensive Exam  Ecchymosis: none  Swelling: none         Left Foot/Ankle  Left Foot Inspection  Skin Exam: skin normal and skin intact  No dry skin, no warmth, no erythema, no maceration, normal color, no pre-ulcer, no ulcer and no callus  Toe Exam: ROM and strength within normal limits  Sensory   Monofilament testing: intact    Vascular  The left DP pulse is 2+       Left Toe  - Comprehensive Exam  Ecchymosis: none  Swelling: none           Assign Risk Category  No deformity present  No loss of protective sensation  No weak pulses  Risk: 1

## 2022-03-16 NOTE — PROGRESS NOTES
Assessment and Plan:     Problem List Items Addressed This Visit     None           Preventive health issues were discussed with patient, and age appropriate screening tests were ordered as noted in patient's After Visit Summary  Personalized health advice and appropriate referrals for health education or preventive services given if needed, as noted in patient's After Visit Summary  History of Present Illness:     Patient presents for Medicare Annual Wellness visit    Patient Care Team:  Martha Bradshaw MD as PCP - General  Manley Babinski, MD Manley Babinski, MD as Endoscopist     Problem List:     Patient Active Problem List   Diagnosis    Allergic rhinitis    Mixed hyperlipidemia    Hypertension, benign    Irritable bowel syndrome without diarrhea    Type 2 diabetes mellitus without complication, without long-term current use of insulin ([facility] )    Screening for malignant neoplasm of breast    Abnormal mammogram    Breast pain, right    Paresthesia of both feet    Bilateral cold feet    Chronic bilateral low back pain    Memory loss    Speech defect    Other headache syndrome      Past Medical and Surgical History:     Past Medical History:   Diagnosis Date    Allergic     Arthritis     Diabetes mellitus ([facility] [address] [facility]     Diverticulitis of colon     GERD (gastroesophageal reflux disease)     Headache(784 0)     HNP (herniated nucleus pulposus), lumbar     Hyperlipidemia     Hypertension     IBS (irritable bowel syndrome)     Osteoarthritis      Past Surgical History:   Procedure Laterality Date    PARATHYROID GLAND SURGERY      resolved 05/12    [place] ESOPHAGOGASTRODUODENOSCOPY TRANSORAL DIAGNOSTIC N/A 4/19/2017    Procedure: EGD AND COLONOSCOPY;  Surgeon: Manley Babinski, MD;  Location: [facility];   Service: Gastroenterology    UMBILICAL HERNIA REPAIR        Family History:     Family History   Problem Relation Age of Onset    Breast cancer Mother     Heart defect Brother     Arthritis Family     Hypertension Family     No Known Problems Father     No Known Problems Brother       Social History:     Social History     Socioeconomic History    Marital status: /Civil Union     Spouse name: None    Number of children: 2    Years of education: None    Highest education level: None   Occupational History    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   Tobacco Use    Smoking status: Never Smoker    Smokeless tobacco: Never Used   Substance and Sexual Activity    Alcohol use: No    Drug use: No    Sexual activity: Yes     Partners: Male   Other Topics Concern    None   Social History Narrative    None     Social Determinants of Health     Financial Resource Strain: Not on file   Food Insecurity: Not on file   Transportation Needs: Not on file   Physical Activity: Not on file   Stress: Not on file   Social Connections: Not on file   Intimate Partner Violence: Not on file   Housing Stability: Not on file      Medications and Allergies:     Current Outpatient Medications   Medication Sig Dispense Refill    Accu-Chek Guide test strip CHECK SUGAR DAILY 100 strip 2    amLODIPine (NORVASC) 5 mg tablet TAKE 1 TABLET BY MOUTH EVERY DAY 90 tablet 3    aspirin (ECOTRIN LOW STRENGTH) 81 mg EC tablet Take 1 tablet (81 mg total) by mouth daily  0    hyoscyamine (ANASPAZ,LEVSIN) 0 125 MG tablet TAKE 1 TABLET BY MOUTH 3-4 TIMES DAILY AS NEEDED   60 tablet 2    levocetirizine (XYZAL) 5 MG tablet Take 5 mg by mouth as needed       lovastatin (MEVACOR) 20 mg tablet Take 1 tablet (20 mg total) by mouth daily at bedtime 90 tablet 3    metFORMIN (GLUCOPHAGE) 1000 MG tablet Take 1 tablet (1,000 mg total) by mouth 2 (two) times a day 180 tablet 3    naproxen (NAPROSYN) 250 mg tablet Take 1 tablet (250 mg total) by mouth 2 (two) times a day with meals 20 tablet 0    quinapril-hydrochlorothiazide (ACCURETIC) 20-25 MG per tablet TAKE 1 TABLET BY MOUTH EVERY DAY 90 tablet 3     No current facility-administered medications for this visit  Allergies   Allergen Reactions    Nuts - Food Allergy Anaphylaxis     Raw Almonds only    Codeine Other (See Comments)     Dizzy, lightheaded      Immunizations:     Immunization History   Administered Date(s) Administered    INFLUENZA 12/15/2010    Influenza Split High Dose Preservative Free IM 12/05/2017    Influenza, high dose seasonal 0 7 mL 10/05/2018, 09/23/2019, 11/10/2020, 10/15/2021    Pneumococcal Conjugate 13-Valent 12/05/2017    Pneumococcal Polysaccharide PPV23 12/15/2010, 05/14/2019      Health Maintenance:         Topic Date Due    Hepatitis C Screening  Never done    Colorectal Cancer Screening  04/19/2022    Breast Cancer Screening: Mammogram  05/13/2022         Topic Date Due    COVID-19 Vaccine (1) Never done    DTaP,Tdap,and Td Vaccines (1 - Tdap) Never done      Medicare Health Risk Assessment:     Ht 5' 5" (1 651 m)   BMI 31 05 kg/m²      Deysi Reyes is here for her Subsequent Wellness visit  Health Risk Assessment:   Patient rates overall health as good  Patient feels that their physical health rating is same  Patient is satisfied with their life  Eyesight was rated as slightly worse  Hearing was rated as same  Patient feels that their emotional and mental health rating is slightly better  Patients states they are sometimes angry  Patient states they are sometimes unusually tired/fatigued  Pain experienced in the last 7 days has been none  Patient states that she has experienced no weight loss or gain in last 6 months  Depression Screening:   PHQ-2 Score: 0      Fall Risk Screening: In the past year, patient has experienced: no history of falling in past year      Urinary Incontinence Screening:   Patient has leaked urine accidently in the last six months  Home Safety:  Patient does not have trouble with stairs inside or outside of their home   Patient has working smoke alarms and has working carbon monoxide detector  Home safety hazards include: none  Nutrition:   Current diet is Low Cholesterol and Limited junk food  Medications:   Patient is currently taking over-the-counter supplements  OTC medications include: Tylenol  Patient is able to manage medications  Activities of Daily Living (ADLs)/Instrumental Activities of Daily Living (IADLs):   Walk and transfer into and out of bed and chair?: Yes  Dress and groom yourself?: Yes    Bathe or shower yourself?: Yes    Feed yourself? Yes  Do your laundry/housekeeping?: Yes  Manage your money, pay your bills and track your expenses?: Yes  Make your own meals?: Yes    Do your own shopping?: Yes    Previous Hospitalizations:   Any hospitalizations or ED visits within the last 12 months?: No      Advance Care Planning:   Living will: Yes    Durable POA for healthcare: Yes    Advanced directive: Yes    Advanced directive counseling given: Yes      Cognitive Screening:   Provider or family/friend/caregiver concerned regarding cognition?: No    PREVENTIVE SCREENINGS      Cardiovascular Screening:    General: Screening Not Indicated and History Lipid Disorder      Diabetes Screening:     General: Screening Not Indicated and History Diabetes      Colorectal Cancer Screening:     General: Screening Current      Breast Cancer Screening:     General: Screening Current      Cervical Cancer Screening:    General: Screening Not Indicated      Osteoporosis Screening:      Due for: Bone Density Ultrasound      Abdominal Aortic Aneurysm (AAA) Screening:        General: Screening Not Indicated      Lung Cancer Screening:     General: Screening Not Indicated      Hepatitis C Screening:    General: Screening Not Indicated    Screening, Brief Intervention, and Referral to Treatment (SBIRT)    Screening  Typical number of drinks in a day: 0  Typical number of drinks in a week: 0  Interpretation: Low risk drinking behavior      AUDIT-C Screenin) How often did you have a drink containing alcohol in the past year? monthly or less  2) How many drinks did you have on a typical day when you were drinking in the past year? 1 to 2  3) How often did you have 6 or more drinks on one occasion in the past year? never    AUDIT-C Score: 1  Interpretation: Score 0-2 (female): Negative screen for alcohol misuse    Single Item Drug Screening:  How often have you used an illegal drug (including marijuana) or a prescription medication for non-medical reasons in the past year? never    Single Item Drug Screen Score: 0  Interpretation: Negative screen for possible drug use disorder    Brief Intervention  Alcohol & drug use screenings were reviewed  No concerns regarding substance use disorder identified         Christian Arguello MD

## 2022-03-16 NOTE — PATIENT INSTRUCTIONS
Medicare Preventive Visit Patient Instructions  Thank you for completing your Welcome to Medicare Visit or Medicare Annual Wellness Visit today  Your next wellness visit will be due in one year (3/17/2023)  The screening/preventive services that you may require over the next 5-10 years are detailed below  Some tests may not apply to you based off risk factors and/or age  Screening tests ordered at today's visit but not completed yet may show as past due  Also, please note that scanned in results may not display below  Preventive Screenings:  Service Recommendations Previous Testing/Comments   Colorectal Cancer Screening  * Colonoscopy    * Fecal Occult Blood Test (FOBT)/Fecal Immunochemical Test (FIT)  * Fecal DNA/Cologuard Test  * Flexible Sigmoidoscopy Age: 54-65 years old   Colonoscopy: every 10 years (may be performed more frequently if at higher risk)  OR  FOBT/FIT: every 1 year  OR  Cologuard: every 3 years  OR  Sigmoidoscopy: every 5 years  Screening may be recommended earlier than age 48 if at higher risk for colorectal cancer  Also, an individualized decision between you and your healthcare provider will decide whether screening between the ages of 74-80 would be appropriate  Colonoscopy: 04/19/2017  FOBT/FIT: Not on file  Cologuard: Not on file  Sigmoidoscopy: Not on file    Screening Current     Breast Cancer Screening Age: 36 years old  Frequency: every 1-2 years  Not required if history of left and right mastectomy Mammogram: 05/13/2021    Screening Current   Cervical Cancer Screening Between the ages of 21-29, pap smear recommended once every 3 years  Between the ages of 33-67, can perform pap smear with HPV co-testing every 5 years     Recommendations may differ for women with a history of total hysterectomy, cervical cancer, or abnormal pap smears in past  Pap Smear: Not on file    Screening Not Indicated   Hepatitis C Screening Once for adults born between 1945 and 1965  More frequently in patients at high risk for Hepatitis C Hep C Antibody: Not on file        Diabetes Screening 1-2 times per year if you're at risk for diabetes or have pre-diabetes Fasting glucose: 120 mg/dL   A1C: 6 7 %    Screening Not Indicated  History Diabetes   Cholesterol Screening Once every 5 years if you don't have a lipid disorder  May order more often based on risk factors  Lipid panel: 03/15/2022    Screening Not Indicated  History Lipid Disorder     Other Preventive Screenings Covered by Medicare:  1  Abdominal Aortic Aneurysm (AAA) Screening: covered once if your at risk  You're considered to be at risk if you have a family history of AAA  2  Lung Cancer Screening: covers low dose CT scan once per year if you meet all of the following conditions: (1) Age 50-69; (2) No signs or symptoms of lung cancer; (3) Current smoker or have quit smoking within the last 15 years; (4) You have a tobacco smoking history of at least 30 pack years (packs per day multiplied by number of years you smoked); (5) You get a written order from a healthcare provider  3  Glaucoma Screening: covered annually if you're considered high risk: (1) You have diabetes OR (2) Family history of glaucoma OR (3)  aged 48 and older OR (3)  American aged 72 and older  3  Osteoporosis Screening: covered every 2 years if you meet one of the following conditions: (1) You're estrogen deficient and at risk for osteoporosis based off medical history and other findings; (2) Have a vertebral abnormality; (3) On glucocorticoid therapy for more than 3 months; (4) Have primary hyperparathyroidism; (5) On osteoporosis medications and need to assess response to drug therapy  · Last bone density test (DXA Scan): 05/05/2017  5  HIV Screening: covered annually if you're between the age of 12-76  Also covered annually if you are younger than 13 and older than 72 with risk factors for HIV infection   For pregnant patients, it is covered up to 3 times per pregnancy  Immunizations:  Immunization Recommendations   Influenza Vaccine Annual influenza vaccination during flu season is recommended for all persons aged >= 6 months who do not have contraindications   Pneumococcal Vaccine (Prevnar and Pneumovax)  * Prevnar = PCV13  * Pneumovax = PPSV23   Adults 25-60 years old: 1-3 doses may be recommended based on certain risk factors  Adults 72 years old: Prevnar (PCV13) vaccine recommended followed by Pneumovax (PPSV23) vaccine  If already received PPSV23 since turning 65, then PCV13 recommended at least one year after PPSV23 dose  Hepatitis B Vaccine 3 dose series if at intermediate or high risk (ex: diabetes, end stage renal disease, liver disease)   Tetanus (Td) Vaccine - COST NOT COVERED BY MEDICARE PART B Following completion of primary series, a booster dose should be given every 10 years to maintain immunity against tetanus  Td may also be given as tetanus wound prophylaxis  Tdap Vaccine - COST NOT COVERED BY MEDICARE PART B Recommended at least once for all adults  For pregnant patients, recommended with each pregnancy  Shingles Vaccine (Shingrix) - COST NOT COVERED BY MEDICARE PART B  2 shot series recommended in those aged 48 and above     Health Maintenance Due:      Topic Date Due    Hepatitis C Screening  Never done    Colorectal Cancer Screening  04/19/2022    Breast Cancer Screening: Mammogram  05/13/2022     Immunizations Due:      Topic Date Due    COVID-19 Vaccine (1) Never done    DTaP,Tdap,and Td Vaccines (1 - Tdap) Never done     Advance Directives   What are advance directives? Advance directives are legal documents that state your wishes and plans for medical care  These plans are made ahead of time in case you lose your ability to make decisions for yourself  Advance directives can apply to any medical decision, such as the treatments you want, and if you want to donate organs  What are the types of advance directives? There are many types of advance directives, and each state has rules about how to use them  You may choose a combination of any of the following:  · Living will: This is a written record of the treatment you want  You can also choose which treatments you do not want, which to limit, and which to stop at a certain time  This includes surgery, medicine, IV fluid, and tube feedings  · Durable power of  for healthcare [facility]): This is a written record that states who you want to make healthcare choices for you when you are unable to make them for yourself  This person, called a proxy, is usually a family member or a friend  You may choose more than 1 proxy  · Do not resuscitate (DNR) order:  A DNR order is used in case your heart stops beating or you stop breathing  It is a request not to have certain forms of treatment, such as CPR  A DNR order may be included in other types of advance directives  · Medical directive: This covers the care that you want if you are in a coma, near death, or unable to make decisions for yourself  You can list the treatments you want for each condition  Treatment may include pain medicine, surgery, blood transfusions, dialysis, IV or tube feedings, and a ventilator (breathing machine)  · Values history: This document has questions about your views, beliefs, and how you feel and think about life  This information can help others choose the care that you would choose  Why are advance directives important? An advance directive helps you control your care  Although spoken wishes may be used, it is better to have your wishes written down  Spoken wishes can be misunderstood, or not followed  Treatments may be given even if you do not want them  An advance directive may make it easier for your family to make difficult choices about your care  Urinary Incontinence   Urinary incontinence (UI)  is when you lose control of your bladder   UI develops because your bladder cannot store or empty urine properly  The 3 most common types of UI are stress incontinence, urge incontinence, or both  Medicines:   · May be given to help strengthen your bladder control  Report any side effects of medication to your healthcare provider  Do pelvic muscle exercises often:  Your pelvic muscles help you stop urinating  Squeeze these muscles tight for 5 seconds, then relax for 5 seconds  Gradually work up to squeezing for 10 seconds  Do 3 sets of 15 repetitions a day, or as directed  This will help strengthen your pelvic muscles and improve bladder control  Train your bladder:  Go to the bathroom at set times, such as every 2 hours, even if you do not feel the urge to go  You can also try to hold your urine when you feel the urge to go  For example, hold your urine for 5 minutes when you feel the urge to go  As that becomes easier, hold your urine for 10 minutes  Self-care:   · Keep a UI record  Write down how often you leak urine and how much you leak  Make a note of what you were doing when you leaked urine  · Drink liquids as directed  You may need to limit the amount of liquid you drink to help control your urine leakage  Do not drink any liquid right before you go to bed  Limit or do not have drinks that contain caffeine or alcohol  · Prevent constipation  Eat a variety of high-fiber foods  Good examples are high-fiber cereals, beans, vegetables, and whole-grain breads  Walking is the best way to trigger your intestines to have a bowel movement  · Exercise regularly and maintain a healthy weight  Weight loss and exercise will decrease pressure on your bladder and help you control your leakage  · Use a catheter as directed  to help empty your bladder  A catheter is a tiny, plastic tube that is put into your bladder to drain your urine  · Go to behavior therapy as directed  Behavior therapy may be used to help you learn to control your urge to urinate      Weight Management   Why it is important to manage your weight:  Being overweight increases your risk of health conditions such as heart disease, high blood pressure, type 2 diabetes, and certain types of cancer  It can also increase your risk for osteoarthritis, sleep apnea, and other respiratory problems  Aim for a slow, steady weight loss  Even a small amount of weight loss can lower your risk of health problems  How to lose weight safely:  A safe and healthy way to lose weight is to eat fewer calories and get regular exercise  You can lose up about 1 pound a week by decreasing the number of calories you eat by 500 calories each day  Healthy meal plan for weight management:  A healthy meal plan includes a variety of foods, contains fewer calories, and helps you stay healthy  A healthy meal plan includes the following:  · Eat whole-grain foods more often  A healthy meal plan should contain fiber  Fiber is the part of grains, fruits, and vegetables that is not broken down by your body  Whole-grain foods are healthy and provide extra fiber in your diet  Some examples of whole-grain foods are whole-wheat breads and pastas, oatmeal, brown rice, and bulgur  · Eat a variety of vegetables every day  Include dark, leafy greens such as spinach, kale, jackie greens, and mustard greens  Eat yellow and orange vegetables such as carrots, sweet potatoes, and winter squash  · Eat a variety of fruits every day  Choose fresh or canned fruit (canned in its own juice or light syrup) instead of juice  Fruit juice has very little or no fiber  · Eat low-fat dairy foods  Drink fat-free (skim) milk or 1% milk  Eat fat-free yogurt and low-fat cottage cheese  Try low-fat cheeses such as mozzarella and other reduced-fat cheeses  · Choose meat and other protein foods that are low in fat  Choose beans or other legumes such as split peas or lentils  Choose fish, skinless poultry (chicken or turkey), or lean cuts of red meat (beef or pork)   Before you cook meat or poultry, cut off any visible fat  · Use less fat and oil  Try baking foods instead of frying them  Add less fat, such as margarine, sour cream, regular salad dressing and mayonnaise to foods  Eat fewer high-fat foods  Some examples of high-fat foods include french fries, doughnuts, ice cream, and cakes  · Eat fewer sweets  Limit foods and drinks that are high in sugar  This includes candy, cookies, regular soda, and sweetened drinks  Exercise:  Exercise at least 30 minutes per day on most days of the week  Some examples of exercise include walking, biking, dancing, and swimming  You can also fit in more physical activity by taking the stairs instead of the elevator or parking farther away from stores  Ask your healthcare provider about the best exercise plan for you  © Copyright [address] 2018 Information is for End User's use only and may not be sold, redistributed or otherwise used for commercial purposes   All illustrations and images included in CareNotes® are the copyrighted property of A D [employer] M [employer]  or [address] [place] [address]

## 2022-03-23 ENCOUNTER — APPOINTMENT (OUTPATIENT)
Dept: LAB | Facility: CLINIC | Age: 77
End: 2022-03-23
Payer: MEDICARE

## 2022-03-23 DIAGNOSIS — R35.0 URINARY FREQUENCY: ICD-10-CM

## 2022-03-23 LAB
BACTERIA UR QL AUTO: ABNORMAL /HPF
BILIRUB UR QL STRIP: NEGATIVE
CLARITY UR: CLEAR
COLOR UR: ABNORMAL
GLUCOSE UR STRIP-MCNC: NEGATIVE MG/DL
HGB UR QL STRIP.AUTO: NEGATIVE
HYALINE CASTS #/AREA URNS LPF: ABNORMAL /LPF
KETONES UR STRIP-MCNC: NEGATIVE MG/DL
LEUKOCYTE ESTERASE UR QL STRIP: NEGATIVE
NITRITE UR QL STRIP: NEGATIVE
NON-SQ EPI CELLS URNS QL MICRO: ABNORMAL /HPF
PH UR STRIP.AUTO: 6 [PH]
PROT UR STRIP-MCNC: NEGATIVE MG/DL
RBC #/AREA URNS AUTO: ABNORMAL /HPF
SP GR UR STRIP.AUTO: 1.02 (ref 1–1.03)
UROBILINOGEN UR STRIP-ACNC: <2 MG/DL
WBC #/AREA URNS AUTO: ABNORMAL /HPF

## 2022-03-23 PROCEDURE — 81001 URINALYSIS AUTO W/SCOPE: CPT

## 2022-03-23 PROCEDURE — 87086 URINE CULTURE/COLONY COUNT: CPT

## 2022-03-24 LAB — BACTERIA UR CULT: NORMAL

## 2022-04-01 ENCOUNTER — TELEPHONE (OUTPATIENT)
Dept: ADMINISTRATIVE | Facility: OTHER | Age: 77
End: 2022-04-01

## 2022-04-01 NOTE — TELEPHONE ENCOUNTER
----- Message from Jessica Hicks, [address] sent at 3/31/2022  1:51 PM EDT -----  Regarding: dcm eye exam  03/31/22 1:51 PM    Hello, our patient attached above has had Diabetic Eye Exam completed/performed  Please assist in updating the patient chart by pulling the document from the Media Tab  The date of service is 6/1/21       Thank you,  Jessica Hicks MA  [facility]

## 2022-04-01 NOTE — TELEPHONE ENCOUNTER
Upon review of the In Basket request we were able to locate, review, and update the patient chart as requested for Diabetic Eye Exam     Any additional questions or concerns should be emailed to the Practice Liaisons via [email or web address] email, please do not reply via In Basket      Thank you  Donnamaria Fothergill

## 2022-06-24 ENCOUNTER — RA CDI HCC (OUTPATIENT)
Dept: OTHER | Facility: HOSPITAL | Age: 77
End: 2022-06-24

## 2022-06-24 NOTE — PROGRESS NOTES
[id number]  [facility] 75  coding opportunities          Chart Reviewed number of suggestions sent to Provider: 1     Patients Insurance     Medicare Insurance: [employer]

## 2022-07-19 ENCOUNTER — TELEPHONE (OUTPATIENT)
Dept: INTERNAL MEDICINE CLINIC | Facility: CLINIC | Age: 77
End: 2022-07-19

## 2022-07-19 DIAGNOSIS — E11.9 TYPE 2 DIABETES MELLITUS WITHOUT COMPLICATION, WITHOUT LONG-TERM CURRENT USE OF INSULIN (HCC): Primary | ICD-10-CM

## 2022-07-19 NOTE — TELEPHONE ENCOUNTER
Pt went to lab thinking there was labs to do for appt dylan  She mentioned sugars, and anything else hawks would want?      I told her we could do a1c in office if need be and we can give lab slips at appt dylan

## 2022-07-20 ENCOUNTER — APPOINTMENT (OUTPATIENT)
Dept: LAB | Facility: HOSPITAL | Age: 77
End: 2022-07-20
Payer: MEDICARE

## 2022-07-20 ENCOUNTER — OFFICE VISIT (OUTPATIENT)
Dept: INTERNAL MEDICINE CLINIC | Facility: CLINIC | Age: 77
End: 2022-07-20
Payer: MEDICARE

## 2022-07-20 VITALS
RESPIRATION RATE: 16 BRPM | SYSTOLIC BLOOD PRESSURE: 130 MMHG | HEIGHT: 65 IN | OXYGEN SATURATION: 97 % | BODY MASS INDEX: 30.56 KG/M2 | HEART RATE: 66 BPM | WEIGHT: 183.4 LBS | DIASTOLIC BLOOD PRESSURE: 78 MMHG | TEMPERATURE: 98 F

## 2022-07-20 DIAGNOSIS — E78.2 MIXED HYPERLIPIDEMIA: ICD-10-CM

## 2022-07-20 DIAGNOSIS — E11.9 TYPE 2 DIABETES MELLITUS WITHOUT COMPLICATION, WITHOUT LONG-TERM CURRENT USE OF INSULIN (HCC): Primary | ICD-10-CM

## 2022-07-20 DIAGNOSIS — E11.9 TYPE 2 DIABETES MELLITUS WITHOUT COMPLICATION, WITHOUT LONG-TERM CURRENT USE OF INSULIN (HCC): ICD-10-CM

## 2022-07-20 DIAGNOSIS — I10 HYPERTENSION, BENIGN: ICD-10-CM

## 2022-07-20 LAB
ALBUMIN SERPL BCP-MCNC: 3.4 G/DL (ref 3.5–5)
ALP SERPL-CCNC: 97 U/L (ref 46–116)
ALT SERPL W P-5'-P-CCNC: 17 U/L (ref 12–78)
ANION GAP SERPL CALCULATED.3IONS-SCNC: 13 MMOL/L (ref 4–13)
AST SERPL W P-5'-P-CCNC: 20 U/L (ref 5–45)
BASOPHILS # BLD AUTO: 0.02 THOUSANDS/ΜL (ref 0–0.1)
BASOPHILS NFR BLD AUTO: 1 % (ref 0–1)
BILIRUB SERPL-MCNC: 0.35 MG/DL (ref 0.2–1)
BUN SERPL-MCNC: 17 MG/DL (ref 5–25)
CALCIUM ALBUM COR SERPL-MCNC: 10.1 MG/DL (ref 8.3–10.1)
CALCIUM SERPL-MCNC: 9.6 MG/DL (ref 8.3–10.1)
CHLORIDE SERPL-SCNC: 105 MMOL/L (ref 96–108)
CHOLEST SERPL-MCNC: 163 MG/DL
CO2 SERPL-SCNC: 26 MMOL/L (ref 21–32)
CREAT SERPL-MCNC: 1.2 MG/DL (ref 0.6–1.3)
EOSINOPHIL # BLD AUTO: 0.08 THOUSAND/ΜL (ref 0–0.61)
EOSINOPHIL NFR BLD AUTO: 2 % (ref 0–6)
ERYTHROCYTE [DISTWIDTH] IN BLOOD BY AUTOMATED COUNT: 13.3 % (ref 11.6–15.1)
EST. AVERAGE GLUCOSE BLD GHB EST-MCNC: 154 MG/DL
GFR SERPL CREATININE-BSD FRML MDRD: 44 ML/MIN/1.73SQ M
GLUCOSE P FAST SERPL-MCNC: 79 MG/DL (ref 65–99)
HBA1C MFR BLD: 7 %
HCT VFR BLD AUTO: 39.5 % (ref 34.8–46.1)
HDLC SERPL-MCNC: 58 MG/DL
HGB BLD-MCNC: 12.7 G/DL (ref 11.5–15.4)
IMM GRANULOCYTES # BLD AUTO: 0.01 THOUSAND/UL (ref 0–0.2)
IMM GRANULOCYTES NFR BLD AUTO: 0 % (ref 0–2)
LDLC SERPL CALC-MCNC: 94 MG/DL (ref 0–100)
LYMPHOCYTES # BLD AUTO: 1.49 THOUSANDS/ΜL (ref 0.6–4.47)
LYMPHOCYTES NFR BLD AUTO: 42 % (ref 14–44)
MCH RBC QN AUTO: 29.6 PG (ref 26.8–34.3)
MCHC RBC AUTO-ENTMCNC: 32.2 G/DL (ref 31.4–37.4)
MCV RBC AUTO: 92 FL (ref 82–98)
MONOCYTES # BLD AUTO: 0.36 THOUSAND/ΜL (ref 0.17–1.22)
MONOCYTES NFR BLD AUTO: 10 % (ref 4–12)
NEUTROPHILS # BLD AUTO: 1.56 THOUSANDS/ΜL (ref 1.85–7.62)
NEUTS SEG NFR BLD AUTO: 45 % (ref 43–75)
NONHDLC SERPL-MCNC: 105 MG/DL
NRBC BLD AUTO-RTO: 0 /100 WBCS
PLATELET # BLD AUTO: 277 THOUSANDS/UL (ref 149–390)
PMV BLD AUTO: 9.8 FL (ref 8.9–12.7)
POTASSIUM SERPL-SCNC: 4.2 MMOL/L (ref 3.5–5.3)
PROT SERPL-MCNC: 7.8 G/DL (ref 6.4–8.4)
RBC # BLD AUTO: 4.29 MILLION/UL (ref 3.81–5.12)
SODIUM SERPL-SCNC: 144 MMOL/L (ref 135–147)
TRIGL SERPL-MCNC: 56 MG/DL
WBC # BLD AUTO: 3.52 THOUSAND/UL (ref 4.31–10.16)

## 2022-07-20 PROCEDURE — 85025 COMPLETE CBC W/AUTO DIFF WBC: CPT

## 2022-07-20 PROCEDURE — 36415 COLL VENOUS BLD VENIPUNCTURE: CPT

## 2022-07-20 PROCEDURE — 80053 COMPREHEN METABOLIC PANEL: CPT

## 2022-07-20 PROCEDURE — 80061 LIPID PANEL: CPT

## 2022-07-20 PROCEDURE — 99214 OFFICE O/P EST MOD 30 MIN: CPT | Performed by: INTERNAL MEDICINE

## 2022-07-20 PROCEDURE — 83036 HEMOGLOBIN GLYCOSYLATED A1C: CPT

## 2022-07-20 NOTE — PROGRESS NOTES
Assessment/Plan:       Appears to be doing okay  Will follow up on the remaining labs  Continue current medications  She is going to call GI for follow-up colonoscopy  Ordered blood work for next visit  Quality Measures:       Return in about 4 months (around 11/20/2022) for Recheck  No problem-specific Assessment & Plan notes found for this encounter  Diagnoses and all orders for this visit:    Type 2 diabetes mellitus without complication, without long-term current use of insulin ([facility])  -     Comprehensive metabolic panel; Future  -     CBC and differential; Future  -     Hemoglobin A1C; Future  -     Lipid panel; Future    Hypertension, benign    Mixed hyperlipidemia        Subjective:      Patient ID: Stephany Curran is a 68 y o  female  Patient comes in today for followup  She states she is doing okay  Her blood pressure is controlled  She did her blood work this morning  A1c and cholesterol are still pending  She has been trying to watch her diet  Taking her medicines as directed  Reports she is due for repeat colonoscopy  She has a prior history of polyps        ALLERGIES:  Allergies   Allergen Reactions    Nuts - Food Allergy Anaphylaxis     Raw Almonds only    Codeine Other (See Comments)     Dizzy, lightheaded       CURRENT MEDICATIONS:    Current Outpatient Medications:     Accu-Chek Guide test strip, CHECK SUGAR DAILY, Disp: 100 strip, Rfl: 2    amLODIPine (NORVASC) 5 mg tablet, TAKE 1 TABLET BY MOUTH EVERY DAY, Disp: 90 tablet, Rfl: 3    hyoscyamine (ANASPAZ,LEVSIN) 0 125 MG tablet, TAKE 1 TABLET BY MOUTH 3-4 TIMES DAILY AS NEEDED , Disp: 60 tablet, Rfl: 2    levocetirizine (XYZAL) 5 MG tablet, Take 5 mg by mouth as needed , Disp: , Rfl:     lovastatin (MEVACOR) 20 mg tablet, Take 1 tablet (20 mg total) by mouth daily at bedtime, Disp: 90 tablet, Rfl: 3    metFORMIN (GLUCOPHAGE) 1000 MG tablet, Take 1 tablet (1,000 mg total) by mouth 2 (two) times a day, Disp: 180 tablet, Rfl: 3    naproxen (NAPROSYN) 250 mg tablet, Take 1 tablet (250 mg total) by mouth 2 (two) times a day with meals, Disp: 20 tablet, Rfl: 0    quinapril (ACCUPRIL) 20 mg tablet, Take 1 tablet (20 mg total) by mouth daily at bedtime, Disp: 90 tablet, Rfl: 1    aspirin (ECOTRIN LOW STRENGTH) 81 mg EC tablet, Take 1 tablet (81 mg total) by mouth daily (Patient not taking: Reported on 7/20/2022), Disp: , Rfl: 0    ACTIVE PROBLEM LIST:  Patient Active Problem List   Diagnosis    Allergic rhinitis    Mixed hyperlipidemia    Hypertension, benign    Irritable bowel syndrome without diarrhea    Type 2 diabetes mellitus without complication, without long-term current use of insulin ([facility] )    Screening for malignant neoplasm of breast    Abnormal mammogram    Breast pain, right    Paresthesia of both feet    Bilateral cold feet    Chronic bilateral low back pain    Memory loss    Speech defect    Other headache syndrome       PAST MEDICAL HISTORY:  Past Medical History:   Diagnosis Date    Allergic     Arthritis     [facility] mellitus ([facility] )     Diverticulitis of colon     GERD (gastroesophageal reflux disease)     Headache(784 0)     HNP (herniated nucleus pulposus), lumbar     Hyperlipidemia     Hypertension     IBS (irritable bowel syndrome)     Osteoarthritis        PAST SURGICAL HISTORY:  Past Surgical History:   Procedure Laterality Date    PARATHYROID GLAND SURGERY      resolved 05/12    OH ESOPHAGOGASTRODUODENOSCOPY TRANSORAL DIAGNOSTIC N/A 4/19/2017    Procedure: EGD AND COLONOSCOPY;  Surgeon: Kathleen Gore MD;  Location: [place] [facility];   Service: Gastroenterology    UMBILICAL HERNIA REPAIR         FAMILY HISTORY:  Family History   Problem Relation Age of Onset    Breast cancer Mother     Heart defect Brother     Arthritis Family     Hypertension Family     No Known Problems Father     No Known Problems Brother        SOCIAL HISTORY:  Social History     Socioeconomic History  Marital status: /Civil Union     Spouse name: Not on file    Number of children: 2    Years of education: Not on file    Highest education level: Not on file   Occupational History    Occupation: 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[employer]     Comment: retired   Tobacco Use    Smoking status: Never Smoker    Smokeless tobacco: Never Used   Vaping Use    Vaping Use: Never used   Substance and Sexual Activity    Alcohol use: No    Drug use: No    Sexual activity: Yes     Partners: Male   Other Topics Concern    Not on file   Social History Narrative    Not on file     Social Determinants of Health     Financial Resource Strain: Not on file   Food Insecurity: Not on file   Transportation Needs: Not on file   Physical Activity: Not on file   Stress: Not on file   Social Connections: Not on file   Intimate Partner Violence: Not on file   Housing Stability: Not on file       Review of Systems   Respiratory: Negative for shortness of breath  Cardiovascular: Negative for chest pain  Gastrointestinal: Negative for abdominal pain  Objective:  Vitals:    07/20/22 1337   BP: 130/78   BP Location: Left arm   Patient Position: Sitting   Cuff Size: Adult   Pulse: 66   Resp: 16   Temp: 98 °F (36 7 °C)   TempSrc: Tympanic   SpO2: 97%   Weight: 83 2 kg (183 lb 6 4 oz)   Height: 5' 5" (1 651 m)     Body mass index is 30 52 kg/m²  Physical Exam  Vitals and nursing note reviewed  Constitutional:       Appearance: She is well-developed  Cardiovascular:      Rate and Rhythm: Normal rate and regular rhythm  Heart sounds: Normal heart sounds  Pulmonary:      Effort: Pulmonary effort is normal       Breath sounds: Normal breath sounds  Abdominal:      Palpations: Abdomen is soft  Tenderness: There is no abdominal tenderness  Neurological:      Mental Status: She is alert and oriented to person, place, and time             RESULTS:    Recent Results (from the past 1008 hour(s))   Comprehensive metabolic panel    Collection Time: 07/20/22 12:43 PM   Result Value Ref Range    Sodium 144 135 - 147 mmol/L    Potassium 4 2 3 5 - 5 3 mmol/L    Chloride 105 96 - 108 mmol/L    CO2 26 21 - 32 mmol/L    ANION GAP 13 4 - 13 mmol/L    BUN 17 5 - 25 mg/dL    Creatinine 1 20 0 60 - 1 30 mg/dL    Glucose, Fasting 79 65 - 99 mg/dL    Calcium 9 6 8 3 - 10 1 mg/dL    Corrected Calcium 10 1 8 3 - 10 1 mg/dL    AST 20 5 - 45 U/L    ALT 17 12 - 78 U/L    Alkaline Phosphatase 97 46 - 116 U/L    Total Protein 7 8 6 4 - 8 4 g/dL    Albumin 3 4 (L) 3 5 - 5 0 g/dL    Total Bilirubin 0 35 0 20 - 1 00 mg/dL    eGFR 44 ml/min/1 73sq m   CBC and differential    Collection Time: 07/20/22 12:43 PM   Result Value Ref Range    WBC 3 52 (L) 4 31 - 10 16 Thousand/uL    RBC 4 29 3 81 - 5 12 Million/uL    Hemoglobin 12 7 11 5 - 15 4 g/dL    Hematocrit 39 5 34 8 - 46 1 %    MCV 92 82 - 98 fL    MCH 29 6 26 8 - 34 3 pg    MCHC 32 2 31 4 - 37 4 g/dL    RDW 13 3 11 6 - 15 1 %    MPV 9 8 8 9 - 12 7 fL    Platelets 289 893 - 728 Thousands/uL    nRBC 0 /100 WBCs    Neutrophils Relative 45 43 - 75 %    Immat GRANS % 0 0 - 2 %    Lymphocytes Relative 42 14 - 44 %    Monocytes Relative 10 4 - 12 %    Eosinophils Relative 2 0 - 6 %    Basophils Relative 1 0 - 1 %    Neutrophils Absolute 1 56 (L) 1 85 - 7 62 Thousands/µL    Immature Grans Absolute 0 01 0 00 - 0 20 Thousand/uL    Lymphocytes Absolute 1 49 0 60 - 4 47 Thousands/µL    Monocytes Absolute 0 36 0 17 - 1 22 Thousand/µL    Eosinophils Absolute 0 08 0 00 - 0 61 Thousand/µL    Basophils Absolute 0 02 0 00 - 0 10 Thousands/µL   Lipid panel    Collection Time: 07/20/22 12:43 PM   Result Value Ref Range    Cholesterol 163 See Comment mg/dL    Triglycerides 56 See Comment mg/dL    HDL, Direct 58 >=50 mg/dL    LDL Calculated 94 0 - 100 mg/dL    Non-HDL-Chol (CHOL-HDL) 105 mg/dl       This note was created with voice recognition software    Phonic, grammatical and spelling errors may be present within the note as a result

## 2022-08-02 ENCOUNTER — HOSPITAL ENCOUNTER (OUTPATIENT)
Dept: BONE DENSITY | Facility: CLINIC | Age: 77
Discharge: HOME | End: 2022-08-02
Payer: MEDICARE

## 2022-08-02 DIAGNOSIS — Z78.0 POSTMENOPAUSAL: ICD-10-CM

## 2022-08-02 PROCEDURE — 77080 DXA BONE DENSITY AXIAL: CPT

## 2022-08-19 DIAGNOSIS — E11.9 TYPE 2 DIABETES MELLITUS WITHOUT COMPLICATION, WITHOUT LONG-TERM CURRENT USE OF INSULIN (HCC): ICD-10-CM

## 2022-10-21 ENCOUNTER — TELEPHONE (OUTPATIENT)
Dept: ADMINISTRATIVE | Facility: OTHER | Age: 77
End: 2022-10-21

## 2022-10-21 NOTE — TELEPHONE ENCOUNTER
----- Message from Juan Turner, [address] sent at 10/21/2022 11:16 AM EDT -----  Regarding: mammo  10/21/22 11:16 AM    Hello, our patient attached above has had Mammogram completed/performed  Please assist in updating the patient chart by pulling the Care Everywhere (CE) document  The date of service is 5/16/22       Thank you,  Juan [facility] Betza[facility] Pearson

## 2022-10-24 ENCOUNTER — OFFICE VISIT (OUTPATIENT)
Dept: INTERNAL MEDICINE CLINIC | Facility: CLINIC | Age: 77
End: 2022-10-24
Payer: MEDICARE

## 2022-10-24 VITALS
OXYGEN SATURATION: 97 % | DIASTOLIC BLOOD PRESSURE: 68 MMHG | BODY MASS INDEX: 28.82 KG/M2 | HEART RATE: 82 BPM | WEIGHT: 173 LBS | SYSTOLIC BLOOD PRESSURE: 120 MMHG | RESPIRATION RATE: 14 BRPM | HEIGHT: 65 IN

## 2022-10-24 DIAGNOSIS — H25.13 NUCLEAR SCLEROTIC CATARACT OF BOTH EYES: ICD-10-CM

## 2022-10-24 DIAGNOSIS — Z01.818 PRE-OP EXAMINATION: Primary | ICD-10-CM

## 2022-10-24 DIAGNOSIS — Z23 NEED FOR INFLUENZA VACCINATION: ICD-10-CM

## 2022-10-24 PROCEDURE — 90662 IIV NO PRSV INCREASED AG IM: CPT | Performed by: INTERNAL MEDICINE

## 2022-10-24 PROCEDURE — G0008 ADMIN INFLUENZA VIRUS VAC: HCPCS | Performed by: INTERNAL MEDICINE

## 2022-10-24 PROCEDURE — 99214 OFFICE O/P EST MOD 30 MIN: CPT | Performed by: INTERNAL MEDICINE

## 2022-10-24 NOTE — PROGRESS NOTES
Presurgical Evaluation    Subjective:      Patient ID: Lilia Caldera is a 68 y o  female  Chief Complaint   Patient presents with   • Pre-op Exam         Patient comes in today for preop evaluation for bilateral cataract surgery secondary to worsening vision  Her chronic medical problems are stable  The following portions of the patient's history were reviewed and updated as appropriate: allergies, current medications, past family history, past medical history, past social history, past surgical history and problem list     Procedure date: 11/2, 11/16    Surgeon:  Dr Demetrio Muniz  Planned procedure:  Cataract extraction  Diagnosis for procedure:  Cataracts    Prior anesthesia: Yes   General; Complications:  None / Tolerated well    CAD History: None   NOTE: Patient should see Cardiology if time available before surgery, and if appropriate  Pulmonary History: None    Renal history: None    Diabetes History:  Type 2  Controlled     Neurological History: None     On Immunosuppressant meds/biologics: No      Review of Systems   Constitutional: Negative for fever  Respiratory: Negative for cough, shortness of breath and wheezing  Cardiovascular: Negative for chest pain and palpitations  Gastrointestinal: Negative for abdominal pain  Skin: Negative for rash  Hematological: Does not bruise/bleed easily  Current Outpatient Medications   Medication Sig Dispense Refill   • Accu-Chek Guide test strip CHECK SUGAR DAILY 100 strip 2   • amLODIPine (NORVASC) 5 mg tablet TAKE 1 TABLET BY MOUTH EVERY DAY 90 tablet 3   • hyoscyamine (ANASPAZ,LEVSIN) 0 125 MG tablet TAKE 1 TABLET BY MOUTH 3-4 TIMES DAILY AS NEEDED   60 tablet 2   • levocetirizine (XYZAL) 5 MG tablet Take 5 mg by mouth as needed      • lovastatin (MEVACOR) 20 mg tablet Take 1 tablet (20 mg total) by mouth daily at bedtime 90 tablet 3   • metFORMIN (GLUCOPHAGE) 1000 MG tablet TAKE 1 TABLET BY MOUTH TWICE A  tablet 3   • naproxen (NAPROSYN) 250 mg tablet Take 1 tablet (250 mg total) by mouth 2 (two) times a day with meals 20 tablet 0   • quinapril (ACCUPRIL) 20 mg tablet TAKE 1 TABLET BY MOUTH DAILY AT BEDTIME 90 tablet 3   • aspirin (ECOTRIN LOW STRENGTH) 81 mg EC tablet Take 1 tablet (81 mg total) by mouth daily (Patient not taking: No sig reported)  0     No current facility-administered medications for this visit  Allergies on file:   Nuts - food allergy and Codeine    Patient Active Problem List   Diagnosis   • Allergic rhinitis   • Mixed hyperlipidemia   • Hypertension, benign   • Irritable bowel syndrome without diarrhea   • Type 2 diabetes mellitus without complication, without long-term current use of insulin ([facility])   • Screening for malignant neoplasm of breast   • Abnormal mammogram   • Breast pain, right   • Paresthesia of both feet   • Bilateral cold feet   • Chronic bilateral low back pain   • Memory loss   • Speech defect   • Other headache syndrome        Past Medical History:   Diagnosis Date   • Allergic    • Arthritis    • Diabetes mellitus ([address] )    • Diverticulitis of colon    • GERD (gastroesophageal reflux disease)    • Headache(784 0)    • HNP (herniated nucleus pulposus), lumbar    • Hyperlipidemia    • Hypertension    • IBS (irritable bowel syndrome)    • Osteoarthritis        Past Surgical History:   Procedure Laterality Date   • PARATHYROID GLAND SURGERY      resolved 05/12   • [place] ESOPHAGOGASTRODUODENOSCOPY TRANSORAL DIAGNOSTIC N/A 4/19/2017    Procedure: EGD AND COLONOSCOPY;  Surgeon: Johnie Silva MD;  Location: [facility];   Service: Gastroenterology   • UMBILICAL HERNIA REPAIR         Family History   Problem Relation Age of Onset   • Breast cancer Mother    • Heart defect Brother    • Arthritis Family    • Hypertension Family    • No Known Problems Father    • No Known Problems Brother        Social History     Tobacco Use   • Smoking status: Never Smoker   • Smokeless tobacco: Never Used   Vaping Use   • Vaping Use: Never used   Substance Use Topics   • Alcohol use: No   • Drug use: No       Objective:    Vitals:    10/24/22 1423   BP: 120/68   BP Location: Left arm   Patient Position: Sitting   Pulse: 82   Resp: 14   SpO2: 97%   Weight: 78 5 kg (173 lb)   Height: 5' 5" (1 651 m)        Physical Exam  Vitals and nursing note reviewed  Constitutional:       Appearance: She is well-developed  HENT:      Right Ear: External ear normal       Left Ear: External ear normal       Nose: Nose normal    Eyes:      Conjunctiva/sclera: Conjunctivae normal       Pupils: Pupils are equal, round, and reactive to light  Neck:      Vascular: No carotid bruit  Cardiovascular:      Rate and Rhythm: Normal rate and regular rhythm  Heart sounds: Normal heart sounds  Pulmonary:      Effort: Pulmonary effort is normal       Breath sounds: Normal breath sounds  Abdominal:      General: Bowel sounds are normal       Palpations: Abdomen is soft  Musculoskeletal:      Cervical back: Neck supple  Skin:     Findings: No rash  Neurological:      Mental Status: She is alert and oriented to person, place, and time  Preop labs/testing available and reviewed: no               EKG no    Echo no    Stress test/cath no    PFT/[place]uk no    Functional capacity: Walking , 4-5 MPH               4 Mets   Pick the highest level patient can comfortably perform   4 mets or greater for surgery    RCRI  High Risk surgery? 1 Point  CAD History:         1 Point   MI; Positive Stress Test; CP due to Mi;  Nitrate Usage to control Angina;  Pathologic Q wave on EKG  CHF Active:         1 Point   Pulm Edema; Paroxysmal Nocturnal Dyspnea;  Bibasilar Rales (crackles);S3; CHF on CXR  Cerebrovascular Disease (TIA or CVA):     1 Point  DM on Insulin:        1 Point  Serum Creat >2 0 mg/dl:       1 Point          Total Points: 0     Scorin: Class I, Very Low Risk (0 4%)     1: Class II, Low risk (0 9%)     2: Class III Moderate (6 6%)     3: Class IV High (>11%)      WELL[place] Risk:  GFR:        For PCP:  If GFR>60, Hold ACE/ARB/Diuretic on the day of surgery, and NSAIDS 10 days before  If GFR<45, Consider PRE and POST op Nephrology Consult  If 46 <GFR> 59 : Has Patient had WELLINGTON in last 6 Months? no   If YES: Preop Nephrology consult   If No:  [address] Nephrology consult  Assessment/Plan:    Patient is medically optimized (cleared) for the planned procedure  Further testing/evaluation is not required  Postop concerns: no    Problem List Items Addressed This Visit    None     Visit Diagnoses     Pre-op examination    -  Primary    Nuclear sclerotic cataract of both eyes        Need for influenza vaccination        Relevant Orders    influenza vaccine, high-dose, PF 0 7 mL (FLUZONE HIGH-DOSE)           Diagnoses and all orders for this visit:    Pre-op examination    Nuclear sclerotic cataract of both eyes    Need for influenza vaccination  -     influenza vaccine, high-dose, PF 0 7 mL (FLUZONE HIGH-DOSE)        Pre-Surgery Instructions:   Medication Instructions   • Accu-Chek Guide test strip per anesthesia guidelines    • amLODIPine (NORVASC) 5 mg tablet Take morning of surgery   • hyoscyamine (ANASPAZ,LEVSIN) 0 125 MG tablet per anesthesia guidelines    • levocetirizine (XYZAL) 5 MG tablet per anesthesia guidelines    • lovastatin (MEVACOR) 20 mg tablet per anesthesia guidelines    • metFORMIN (GLUCOPHAGE) 1000 MG tablet per anesthesia guidelines    • naproxen (NAPROSYN) 250 mg tablet Stop taking 1 week prior to surgery   • quinapril (ACCUPRIL) 20 mg tablet Take morning of surgery        NOTE: Please use the above to review important meds for your specialty, the remainder "per anesthesia Guidelines "    NOTE: Please place an Inbasket message for "[facility]" pool for complicated patients

## 2022-10-24 NOTE — TELEPHONE ENCOUNTER
Upon review of the In Basket request we were able to locate, review, and update the patient chart as requested for Mammogram     Any additional questions or concerns should be emailed to the Practice Liaisons via the appropriate education email address, please do not reply via In Basket      Thank you  Oriana Russo

## 2022-11-17 ENCOUNTER — APPOINTMENT (OUTPATIENT)
Dept: LAB | Facility: CLINIC | Age: 77
End: 2022-11-17

## 2022-11-17 ENCOUNTER — OFFICE VISIT (OUTPATIENT)
Dept: INTERNAL MEDICINE CLINIC | Facility: CLINIC | Age: 77
End: 2022-11-17

## 2022-11-17 VITALS
DIASTOLIC BLOOD PRESSURE: 80 MMHG | RESPIRATION RATE: 16 BRPM | BODY MASS INDEX: 29.16 KG/M2 | TEMPERATURE: 98 F | SYSTOLIC BLOOD PRESSURE: 148 MMHG | HEIGHT: 65 IN | WEIGHT: 175 LBS | OXYGEN SATURATION: 99 % | HEART RATE: 64 BPM

## 2022-11-17 DIAGNOSIS — Z98.49 STATUS POST CATARACT EXTRACTION, UNSPECIFIED LATERALITY: ICD-10-CM

## 2022-11-17 DIAGNOSIS — I49.9 CARDIAC ARRHYTHMIA, UNSPECIFIED CARDIAC ARRHYTHMIA TYPE: ICD-10-CM

## 2022-11-17 DIAGNOSIS — I49.9 CARDIAC ARRHYTHMIA, UNSPECIFIED CARDIAC ARRHYTHMIA TYPE: Primary | ICD-10-CM

## 2022-11-17 DIAGNOSIS — E11.9 TYPE 2 DIABETES MELLITUS WITHOUT COMPLICATION, WITHOUT LONG-TERM CURRENT USE OF INSULIN (HCC): ICD-10-CM

## 2022-11-17 LAB
ALBUMIN SERPL BCP-MCNC: 3.2 G/DL (ref 3.5–5)
ALP SERPL-CCNC: 107 U/L (ref 46–116)
ALT SERPL W P-5'-P-CCNC: 20 U/L (ref 12–78)
ANION GAP SERPL CALCULATED.3IONS-SCNC: 5 MMOL/L (ref 4–13)
AST SERPL W P-5'-P-CCNC: 14 U/L (ref 5–45)
BASOPHILS # BLD AUTO: 0.04 THOUSANDS/ÂΜL (ref 0–0.1)
BASOPHILS NFR BLD AUTO: 1 % (ref 0–1)
BILIRUB SERPL-MCNC: 0.4 MG/DL (ref 0.2–1)
BUN SERPL-MCNC: 24 MG/DL (ref 5–25)
CALCIUM ALBUM COR SERPL-MCNC: 10.4 MG/DL (ref 8.3–10.1)
CALCIUM SERPL-MCNC: 9.8 MG/DL (ref 8.3–10.1)
CHLORIDE SERPL-SCNC: 109 MMOL/L (ref 96–108)
CHOLEST SERPL-MCNC: 172 MG/DL
CO2 SERPL-SCNC: 26 MMOL/L (ref 21–32)
CREAT SERPL-MCNC: 1.19 MG/DL (ref 0.6–1.3)
EOSINOPHIL # BLD AUTO: 0.07 THOUSAND/ÂΜL (ref 0–0.61)
EOSINOPHIL NFR BLD AUTO: 2 % (ref 0–6)
ERYTHROCYTE [DISTWIDTH] IN BLOOD BY AUTOMATED COUNT: 14 % (ref 11.6–15.1)
EST. AVERAGE GLUCOSE BLD GHB EST-MCNC: 131 MG/DL
GFR SERPL CREATININE-BSD FRML MDRD: 44 ML/MIN/1.73SQ M
GLUCOSE P FAST SERPL-MCNC: 90 MG/DL (ref 65–99)
HBA1C MFR BLD: 6.2 %
HCT VFR BLD AUTO: 41.7 % (ref 34.8–46.1)
HDLC SERPL-MCNC: 59 MG/DL
HGB BLD-MCNC: 13.1 G/DL (ref 11.5–15.4)
IMM GRANULOCYTES # BLD AUTO: 0 THOUSAND/UL (ref 0–0.2)
IMM GRANULOCYTES NFR BLD AUTO: 0 % (ref 0–2)
LDLC SERPL CALC-MCNC: 100 MG/DL (ref 0–100)
LYMPHOCYTES # BLD AUTO: 1.69 THOUSANDS/ÂΜL (ref 0.6–4.47)
LYMPHOCYTES NFR BLD AUTO: 43 % (ref 14–44)
MAGNESIUM SERPL-MCNC: 2 MG/DL (ref 1.6–2.6)
MCH RBC QN AUTO: 29 PG (ref 26.8–34.3)
MCHC RBC AUTO-ENTMCNC: 31.4 G/DL (ref 31.4–37.4)
MCV RBC AUTO: 92 FL (ref 82–98)
MONOCYTES # BLD AUTO: 0.48 THOUSAND/ÂΜL (ref 0.17–1.22)
MONOCYTES NFR BLD AUTO: 12 % (ref 4–12)
NEUTROPHILS # BLD AUTO: 1.61 THOUSANDS/ÂΜL (ref 1.85–7.62)
NEUTS SEG NFR BLD AUTO: 42 % (ref 43–75)
NONHDLC SERPL-MCNC: 113 MG/DL
NRBC BLD AUTO-RTO: 0 /100 WBCS
PLATELET # BLD AUTO: 245 THOUSANDS/UL (ref 149–390)
PMV BLD AUTO: 10.5 FL (ref 8.9–12.7)
POTASSIUM SERPL-SCNC: 4.3 MMOL/L (ref 3.5–5.3)
PROT SERPL-MCNC: 7.9 G/DL (ref 6.4–8.4)
RBC # BLD AUTO: 4.52 MILLION/UL (ref 3.81–5.12)
SODIUM SERPL-SCNC: 140 MMOL/L (ref 135–147)
TRIGL SERPL-MCNC: 63 MG/DL
WBC # BLD AUTO: 3.89 THOUSAND/UL (ref 4.31–10.16)

## 2022-11-17 NOTE — PROGRESS NOTES
Assessment/Plan:       Rhythm strip shows a few PVCs  Will check a Holter monitor and electrolytes  Quality Measures:       No follow-ups on file  No problem-specific Assessment & Plan notes found for this encounter  Diagnoses and all orders for this visit:    Cardiac arrhythmia, unspecified cardiac arrhythmia type  -     Holter monitor; Future  -     Basic metabolic panel; Future  -     Magnesium; Future    Status post cataract extraction, unspecified laterality        Subjective:      Patient ID: Malcolm Goldmann is a 68 y o  female  Patient comes in today with her  because after her cataract surgery yesterday in the recovery room, she was noted to have a few PVCs on the monitor  Rhythm strip was pulled  She felt fine  No history of heart disease  No trouble since  No trouble with the 1st eye surgery  Still feels fine        ALLERGIES:  Allergies   Allergen Reactions   • Nuts - Food Allergy Anaphylaxis     Raw Almonds only   • Codeine Other (See Comments)     Dizzy, lightheaded       CURRENT MEDICATIONS:    Current Outpatient Medications:   •  Accu-Chek Guide test strip, CHECK SUGAR DAILY, Disp: 100 strip, Rfl: 2  •  amLODIPine (NORVASC) 5 mg tablet, TAKE 1 TABLET BY MOUTH EVERY DAY, Disp: 90 tablet, Rfl: 3  •  hyoscyamine (ANASPAZ,LEVSIN) 0 125 MG tablet, TAKE 1 TABLET BY MOUTH 3-4 TIMES DAILY AS NEEDED , Disp: 60 tablet, Rfl: 2  •  levocetirizine (XYZAL) 5 MG tablet, Take 5 mg by mouth as needed , Disp: , Rfl:   •  lovastatin (MEVACOR) 20 mg tablet, Take 1 tablet (20 mg total) by mouth daily at bedtime, Disp: 90 tablet, Rfl: 3  •  metFORMIN (GLUCOPHAGE) 1000 MG tablet, TAKE 1 TABLET BY MOUTH TWICE A DAY, Disp: 180 tablet, Rfl: 3  •  naproxen (NAPROSYN) 250 mg tablet, Take 1 tablet (250 mg total) by mouth 2 (two) times a day with meals, Disp: 20 tablet, Rfl: 0  •  quinapril (ACCUPRIL) 20 mg tablet, TAKE 1 TABLET BY MOUTH DAILY AT BEDTIME, Disp: 90 tablet, Rfl: 3  •  aspirin (ECOTRIN LOW STRENGTH) 81 mg EC tablet, Take 1 tablet (81 mg total) by mouth daily (Patient not taking: Reported on 7/20/2022), Disp: , Rfl: 0    ACTIVE PROBLEM LIST:  Patient Active Problem List   Diagnosis   • Allergic rhinitis   • Mixed hyperlipidemia   • Hypertension, benign   • Irritable bowel syndrome without diarrhea   • Type 2 diabetes mellitus without complication, without long-term current use of insulin (HCC)   • Screening for malignant neoplasm of breast   • Abnormal mammogram   • Breast pain, right   • Paresthesia of both feet   • Bilateral cold feet   • Chronic bilateral low back pain   • Memory loss   • Speech defect   • Other headache syndrome       PAST MEDICAL HISTORY:  Past Medical History:   Diagnosis Date   • Allergic    • Arthritis    • Diabetes mellitus ([address] )    • Diverticulitis of colon    • GERD (gastroesophageal reflux disease)    • Headache(784 0)    • HNP (herniated nucleus pulposus), lumbar    • Hyperlipidemia    • Hypertension    • IBS (irritable bowel syndrome)    • Osteoarthritis        PAST SURGICAL HISTORY:  Past Surgical History:   Procedure Laterality Date   • PARATHYROID GLAND SURGERY      resolved 05/12   • [place] ESOPHAGOGASTRODUODENOSCOPY TRANSORAL DIAGNOSTIC N/A 4/19/2017    Procedure: EGD AND COLONOSCOPY;  Surgeon: Molly Ortiz MD;  Location: [facility];   Service: Gastroenterology   • UMBILICAL HERNIA REPAIR         FAMILY HISTORY:  Family History   Problem Relation Age of Onset   • Breast cancer Mother    • Heart defect Brother    • Arthritis Family    • Hypertension Family    • No Known Problems Father    • No Known Problems Brother        SOCIAL HISTORY:  Social History     Socioeconomic History   • Marital status: /Civil Union     Spouse name: Not on file   • Number of children: 2   • Years of education: Not on file   • Highest education level: Not on file   Occupational History   • 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   Tobacco Use   • Smoking status: Never   • Smokeless tobacco: Never   Vaping Use   • Vaping Use: Never used   Substance and Sexual Activity   • Alcohol use: No   • Drug use: No   • Sexual activity: Yes     Partners: Male   Other Topics Concern   • Not on file   Social History Narrative   • Not on file     Social Determinants of Health     Financial Resource Strain: Not on file   Food Insecurity: Not on file   Transportation Needs: Not on file   Physical Activity: Not on file   Stress: Not on file   Social Connections: Not on file   Intimate Partner Violence: Not on file   Housing Stability: Not on file       Review of Systems   Respiratory: Negative for shortness of breath  Cardiovascular: Negative for chest pain and palpitations  Objective:  Vitals:    11/17/22 1302   BP: 148/80   BP Location: Left arm   Pulse: 64   Resp: 16   Temp: 98 °F (36 7 °C)   TempSrc: Tympanic   SpO2: 99%   Weight: 79 4 kg (175 lb)   Height: 5' 5" (1 651 m)     Body mass index is 29 12 kg/m²  Physical Exam  Constitutional:       Appearance: Normal appearance  Cardiovascular:      Rate and Rhythm: Normal rate and regular rhythm  Heart sounds: Normal heart sounds  Neurological:      Mental Status: She is alert  RESULTS:    No results found for this or any previous visit (from the past 1008 hour(s))  This note was created with voice recognition software  Phonic, grammatical and spelling errors may be present within the note as a result

## 2022-11-18 ENCOUNTER — HOSPITAL ENCOUNTER (OUTPATIENT)
Dept: NON INVASIVE DIAGNOSTICS | Facility: CLINIC | Age: 77
Discharge: HOME | End: 2022-11-18

## 2022-11-18 DIAGNOSIS — I49.9 CARDIAC ARRHYTHMIA, UNSPECIFIED CARDIAC ARRHYTHMIA TYPE: ICD-10-CM

## 2022-11-22 ENCOUNTER — RA CDI HCC (OUTPATIENT)
Dept: OTHER | Facility: HOSPITAL | Age: 77
End: 2022-11-22

## 2022-11-22 NOTE — PROGRESS NOTES
E11 22  [facility]  coding opportunities          Chart Reviewed number of suggestions sent to Provider: 1     Patients Insurance     Medicare Insurance: [employer]

## 2022-12-01 ENCOUNTER — OFFICE VISIT (OUTPATIENT)
Dept: INTERNAL MEDICINE CLINIC | Facility: CLINIC | Age: 77
End: 2022-12-01

## 2022-12-01 VITALS
DIASTOLIC BLOOD PRESSURE: 72 MMHG | HEART RATE: 77 BPM | WEIGHT: 177 LBS | HEIGHT: 65 IN | RESPIRATION RATE: 14 BRPM | OXYGEN SATURATION: 99 % | BODY MASS INDEX: 29.49 KG/M2 | SYSTOLIC BLOOD PRESSURE: 120 MMHG

## 2022-12-01 DIAGNOSIS — E11.9 TYPE 2 DIABETES MELLITUS WITHOUT COMPLICATION, WITHOUT LONG-TERM CURRENT USE OF INSULIN (HCC): ICD-10-CM

## 2022-12-01 DIAGNOSIS — I49.9 CARDIAC ARRHYTHMIA, UNSPECIFIED CARDIAC ARRHYTHMIA TYPE: Primary | ICD-10-CM

## 2022-12-01 NOTE — PROGRESS NOTES
Assessment/Plan:       Numerous extra beats  Will discuss with Cardiology but she is asymptomatic  Quality Measures: BMI Counseling: Body mass index is 29 45 kg/m²  The BMI is above normal  Nutrition recommendations include encouraging healthy choices of fruits and vegetables  Rationale for BMI follow-up plan is due to patient being overweight or obese  Depression Screening and Follow-up Plan: Patient was screened for depression during today's encounter  They screened negative with a PHQ-2 score of 0  Return in about 3 months (around 3/1/2023) for Recheck  No problem-specific Assessment & Plan notes found for this encounter  Diagnoses and all orders for this visit:    Cardiac arrhythmia, unspecified cardiac arrhythmia type    Type 2 diabetes mellitus without complication, without long-term current use of insulin ([facility])  -     Comprehensive metabolic panel; Future  -     CBC and differential; Future  -     Hemoglobin A1C; Future  -     Lipid panel; Future  -     Microalbumin / creatinine urine ratio; Future        Subjective:      Patient ID: Kaila Balbuena is a 68 y o  female  Patient comes in today  She has had no episodes of palpitations still  Her labs were within acceptable limits  Holter did reveal multiple extra beats  No pauses        ALLERGIES:  Allergies   Allergen Reactions   • Nuts - Food Allergy Anaphylaxis     Raw Almonds only   • Codeine Other (See Comments)     Dizzy, lightheaded       CURRENT MEDICATIONS:    Current Outpatient Medications:   •  Accu-Chek Guide test strip, CHECK SUGAR DAILY, Disp: 100 strip, Rfl: 2  •  amLODIPine (NORVASC) 5 mg tablet, TAKE 1 TABLET BY MOUTH EVERY DAY, Disp: 90 tablet, Rfl: 3  •  hyoscyamine (ANASPAZ,LEVSIN) 0 125 MG tablet, TAKE 1 TABLET BY MOUTH 3-4 TIMES DAILY AS NEEDED , Disp: 60 tablet, Rfl: 2  •  levocetirizine (XYZAL) 5 MG tablet, Take 5 mg by mouth as needed , Disp: , Rfl:   •  lovastatin (MEVACOR) 20 mg tablet, Take 1 tablet (20 mg total) by mouth daily at bedtime, Disp: 90 tablet, Rfl: 3  •  metFORMIN (GLUCOPHAGE) 1000 MG tablet, TAKE 1 TABLET BY MOUTH TWICE A DAY, Disp: 180 tablet, Rfl: 3  •  naproxen (NAPROSYN) 250 mg tablet, Take 1 tablet (250 mg total) by mouth 2 (two) times a day with meals (Patient taking differently: Take 250 mg by mouth if needed), Disp: 20 tablet, Rfl: 0  •  quinapril (ACCUPRIL) 20 mg tablet, TAKE 1 TABLET BY MOUTH DAILY AT BEDTIME, Disp: 90 tablet, Rfl: 3  •  aspirin (ECOTRIN LOW STRENGTH) 81 mg EC tablet, Take 1 tablet (81 mg total) by mouth daily (Patient not taking: Reported on 7/20/2022), Disp: , Rfl: 0    ACTIVE PROBLEM LIST:  Patient Active Problem List   Diagnosis   • Allergic rhinitis   • Mixed hyperlipidemia   • Hypertension, benign   • Irritable bowel syndrome without diarrhea   • Type 2 diabetes mellitus without complication, without long-term current use of insulin (HCC)   • Screening for malignant neoplasm of breast   • Abnormal mammogram   • Breast pain, right   • Paresthesia of both feet   • Bilateral cold feet   • Chronic bilateral low back pain   • Memory loss   • Speech defect   • Other headache syndrome       PAST MEDICAL HISTORY:  Past Medical History:   Diagnosis Date   • Allergic    • Arthritis    • Diabetes mellitus ([facility] [address] )    • Diverticulitis of colon    • GERD (gastroesophageal reflux disease)    • Headache(784 0)    • HNP (herniated nucleus pulposus), lumbar    • Hyperlipidemia    • Hypertension    • IBS (irritable bowel syndrome)    • Osteoarthritis        PAST SURGICAL HISTORY:  Past Surgical History:   Procedure Laterality Date   • PARATHYROID GLAND SURGERY      resolved 05/12   • [place] ESOPHAGOGASTRODUODENOSCOPY TRANSORAL DIAGNOSTIC N/A 4/19/2017    Procedure: EGD AND COLONOSCOPY;  Surgeon: Elidia Liu MD;  Location: [facility];   Service: Gastroenterology   • UMBILICAL HERNIA REPAIR         FAMILY HISTORY:  Family History   Problem Relation Age of Onset   • Breast cancer Mother    • Heart defect Brother    • Arthritis Family    • Hypertension Family    • No Known Problems Father    • No Known Problems Brother        SOCIAL HISTORY:  Social History     Socioeconomic History   • Marital status: /Civil Union     Spouse name: Not on file   • Number of children: 2   • Years of education: Not on file   • Highest education level: Not on file   Occupational History   • 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   Tobacco Use   • Smoking status: Never   • Smokeless tobacco: Never   Vaping Use   • Vaping Use: Never used   Substance and Sexual Activity   • Alcohol use: No   • Drug use: No   • Sexual activity: Yes     Partners: Male   Other Topics Concern   • Not on file   Social History Narrative   • Not on file     Social Determinants of Health     Financial Resource Strain: Not on file   Food Insecurity: Not on file   Transportation Needs: Not on file   Physical Activity: Not on file   Stress: Not on file   Social Connections: Not on file   Intimate Partner Violence: Not on file   Housing Stability: Not on file       Review of Systems   Respiratory: Negative for shortness of breath  Cardiovascular: Negative for chest pain and palpitations  Gastrointestinal: Negative for abdominal pain  Objective:  Vitals:    12/01/22 1434   BP: 120/72   BP Location: Left arm   Patient Position: Sitting   Pulse: 77   Resp: 14   SpO2: 99%   Weight: 80 3 kg (177 lb)   Height: 5' 5" (1 651 m)     Body mass index is 29 45 kg/m²  Physical Exam  Vitals and nursing note reviewed  Constitutional:       Appearance: Normal appearance  She is well-developed and well-nourished  Cardiovascular:      Rate and Rhythm: Normal rate and regular rhythm  Heart sounds: Normal heart sounds  Pulmonary:      Effort: Pulmonary effort is normal       Breath sounds: Normal breath sounds  Neurological:      Mental Status: She is alert             RESULTS:    Recent Results (from the past 1008 hour(s))   Comprehensive metabolic panel    Collection Time: 11/17/22  2:03 PM   Result Value Ref Range    Sodium 140 135 - 147 mmol/L    Potassium 4 3 3 5 - 5 3 mmol/L    Chloride 109 (H) 96 - 108 mmol/L    CO2 26 21 - 32 mmol/L    ANION GAP 5 4 - 13 mmol/L    BUN 24 5 - 25 mg/dL    Creatinine 1 19 0 60 - 1 30 mg/dL    Glucose, Fasting 90 65 - 99 mg/dL    Calcium 9 8 8 3 - 10 1 mg/dL    Corrected Calcium 10 4 (H) 8 3 - 10 1 mg/dL    AST 14 5 - 45 U/L    ALT 20 12 - 78 U/L    Alkaline Phosphatase 107 46 - 116 U/L    Total Protein 7 9 6 4 - 8 4 g/dL    Albumin 3 2 (L) 3 5 - 5 0 g/dL    Total Bilirubin 0 40 0 20 - 1 00 mg/dL    eGFR 44 ml/min/1 73sq m   CBC and differential    Collection Time: 11/17/22  2:03 PM   Result Value Ref Range    WBC 3 89 (L) 4 31 - 10 16 Thousand/uL    RBC 4 52 3 81 - 5 12 Million/uL    Hemoglobin 13 1 11 5 - 15 4 g/dL    Hematocrit 41 7 34 8 - 46 1 %    MCV 92 82 - 98 fL    MCH 29 0 26 8 - 34 3 pg    MCHC 31 4 31 4 - 37 4 g/dL    RDW 14 0 11 6 - 15 1 %    MPV 10 5 8 9 - 12 7 fL    Platelets 767 186 - 136 Thousands/uL    nRBC 0 /100 WBCs    Neutrophils Relative 42 (L) 43 - 75 %    Immat GRANS % 0 0 - 2 %    Lymphocytes Relative 43 14 - 44 %    Monocytes Relative 12 4 - 12 %    Eosinophils Relative 2 0 - 6 %    Basophils Relative 1 0 - 1 %    Neutrophils Absolute 1 61 (L) 1 85 - 7 62 Thousands/µL    Immature Grans Absolute 0 00 0 00 - 0 20 Thousand/uL    Lymphocytes Absolute 1 69 0 60 - 4 47 Thousands/µL    Monocytes Absolute 0 48 0 17 - 1 22 Thousand/µL    Eosinophils Absolute 0 07 0 00 - 0 61 Thousand/µL    Basophils Absolute 0 04 0 00 - 0 10 Thousands/µL   Hemoglobin A1C    Collection Time: 11/17/22  2:03 PM   Result Value Ref Range    Hemoglobin A1C 6 2 (H) Normal 3 8-5 6%; PreDiabetic 5 7-6 4%;  Diabetic >=6 5%; Glycemic control for adults with diabetes <7 0% %     mg/dl   Lipid panel    Collection Time: 11/17/22  2:03 PM   Result Value Ref Range    Cholesterol 172 See Comment mg/dL    Triglycerides 63 See Comment mg/dL    HDL, Direct 59 >=50 mg/dL    LDL Calculated 100 0 - 100 mg/dL    Non-HDL-Chol (CHOL-HDL) 113 mg/dl   Magnesium    Collection Time: 11/17/22  2:03 PM   Result Value Ref Range    Magnesium 2 0 1 6 - 2 6 mg/dL       This note was created with voice recognition software  Phonic, grammatical and spelling errors may be present within the note as a result

## 2023-01-06 ENCOUNTER — TELEPHONE (OUTPATIENT)
Dept: INTERNAL MEDICINE CLINIC | Facility: CLINIC | Age: 78
End: 2023-01-06

## 2023-01-06 DIAGNOSIS — I10 HYPERTENSION, BENIGN: Primary | ICD-10-CM

## 2023-01-06 RX ORDER — LISINOPRIL 10 MG/1
10 TABLET ORAL DAILY
Qty: 30 TABLET | Refills: 3 | Status: SHIPPED | OUTPATIENT
Start: 2023-01-06

## 2023-01-06 NOTE — TELEPHONE ENCOUNTER
Pharm says quinipril is on back order and they need an alternative please      Send a substitute med in please to [facility]  As per pharmacy

## 2023-03-01 ENCOUNTER — TELEPHONE (OUTPATIENT)
Dept: GASTROENTEROLOGY | Facility: CLINIC | Age: 78
End: 2023-03-01

## 2023-03-01 ENCOUNTER — PREP FOR PROCEDURE (OUTPATIENT)
Dept: GASTROENTEROLOGY | Facility: CLINIC | Age: 78
End: 2023-03-01

## 2023-03-01 DIAGNOSIS — Z86.010 HISTORY OF COLON POLYPS: Primary | ICD-10-CM

## 2023-03-01 NOTE — TELEPHONE ENCOUNTER
03/01/23  Screened by: Paul Jack    Referring Provider N/A    Pre- Screening: There is no height or weight on file to calculate BMI  Has patient been referred for a routine screening Colonoscopy? yes  Is the patient between 39-70 years old? yes      Previous Colonoscopy yes   If yes:    Date: 4/2017    Facility:     Reason:       SCHEDULING STAFF: If the patient is between 45yrs-49yrs, please advise patient to confirm benefits/coverage with their insurance company for a routine screening colonoscopy, some insurance carriers will only cover at [phone] or older  If the patient is over 66years old, please schedule an office visit  Does the patient want to see a Gastroenterologist prior to their procedure OR are they having any GI symptoms? no    Has the patient been hospitalized or had abdominal surgery in the past 6 months? no    Does the patient use supplemental oxygen? no    Does the patient take Coumadin, Lovenox, Plavix, Elliquis, Xarelto, or other blood thinning medication? no    Has the patient had a stroke, cardiac event, or stent placed in the past year? no    SCHEDULING STAFF: If patient answers NO to above questions, then schedule procedure  If patient answers YES to above questions, then schedule office appointment  Pt Passed OA    If patient is between 45yrs - 49yrs, please advise patient that we will have to confirm benefits & coverage with their insurance company for a routine screening colonoscopy

## 2023-03-01 NOTE — TELEPHONE ENCOUNTER
Scheduled date of colonoscopy (as of today): 5/25/2023  Physician performing colonoscopy: Dr Govind Rosas  Location of colonoscopy: [facility]  Clearances: N/A

## 2023-03-02 ENCOUNTER — TELEPHONE (OUTPATIENT)
Dept: GASTROENTEROLOGY | Facility: CLINIC | Age: 78
End: 2023-03-02

## 2023-03-02 NOTE — TELEPHONE ENCOUNTER
LMOM for patient to phone back an schedule an office appt with either Dr Beth Thomas or a PA for a colonoscopy consultation    The appt is required to due her age     The colonoscopy is scheduled for 05/25

## 2023-03-06 ENCOUNTER — APPOINTMENT (OUTPATIENT)
Dept: LAB | Facility: CLINIC | Age: 78
End: 2023-03-06

## 2023-03-06 DIAGNOSIS — E11.9 TYPE 2 DIABETES MELLITUS WITHOUT COMPLICATION, WITHOUT LONG-TERM CURRENT USE OF INSULIN (HCC): ICD-10-CM

## 2023-03-06 LAB
ALBUMIN SERPL BCP-MCNC: 3.4 G/DL (ref 3.5–5)
ALP SERPL-CCNC: 108 U/L (ref 46–116)
ALT SERPL W P-5'-P-CCNC: 19 U/L (ref 12–78)
ANION GAP SERPL CALCULATED.3IONS-SCNC: 2 MMOL/L (ref 4–13)
AST SERPL W P-5'-P-CCNC: 14 U/L (ref 5–45)
BASOPHILS # BLD AUTO: 0.02 THOUSANDS/ÂΜL (ref 0–0.1)
BASOPHILS NFR BLD AUTO: 1 % (ref 0–1)
BILIRUB SERPL-MCNC: 0.33 MG/DL (ref 0.2–1)
BUN SERPL-MCNC: 16 MG/DL (ref 5–25)
CALCIUM ALBUM COR SERPL-MCNC: 10.6 MG/DL (ref 8.3–10.1)
CALCIUM SERPL-MCNC: 10.1 MG/DL (ref 8.3–10.1)
CHLORIDE SERPL-SCNC: 110 MMOL/L (ref 96–108)
CHOLEST SERPL-MCNC: 192 MG/DL
CO2 SERPL-SCNC: 27 MMOL/L (ref 21–32)
CREAT SERPL-MCNC: 1.12 MG/DL (ref 0.6–1.3)
CREAT UR-MCNC: 161 MG/DL
EOSINOPHIL # BLD AUTO: 0.08 THOUSAND/ÂΜL (ref 0–0.61)
EOSINOPHIL NFR BLD AUTO: 2 % (ref 0–6)
ERYTHROCYTE [DISTWIDTH] IN BLOOD BY AUTOMATED COUNT: 13.2 % (ref 11.6–15.1)
GFR SERPL CREATININE-BSD FRML MDRD: 47 ML/MIN/1.73SQ M
GLUCOSE P FAST SERPL-MCNC: 105 MG/DL (ref 65–99)
HCT VFR BLD AUTO: 43.6 % (ref 34.8–46.1)
HDLC SERPL-MCNC: 55 MG/DL
HGB BLD-MCNC: 13.7 G/DL (ref 11.5–15.4)
IMM GRANULOCYTES # BLD AUTO: 0 THOUSAND/UL (ref 0–0.2)
IMM GRANULOCYTES NFR BLD AUTO: 0 % (ref 0–2)
LDLC SERPL CALC-MCNC: 125 MG/DL (ref 0–100)
LYMPHOCYTES # BLD AUTO: 1.23 THOUSANDS/ÂΜL (ref 0.6–4.47)
LYMPHOCYTES NFR BLD AUTO: 35 % (ref 14–44)
MCH RBC QN AUTO: 29.1 PG (ref 26.8–34.3)
MCHC RBC AUTO-ENTMCNC: 31.4 G/DL (ref 31.4–37.4)
MCV RBC AUTO: 93 FL (ref 82–98)
MICROALBUMIN UR-MCNC: 11.2 MG/L (ref 0–20)
MICROALBUMIN/CREAT 24H UR: 7 MG/G CREATININE (ref 0–30)
MONOCYTES # BLD AUTO: 0.37 THOUSAND/ÂΜL (ref 0.17–1.22)
MONOCYTES NFR BLD AUTO: 11 % (ref 4–12)
NEUTROPHILS # BLD AUTO: 1.78 THOUSANDS/ÂΜL (ref 1.85–7.62)
NEUTS SEG NFR BLD AUTO: 51 % (ref 43–75)
NONHDLC SERPL-MCNC: 137 MG/DL
NRBC BLD AUTO-RTO: 0 /100 WBCS
PLATELET # BLD AUTO: 255 THOUSANDS/UL (ref 149–390)
PMV BLD AUTO: 10.6 FL (ref 8.9–12.7)
POTASSIUM SERPL-SCNC: 4.6 MMOL/L (ref 3.5–5.3)
PROT SERPL-MCNC: 7.4 G/DL (ref 6.4–8.4)
RBC # BLD AUTO: 4.71 MILLION/UL (ref 3.81–5.12)
SODIUM SERPL-SCNC: 139 MMOL/L (ref 135–147)
TRIGL SERPL-MCNC: 58 MG/DL
WBC # BLD AUTO: 3.48 THOUSAND/UL (ref 4.31–10.16)

## 2023-03-07 LAB
EST. AVERAGE GLUCOSE BLD GHB EST-MCNC: 134 MG/DL
HBA1C MFR BLD: 6.3 %

## 2023-03-08 ENCOUNTER — OFFICE VISIT (OUTPATIENT)
Dept: INTERNAL MEDICINE CLINIC | Facility: CLINIC | Age: 78
End: 2023-03-08

## 2023-03-08 VITALS
OXYGEN SATURATION: 95 % | HEIGHT: 65 IN | DIASTOLIC BLOOD PRESSURE: 68 MMHG | SYSTOLIC BLOOD PRESSURE: 116 MMHG | BODY MASS INDEX: 29.56 KG/M2 | HEART RATE: 71 BPM | WEIGHT: 177.4 LBS | RESPIRATION RATE: 16 BRPM

## 2023-03-08 DIAGNOSIS — K64.9 HEMORRHOIDS, UNSPECIFIED HEMORRHOID TYPE: ICD-10-CM

## 2023-03-08 DIAGNOSIS — E78.2 MIXED HYPERLIPIDEMIA: ICD-10-CM

## 2023-03-08 DIAGNOSIS — I10 HYPERTENSION, BENIGN: ICD-10-CM

## 2023-03-08 DIAGNOSIS — E11.9 TYPE 2 DIABETES MELLITUS WITHOUT COMPLICATION, WITHOUT LONG-TERM CURRENT USE OF INSULIN (HCC): Primary | ICD-10-CM

## 2023-03-08 DIAGNOSIS — R35.0 URINARY FREQUENCY: ICD-10-CM

## 2023-03-08 PROBLEM — R41.3 MEMORY LOSS: Status: RESOLVED | Noted: 2018-12-19 | Resolved: 2023-03-08

## 2023-03-08 RX ORDER — PREDNISOLONE ACETATE 10 MG/ML
SUSPENSION/ DROPS OPHTHALMIC
COMMUNITY
Start: 2023-01-26

## 2023-03-08 RX ORDER — HYDROCORTISONE 25 MG/G
CREAM TOPICAL 2 TIMES DAILY
Qty: 28 G | Refills: 3 | Status: SHIPPED | OUTPATIENT
Start: 2023-03-08

## 2023-03-08 RX ORDER — METFORMIN HYDROCHLORIDE 500 MG/1
1000 TABLET, EXTENDED RELEASE ORAL 2 TIMES DAILY WITH MEALS
Qty: 360 TABLET | Refills: 1 | Status: SHIPPED | OUTPATIENT
Start: 2023-03-08 | End: 2023-09-04

## 2023-03-08 NOTE — PROGRESS NOTES
Assessment/Plan:     Chronic problems appear stable  For her continued urinary issues, she would like to see urology  For her hemorrhoid, I gave her Anusol HC cream but she wants to see a surgeon about definitive treatment  However, she has an upcoming colonoscopy so she has to sort that out with GI  Quality Measures:       Return in about 4 months (around 7/8/2023) for Regular visit and Medicare Wellness  No problem-specific Assessment & Plan notes found for this encounter  Diagnoses and all orders for this visit:    Type 2 diabetes mellitus without complication, without long-term current use of insulin (HCC)  -     Comprehensive metabolic panel; Future  -     CBC and differential; Future  -     Hemoglobin A1C; Future  -     Lipid panel; Future  -     metFORMIN (GLUCOPHAGE-XR) 500 mg 24 hr tablet; Take 2 tablets (1,000 mg total) by mouth 2 (two) times a day with meals    Hypertension, benign    Mixed hyperlipidemia    Urinary frequency  -     Ambulatory Referral to Urology; Future    Hemorrhoids, unspecified hemorrhoid type  -     hydrocortisone (ANUSOL-HC) 2 5 % rectal cream; Apply topically 2 (two) times a day    Other orders  -     prednisoLONE acetate (PRED FORTE) 1 % ophthalmic suspension; INSTILL 1 DROP INTO LEFT EYE TWICE A DAY        Subjective:      Patient ID: Tana Osei is a 68 y o  female  Patient comes in today for routine follow-up with her   Her blood work shows continued good control of her sugar and cholesterol  Her blood pressure is controlled  She reports she continues to have trouble with frequent urination  She has tried adjusting her diet  She has tried drinking less at night  She also has trouble with hemorrhoids  This has been going on for a while    She wants to see a surgeon about removal       ALLERGIES:  Allergies   Allergen Reactions   • Nuts - Food Allergy Anaphylaxis     Raw Almonds only   • Codeine Other (See Comments)     Dizzy, lightheaded CURRENT MEDICATIONS:    Current Outpatient Medications:   •  Accu-Chek Guide test strip, CHECK SUGAR DAILY, Disp: 100 strip, Rfl: 2  •  amLODIPine (NORVASC) 5 mg tablet, TAKE 1 TABLET BY MOUTH EVERY DAY, Disp: 90 tablet, Rfl: 3  •  hydrocortisone (ANUSOL-HC) 2 5 % rectal cream, Apply topically 2 (two) times a day, Disp: 28 g, Rfl: 3  •  hyoscyamine (ANASPAZ,LEVSIN) 0 125 MG tablet, TAKE 1 TABLET BY MOUTH 3-4 TIMES DAILY AS NEEDED , Disp: 60 tablet, Rfl: 2  •  levocetirizine (XYZAL) 5 MG tablet, Take 5 mg by mouth as needed , Disp: , Rfl:   •  lisinopril (ZESTRIL) 10 mg tablet, Take 1 tablet (10 mg total) by mouth daily, Disp: 30 tablet, Rfl: 3  •  lovastatin (MEVACOR) 20 mg tablet, Take 1 tablet (20 mg total) by mouth daily at bedtime, Disp: 90 tablet, Rfl: 3  •  metFORMIN (GLUCOPHAGE-XR) 500 mg 24 hr tablet, Take 2 tablets (1,000 mg total) by mouth 2 (two) times a day with meals, Disp: 360 tablet, Rfl: 1  •  naproxen (NAPROSYN) 250 mg tablet, Take 1 tablet (250 mg total) by mouth 2 (two) times a day with meals (Patient taking differently: Take 250 mg by mouth if needed), Disp: 20 tablet, Rfl: 0  •  prednisoLONE acetate (PRED FORTE) 1 % ophthalmic suspension, INSTILL 1 DROP INTO LEFT EYE TWICE A DAY, Disp: , Rfl:     ACTIVE PROBLEM LIST:  Patient Active Problem List   Diagnosis   • Allergic rhinitis   • Mixed hyperlipidemia   • Hypertension, benign   • Irritable bowel syndrome without diarrhea   • Type 2 diabetes mellitus without complication, without long-term current use of insulin (HCC)   • Screening for malignant neoplasm of breast   • Abnormal mammogram   • Breast pain, right   • Paresthesia of both feet   • Bilateral cold feet   • Chronic bilateral low back pain   • Speech defect   • Other headache syndrome       PAST MEDICAL HISTORY:  Past Medical History:   Diagnosis Date   • Allergic    • Arthritis    • Diabetes mellitus (HCC)    • Diverticulitis of colon    • GERD (gastroesophageal reflux disease) • Headache(784 0)    • HNP (herniated nucleus pulposus), lumbar    • Hyperlipidemia    • Hypertension    • IBS (irritable bowel syndrome)    • Osteoarthritis        PAST SURGICAL HISTORY:  Past Surgical History:   Procedure Laterality Date   • PARATHYROID GLAND SURGERY      resolved 05/12   • [place] ESOPHAGOGASTRODUODENOSCOPY TRANSORAL DIAGNOSTIC N/A 4/19/2017    Procedure: EGD AND COLONOSCOPY;  Surgeon: Emmy Pressley MD;  Location: [facility]; Service: Gastroenterology   • UMBILICAL HERNIA REPAIR         FAMILY HISTORY:  Family History   Problem Relation Age of Onset   • Breast cancer Mother    • Heart defect Brother    • Arthritis Family    • Hypertension Family    • No Known Problems Father    • No Known Problems Brother        SOCIAL HISTORY:  Social History     Socioeconomic History   • Marital status: /Civil Union     Spouse name: Not on file   • Number of children: 2   • Years of education: Not on file   • Highest education level: Not on file   Occupational History   • 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   Tobacco Use   • Smoking status: Never   • Smokeless tobacco: Never   Vaping Use   • Vaping Use: Never used   Substance and Sexual Activity   • Alcohol use: No   • Drug use: No   • Sexual activity: Yes     Partners: Male   Other Topics Concern   • Not on file   Social History Narrative   • Not on file     Social Determinants of Health     Financial Resource Strain: Not on file   Food Insecurity: Not on file   Transportation Needs: Not on file   Physical Activity: Not on file   Stress: Not on file   Social Connections: Not on file   Intimate Partner Violence: Not on file   Housing Stability: Not on file       Review of Systems   Respiratory: Negative for shortness of breath  Cardiovascular: Negative for chest pain  Gastrointestinal: Negative for abdominal pain           Objective:  Vitals:    03/08/23 1434   BP: 116/68   BP Location: Left arm   Patient Position: Sitting   Cuff Size: Adult   Pulse: 71   Resp: 16   SpO2: 95%   Weight: 80 5 kg (177 lb 6 4 oz)   Height: 5' 5" (1 651 m)     Body mass index is 29 52 kg/m²  Physical Exam  Vitals and nursing note reviewed  Constitutional:       Appearance: She is well-developed  Cardiovascular:      Rate and Rhythm: Normal rate and regular rhythm  Heart sounds: Normal heart sounds  Pulmonary:      Effort: Pulmonary effort is normal       Breath sounds: Normal breath sounds  Abdominal:      Palpations: Abdomen is soft  Tenderness: There is no abdominal tenderness  Neurological:      Mental Status: She is alert and oriented to person, place, and time  RESULTS:    In chart    This note was created with voice recognition software  Phonic, grammatical and spelling errors may be present within the note as a result

## 2023-03-13 DIAGNOSIS — I10 HYPERTENSION, BENIGN: ICD-10-CM

## 2023-03-13 RX ORDER — AMLODIPINE BESYLATE 5 MG/1
5 TABLET ORAL DAILY
Qty: 90 TABLET | Refills: 3 | Status: SHIPPED | OUTPATIENT
Start: 2023-03-13

## 2023-03-13 RX ORDER — LISINOPRIL 10 MG/1
10 TABLET ORAL DAILY
Qty: 90 TABLET | Refills: 3 | Status: SHIPPED | OUTPATIENT
Start: 2023-03-13

## 2023-03-13 NOTE — TELEPHONE ENCOUNTER
Pharmacy needs this as a 90 day supply please      licinipril 10 mg  1x daily     amolodipine 5 mg   1 x daily     Send in as please

## 2023-04-18 PROBLEM — Z86.010 HISTORY OF COLON POLYPS: Status: ACTIVE | Noted: 2023-04-18

## 2023-04-18 PROBLEM — Z86.0100 HISTORY OF COLON POLYPS: Status: ACTIVE | Noted: 2023-04-18

## 2023-05-16 ENCOUNTER — APPOINTMENT (EMERGENCY)
Dept: RADIOLOGY | Facility: HOSPITAL | Age: 78
End: 2023-05-16

## 2023-05-16 ENCOUNTER — TELEPHONE (OUTPATIENT)
Dept: INTERNAL MEDICINE CLINIC | Facility: CLINIC | Age: 78
End: 2023-05-16

## 2023-05-16 ENCOUNTER — HOSPITAL ENCOUNTER (EMERGENCY)
Facility: HOSPITAL | Age: 78
Discharge: HOME | End: 2023-05-16
Attending: EMERGENCY MEDICINE

## 2023-05-16 ENCOUNTER — APPOINTMENT (EMERGENCY)
Dept: VASCULAR ULTRASOUND | Facility: HOSPITAL | Age: 78
End: 2023-05-16

## 2023-05-16 VITALS
WEIGHT: 175 LBS | SYSTOLIC BLOOD PRESSURE: 149 MMHG | TEMPERATURE: 97.5 F | OXYGEN SATURATION: 95 % | RESPIRATION RATE: 23 BRPM | BODY MASS INDEX: 29.16 KG/M2 | HEIGHT: 65 IN | DIASTOLIC BLOOD PRESSURE: 84 MMHG | HEART RATE: 66 BPM

## 2023-05-16 DIAGNOSIS — R60.0 PEDAL EDEMA: Primary | ICD-10-CM

## 2023-05-16 DIAGNOSIS — Z12.31 ENCOUNTER FOR SCREENING MAMMOGRAM FOR BREAST CANCER: Primary | ICD-10-CM

## 2023-05-16 LAB
ALBUMIN SERPL BCP-MCNC: 3.8 G/DL (ref 3.5–5)
ALP SERPL-CCNC: 105 U/L (ref 34–104)
ALT SERPL W P-5'-P-CCNC: 10 U/L (ref 7–52)
ANION GAP SERPL CALCULATED.3IONS-SCNC: 6 MMOL/L (ref 4–13)
AST SERPL W P-5'-P-CCNC: 14 U/L (ref 13–39)
BASOPHILS # BLD AUTO: 0.04 THOUSANDS/ÂΜL (ref 0–0.1)
BASOPHILS NFR BLD AUTO: 1 % (ref 0–1)
BILIRUB SERPL-MCNC: 0.36 MG/DL (ref 0.2–1)
BNP SERPL-MCNC: 97 PG/ML (ref 0–100)
BUN SERPL-MCNC: 14 MG/DL (ref 5–25)
CALCIUM SERPL-MCNC: 9.9 MG/DL (ref 8.4–10.2)
CARDIAC TROPONIN I PNL SERPL HS: <2 NG/L
CHLORIDE SERPL-SCNC: 107 MMOL/L (ref 96–108)
CO2 SERPL-SCNC: 27 MMOL/L (ref 21–32)
CREAT SERPL-MCNC: 1.08 MG/DL (ref 0.6–1.3)
EOSINOPHIL # BLD AUTO: 0.13 THOUSAND/ÂΜL (ref 0–0.61)
EOSINOPHIL NFR BLD AUTO: 3 % (ref 0–6)
ERYTHROCYTE [DISTWIDTH] IN BLOOD BY AUTOMATED COUNT: 13.4 % (ref 11.6–15.1)
GFR SERPL CREATININE-BSD FRML MDRD: 49 ML/MIN/1.73SQ M
GLUCOSE SERPL-MCNC: 71 MG/DL (ref 65–140)
HCT VFR BLD AUTO: 37.8 % (ref 34.8–46.1)
HGB BLD-MCNC: 12.4 G/DL (ref 11.5–15.4)
IMM GRANULOCYTES # BLD AUTO: 0 THOUSAND/UL (ref 0–0.2)
IMM GRANULOCYTES NFR BLD AUTO: 0 % (ref 0–2)
INR PPP: 0.91 (ref 0.84–1.19)
LYMPHOCYTES # BLD AUTO: 1.95 THOUSANDS/ÂΜL (ref 0.6–4.47)
LYMPHOCYTES NFR BLD AUTO: 43 % (ref 14–44)
MCH RBC QN AUTO: 29.6 PG (ref 26.8–34.3)
MCHC RBC AUTO-ENTMCNC: 32.8 G/DL (ref 31.4–37.4)
MCV RBC AUTO: 90 FL (ref 82–98)
MONOCYTES # BLD AUTO: 0.48 THOUSAND/ÂΜL (ref 0.17–1.22)
MONOCYTES NFR BLD AUTO: 11 % (ref 4–12)
NEUTROPHILS # BLD AUTO: 1.88 THOUSANDS/ÂΜL (ref 1.85–7.62)
NEUTS SEG NFR BLD AUTO: 42 % (ref 43–75)
NRBC BLD AUTO-RTO: 0 /100 WBCS
PLATELET # BLD AUTO: 251 THOUSANDS/UL (ref 149–390)
PMV BLD AUTO: 9.6 FL (ref 8.9–12.7)
POTASSIUM SERPL-SCNC: 3.7 MMOL/L (ref 3.5–5.3)
PROT SERPL-MCNC: 7.1 G/DL (ref 6.4–8.4)
PROTHROMBIN TIME: 12.1 SECONDS (ref 11.6–14.5)
RBC # BLD AUTO: 4.19 MILLION/UL (ref 3.81–5.12)
SODIUM SERPL-SCNC: 140 MMOL/L (ref 135–147)
TSH SERPL DL<=0.05 MIU/L-ACNC: 2.41 UIU/ML (ref 0.45–4.5)
WBC # BLD AUTO: 4.48 THOUSAND/UL (ref 4.31–10.16)

## 2023-05-16 RX ORDER — HYDROCHLOROTHIAZIDE 25 MG/1
12.5 TABLET ORAL DAILY
Qty: 20 TABLET | Refills: 0 | Status: SHIPPED | OUTPATIENT
Start: 2023-05-16

## 2023-05-16 RX ORDER — POTASSIUM CHLORIDE 20 MEQ/1
20 TABLET, EXTENDED RELEASE ORAL 2 TIMES DAILY
Qty: 20 TABLET | Refills: 0 | Status: SHIPPED | OUTPATIENT
Start: 2023-05-16

## 2023-05-16 RX ORDER — POTASSIUM CHLORIDE 20 MEQ/1
40 TABLET, EXTENDED RELEASE ORAL ONCE
Status: COMPLETED | OUTPATIENT
Start: 2023-05-16 | End: 2023-05-16

## 2023-05-16 RX ADMIN — POTASSIUM CHLORIDE 40 MEQ: 1500 TABLET, EXTENDED RELEASE ORAL at 20:33

## 2023-05-16 NOTE — TELEPHONE ENCOUNTER
Lashae Garvin is coming in tomorrow for her mammogram screening  Kendra Carroll is requesting script for mammogram screening       Please fax to [phone]    Call back [address]

## 2023-05-16 NOTE — ED PROVIDER NOTES
History  Chief Complaint   Patient presents with   • Foot Swelling     Pt reporting L foot swelling for four days  Pt reporting associated pain behind the L knee  Pt reporting numbness in the toes for four days  68year old female pt comes to the ED with cc of left foot swelling with referred left hip pain  She states she started with hip pain several days ago and then her left foot swelled significantly  She has 4+ left and 3+ right pitting edema  She reports being in diuretics previously but was taken off of them for peeing too much  She has no reported chest pains, shortness of breath, PND, SOOD     D/D includes CHF, ACS, pedal edema caused by Norvasc  History provided by:  Patient   used: No    Medical Problem  Severity:  Mild  Onset quality:  Gradual  Timing:  Constant  Chronicity:  New  Associated symptoms: no chest pain and no loss of consciousness        Prior to Admission Medications   Prescriptions Last Dose Informant Patient Reported? Taking?    Accu-Chek Guide test strip   No No   Sig: CHECK SUGAR DAILY   amLODIPine (NORVASC) 5 mg tablet   No No   Sig: Take 1 tablet (5 mg total) by mouth daily   dicyclomine (BENTYL) 10 mg capsule   No No   Sig: Take 1 capsule (10 mg total) by mouth 3 (three) times a day as needed (abdominal pain)   hydrocortisone (ANUSOL-HC) 2 5 % rectal cream   No No   Sig: Apply topically 2 (two) times a day   levocetirizine (XYZAL) 5 MG tablet   Yes No   Sig: Take 5 mg by mouth as needed    lisinopril (ZESTRIL) 10 mg tablet   No No   Sig: Take 1 tablet (10 mg total) by mouth daily   lovastatin (MEVACOR) 20 mg tablet   No No   Sig: Take 1 tablet (20 mg total) by mouth daily at bedtime   metFORMIN (GLUCOPHAGE-XR) 500 mg 24 hr tablet   No No   Sig: Take 2 tablets (1,000 mg total) by mouth 2 (two) times a day with meals   prednisoLONE acetate (PRED FORTE) 1 % ophthalmic suspension   Yes No   Sig: INSTILL 1 DROP INTO LEFT EYE TWICE A DAY Facility-Administered Medications: None       Past Medical History:   Diagnosis Date   • Allergic    • Arthritis    • Diabetes mellitus ([address] )    • Diverticulitis of colon    • GERD (gastroesophageal reflux disease)    • Headache(784 0)    • HNP (herniated nucleus pulposus), lumbar    • Hyperlipidemia    • Hypertension    • IBS (irritable bowel syndrome)    • Osteoarthritis        Past Surgical History:   Procedure Laterality Date   • PARATHYROID GLAND SURGERY      resolved 05/12   • [place] ESOPHAGOGASTRODUODENOSCOPY TRANSORAL DIAGNOSTIC N/A 4/19/2017    Procedure: EGD AND COLONOSCOPY;  Surgeon: Nery Kerns MD;  Location: [facility]; Service: Gastroenterology   • UMBILICAL HERNIA REPAIR         Family History   Problem Relation Age of Onset   • Breast cancer Mother    • Heart defect Brother    • Arthritis Family    • Hypertension Family    • No Known Problems Father    • No Known Problems Brother      I have reviewed and agree with the history as documented  E-Cigarette/Vaping   • E-Cigarette Use Never User      E-Cigarette/Vaping Substances   • Nicotine No    • THC No    • CBD No    • Flavoring No    • Other No    • Unknown No      Social History     Tobacco Use   • Smoking status: Never   • Smokeless tobacco: Never   Vaping Use   • Vaping Use: Never used   Substance Use Topics   • Alcohol use: No   • Drug use: No       Review of Systems   Cardiovascular: Negative for chest pain  Neurological: Negative for loss of consciousness  All other systems reviewed and are negative  Physical Exam  Physical Exam  Vitals and nursing note reviewed  Constitutional:       Appearance: She is well-developed  HENT:      Head: Normocephalic and atraumatic  Right Ear: External ear normal       Left Ear: External ear normal    Eyes:      Conjunctiva/sclera: Conjunctivae normal    Neck:      Thyroid: No thyromegaly  Vascular: No JVD  Trachea: No tracheal deviation     Cardiovascular:      Rate and Rhythm: Normal rate  Pulmonary:      Effort: Pulmonary effort is normal       Breath sounds: Normal breath sounds  No stridor  Abdominal:      General: There is no distension  Palpations: Abdomen is soft  There is no mass  Tenderness: There is no abdominal tenderness  There is no guarding  Hernia: No hernia is present  Musculoskeletal:         General: No tenderness or deformity  Normal range of motion  Right lower leg: Edema present  Left lower leg: Edema present  Lymphadenopathy:      Cervical: No cervical adenopathy  Skin:     General: Skin is warm  Coloration: Skin is not pale  Findings: No erythema or rash  Neurological:      Mental Status: She is alert and oriented to person, place, and time     Psychiatric:         Behavior: Behavior normal          Vital Signs  ED Triage Vitals   Temperature Pulse Respirations Blood Pressure SpO2   05/16/23 1619 05/16/23 1619 05/16/23 1619 05/16/23 1619 05/16/23 1619   97 5 °F (36 4 °C) 77 18 138/92 98 %      Temp Source Heart Rate Source Patient Position - Orthostatic VS BP Location FiO2 (%)   05/16/23 1619 05/16/23 1619 05/16/23 1619 05/16/23 1619 --   Temporal Monitor Sitting Left arm       Pain Score       05/16/23 1621       6           Vitals:    05/16/23 1619 05/16/23 1730 05/16/23 2000   BP: 138/92 145/79 149/84   Pulse: 77 77 66   Patient Position - Orthostatic VS: Sitting Sitting Sitting         Visual Acuity      ED Medications  Medications   potassium chloride (K-DUR,KLOR-CON) CR tablet 40 mEq (40 mEq Oral Given 5/16/23 2033)       Diagnostic Studies  Results Reviewed     Procedure Component Value Units Date/Time    HS Troponin 0hr (reflex protocol) [[id number]]  (Normal) Collected: 05/16/23 1857    Lab Status: Final result Specimen: Blood from Arm, Right Updated: 05/16/23 1937     hs TnI 0hr <2 ng/L     TSH, 3rd generation with Free T4 reflex [[id number]]  (Normal) Collected: 05/16/23 1817    Lab Status: Final result Specimen: Blood from Arm, Right Updated: 05/16/23 1901     TSH 3RD GENERATON 2 408 uIU/mL     B-Type Natriuretic Peptide(BNP) [[id number]]  (Normal) Collected: 05/16/23 1817    Lab Status: Final result Specimen: Blood from Arm, Right Updated: 05/16/23 1851     BNP 97 pg/mL     Comprehensive metabolic panel [[id number]]  (Abnormal) Collected: 05/16/23 1817    Lab Status: Final result Specimen: Blood from Arm, Right Updated: 05/16/23 1843     Sodium 140 mmol/L      Potassium 3 7 mmol/L      Chloride 107 mmol/L      CO2 27 mmol/L      ANION GAP 6 mmol/L      BUN 14 mg/dL      Creatinine 1 08 mg/dL      Glucose 71 mg/dL      Calcium 9 9 mg/dL      AST 14 U/L      ALT 10 U/L      Alkaline Phosphatase 105 U/L      Total Protein 7 1 g/dL      Albumin 3 8 g/dL      Total Bilirubin 0 36 mg/dL      eGFR 49 ml/min/1 73sq m     Narrative:      [place] guidelines for Chronic Kidney Disease (CKD):   •  Stage 1 with normal or high GFR (GFR > 90 mL/min/1 73 square meters)  •  Stage 2 Mild CKD (GFR = 60-89 mL/min/1 73 square meters)  •  Stage 3A Moderate CKD (GFR = 45-59 mL/min/1 73 square meters)  •  Stage 3B Moderate CKD (GFR = 30-44 mL/min/1 73 square meters)  •  Stage 4 Severe CKD (GFR = 15-29 mL/min/1 73 square meters)  •  Stage 5 End Stage CKD (GFR <15 mL/min/1 73 square meters)  Note: GFR calculation is accurate only with a steady state creatinine    Protime-INR [[id number]]  (Normal) Collected: 05/16/23 1817    Lab Status: Final result Specimen: Blood from Arm, Right Updated: 05/16/23 1835     Protime 12 1 seconds      INR 0 91    CBC and differential [[id number]]  (Abnormal) Collected: 05/16/23 1817    Lab Status: Final result Specimen: Blood from Arm, Right Updated: 05/16/23 1823     WBC 4 48 Thousand/uL      RBC 4 19 Million/uL      Hemoglobin 12 4 g/dL      Hematocrit 37 8 %      MCV 90 fL      MCH 29 6 pg      MCHC 32 8 g/dL      RDW 13 4 %      MPV 9 6 fL      Platelets 979 Thousands/uL      nRBC 0 /100 WBCs      Neutrophils Relative 42 %      Immat GRANS % 0 %      Lymphocytes Relative 43 %      Monocytes Relative 11 %      Eosinophils Relative 3 %      Basophils Relative 1 %      Neutrophils Absolute 1 88 Thousands/µL      Immature Grans Absolute 0 00 Thousand/uL      Lymphocytes Absolute 1 95 Thousands/µL      Monocytes Absolute 0 48 Thousand/µL      Eosinophils Absolute 0 13 Thousand/µL      Basophils Absolute 0 04 Thousands/µL                  XR chest 2 views   Final Result by Sade Starks MD (05/17 6065)      No acute cardiopulmonary disease  Workstation performed: [id number]         VAS lower limb venous duplex study, unilateral/limited   Final Result by Merlin Valverde MD (05/17 1320)                 Procedures  Procedures         ED Course                                             Medical Decision Making  Pedal edema: acute illness or injury  Amount and/or Complexity of Data Reviewed  Labs: ordered  Radiology: ordered  Risk  Prescription drug management  Disposition  Final diagnoses:   Pedal edema     Time reflects when diagnosis was documented in both MDM as applicable and the Disposition within this note     Time User Action Codes Description Comment    5/16/2023  8:20 PM Colonel Quinterosks Add [R60 0] Pedal edema       ED Disposition     ED Disposition   Discharge    Condition   Stable    Date/Time   Tue May 16, 2023  8:20 PM    Comment   Arnie Heller discharge to home/self care                 Follow-up Information     Follow up With Specialties Details Why Contact Info    Otis Hansen MD Internal Medicine   [facility]  [phone]            Discharge Medication List as of 5/16/2023  8:29 PM      START taking these medications    Details   hydrochlorothiazide (HYDRODIURIL) 25 mg tablet Take 0 5 tablets (12 5 mg total) by mouth daily, Starting Tue 5/16/2023, Normal      potassium chloride (K-DUR,KLOR-CON) 20 mEq tablet Take 1 tablet (20 mEq total) by mouth 2 (two) times a day, Starting Tue 5/16/2023, Normal         CONTINUE these medications which have NOT CHANGED    Details   Accu-Chek Guide test strip CHECK SUGAR DAILY, Normal      amLODIPine (NORVASC) 5 mg tablet Take 1 tablet (5 mg total) by mouth daily, Starting Mon 3/13/2023, Normal      dicyclomine (BENTYL) 10 mg capsule Take 1 capsule (10 mg total) by mouth 3 (three) times a day as needed (abdominal pain), Starting Tue 4/18/2023, Normal      hydrocortisone (ANUSOL-HC) 2 5 % rectal cream Apply topically 2 (two) times a day, Starting Tue 4/18/2023, Normal      levocetirizine (XYZAL) 5 MG tablet Take 5 mg by mouth as needed , Historical Med      lisinopril (ZESTRIL) 10 mg tablet Take 1 tablet (10 mg total) by mouth daily, Starting Mon 3/13/2023, Normal      lovastatin (MEVACOR) 20 mg tablet Take 1 tablet (20 mg total) by mouth daily at bedtime, Starting Tue 7/6/2021, Normal      metFORMIN (GLUCOPHAGE-XR) 500 mg 24 hr tablet Take 2 tablets (1,000 mg total) by mouth 2 (two) times a day with meals, Starting Wed 3/8/2023, Until Mon 9/4/2023, Normal      prednisoLONE acetate (PRED FORTE) 1 % ophthalmic suspension INSTILL 1 DROP INTO LEFT EYE TWICE A DAY, Historical Med             No discharge procedures on file      PDMP Review     None          ED Provider  Electronically Signed by           Jeanette Glass DO  05/17/23 5936

## 2023-05-17 LAB
ATRIAL RATE: 62 BPM
P AXIS: 64 DEGREES
PR INTERVAL: 140 MS
QRS AXIS: 50 DEGREES
QRSD INTERVAL: 88 MS
QT INTERVAL: 410 MS
QTC INTERVAL: 416 MS
T WAVE AXIS: 4 DEGREES
VENTRICULAR RATE: 62 BPM

## 2023-05-18 ENCOUNTER — APPOINTMENT (EMERGENCY)
Dept: RADIOLOGY | Facility: HOSPITAL | Age: 78
End: 2023-05-18

## 2023-05-18 ENCOUNTER — APPOINTMENT (EMERGENCY)
Dept: VASCULAR ULTRASOUND | Facility: HOSPITAL | Age: 78
End: 2023-05-18

## 2023-05-18 ENCOUNTER — HOSPITAL ENCOUNTER (EMERGENCY)
Facility: HOSPITAL | Age: 78
Discharge: HOME | End: 2023-05-18
Attending: EMERGENCY MEDICINE

## 2023-05-18 VITALS
HEART RATE: 71 BPM | TEMPERATURE: 98 F | RESPIRATION RATE: 16 BRPM | SYSTOLIC BLOOD PRESSURE: 152 MMHG | OXYGEN SATURATION: 98 % | DIASTOLIC BLOOD PRESSURE: 70 MMHG

## 2023-05-18 DIAGNOSIS — M25.561 ACUTE PAIN OF RIGHT KNEE: Primary | ICD-10-CM

## 2023-05-18 DIAGNOSIS — M71.21 BAKER CYST, RIGHT: ICD-10-CM

## 2023-05-18 RX ORDER — DEXAMETHASONE SODIUM PHOSPHATE 10 MG/ML
8 INJECTION, SOLUTION INTRAMUSCULAR; INTRAVENOUS ONCE
Status: COMPLETED | OUTPATIENT
Start: 2023-05-18 | End: 2023-05-18

## 2023-05-18 RX ORDER — HYDROCODONE BITARTRATE AND ACETAMINOPHEN 5; 325 MG/1; MG/1
1 TABLET ORAL EVERY 6 HOURS PRN
Qty: 20 TABLET | Refills: 0 | Status: SHIPPED | OUTPATIENT
Start: 2023-05-18 | End: 2023-05-28

## 2023-05-18 RX ORDER — HYDROCODONE BITARTRATE AND ACETAMINOPHEN 5; 325 MG/1; MG/1
1 TABLET ORAL ONCE
Status: COMPLETED | OUTPATIENT
Start: 2023-05-18 | End: 2023-05-18

## 2023-05-18 RX ORDER — PREDNISONE 50 MG/1
50 TABLET ORAL DAILY
Qty: 6 TABLET | Refills: 0 | Status: SHIPPED | OUTPATIENT
Start: 2023-05-18

## 2023-05-18 RX ADMIN — HYDROCODONE BITARTRATE AND ACETAMINOPHEN 1 TABLET: 5; 325 TABLET ORAL at 11:08

## 2023-05-18 RX ADMIN — DEXAMETHASONE SODIUM PHOSPHATE 8 MG: 10 INJECTION, SOLUTION INTRAMUSCULAR; INTRAVENOUS at 11:09

## 2023-05-18 NOTE — DISCHARGE INSTRUCTIONS
A  personal message from Dr Jeronimo Pablo,  Thank you so much for allowing me to care for you today  I pride myself in the care and attention I give all my patients  I hope you were a witness to this tonight  If for any reason your condition does not improve or worsens, or you have a question that was not answered during your visit you can feel free to text me on my personal phone #  # [phone]  I will answer to your message and continue your care past your emergency room visit  Please understand that although you are being discharged because your condition has been deemed stable and able to be managed on an outpatient setting  However your condition may worsen as part of the natural progression of the illness/condition, if this occurs please come back to the emergency department for a repeat evaluation

## 2023-05-18 NOTE — ED PROVIDER NOTES
History  Chief Complaint   Patient presents with   • Knee Pain     Pt with right sided knee pain since this morning at around 3am   Describes it as a sharp pain behind her knee and states its warm  No falls, injury or trauma noted  49-year-old female who presents emergency department with right knee pain and swelling  Started this morning acutely around 3 AM no falls no injuries  Has difficulty with ambulation secondary to it  No history of gout  Past medical history of diabetes, hypertension, hypercholesterolemia, patient does not smoke or drink alcohol  Sx: parathyroid       History provided by:  Patient   used: No        Prior to Admission Medications   Prescriptions Last Dose Informant Patient Reported? Taking?    Accu-Chek Guide test strip   No No   Sig: CHECK SUGAR DAILY   amLODIPine (NORVASC) 5 mg tablet   No No   Sig: Take 1 tablet (5 mg total) by mouth daily   dicyclomine (BENTYL) 10 mg capsule   No No   Sig: Take 1 capsule (10 mg total) by mouth 3 (three) times a day as needed (abdominal pain)   hydrochlorothiazide (HYDRODIURIL) 25 mg tablet   No No   Sig: Take 0 5 tablets (12 5 mg total) by mouth daily   hydrocortisone (ANUSOL-HC) 2 5 % rectal cream   No No   Sig: Apply topically 2 (two) times a day   levocetirizine (XYZAL) 5 MG tablet   Yes No   Sig: Take 5 mg by mouth as needed    lisinopril (ZESTRIL) 10 mg tablet   No No   Sig: Take 1 tablet (10 mg total) by mouth daily   lovastatin (MEVACOR) 20 mg tablet   No No   Sig: Take 1 tablet (20 mg total) by mouth daily at bedtime   metFORMIN (GLUCOPHAGE-XR) 500 mg 24 hr tablet   No No   Sig: Take 2 tablets (1,000 mg total) by mouth 2 (two) times a day with meals   potassium chloride (K-DUR,KLOR-CON) 20 mEq tablet   No No   Sig: Take 1 tablet (20 mEq total) by mouth 2 (two) times a day   prednisoLONE acetate (PRED FORTE) 1 % ophthalmic suspension   Yes No   Sig: INSTILL 1 DROP INTO LEFT EYE TWICE A DAY      Facility-Administered Medications: None       Past Medical History:   Diagnosis Date   • Allergic    • Arthritis    • Diabetes mellitus ([address] )    • Diverticulitis of colon    • GERD (gastroesophageal reflux disease)    • Headache(784 0)    • HNP (herniated nucleus pulposus), lumbar    • Hyperlipidemia    • Hypertension    • IBS (irritable bowel syndrome)    • Osteoarthritis        Past Surgical History:   Procedure Laterality Date   • PARATHYROID GLAND SURGERY      resolved 05/12   • [place] ESOPHAGOGASTRODUODENOSCOPY TRANSORAL DIAGNOSTIC N/A 4/19/2017    Procedure: EGD AND COLONOSCOPY;  Surgeon: Yamila Kim MD;  Location: [facility]; Service: Gastroenterology   • UMBILICAL HERNIA REPAIR         Family History   Problem Relation Age of Onset   • Breast cancer Mother    • Heart defect Brother    • Arthritis Family    • Hypertension Family    • No Known Problems Father    • No Known Problems Brother      I have reviewed and agree with the history as documented  E-Cigarette/Vaping   • E-Cigarette Use Never User      E-Cigarette/Vaping Substances   • Nicotine No    • THC No    • CBD No    • Flavoring No    • Other No    • Unknown No      Social History     Tobacco Use   • Smoking status: Never   • Smokeless tobacco: Never   Vaping Use   • Vaping Use: Never used   Substance Use Topics   • Alcohol use: No   • Drug use: No       Review of Systems   Constitutional: Negative for chills and fever  HENT: Negative for ear pain and sore throat  Eyes: Negative for pain and visual disturbance  Respiratory: Negative for cough and shortness of breath  Cardiovascular: Negative for chest pain and palpitations  Gastrointestinal: Negative for abdominal pain and vomiting  Genitourinary: Negative for dysuria and hematuria  Musculoskeletal: Positive for arthralgias and gait problem  Negative for back pain  Skin: Negative for color change and rash  Neurological: Negative for seizures and syncope     All other systems reviewed and are negative  Physical Exam  Physical Exam  Vitals and nursing note reviewed  Constitutional:       General: She is not in acute distress  Appearance: Normal appearance  She is well-developed and normal weight  HENT:      Head: Normocephalic and atraumatic  Nose: Nose normal    Eyes:      Extraocular Movements: Extraocular movements intact  Conjunctiva/sclera: Conjunctivae normal       Pupils: Pupils are equal, round, and reactive to light  Cardiovascular:      Rate and Rhythm: Normal rate  Heart sounds: No murmur heard  Pulmonary:      Effort: Pulmonary effort is normal  No respiratory distress  Abdominal:      Palpations: Abdomen is soft  Tenderness: There is no abdominal tenderness  Musculoskeletal:         General: No swelling  Cervical back: Neck supple  Right knee: Swelling and effusion (small) present  No deformity, erythema, ecchymosis, lacerations, bony tenderness or crepitus  Normal range of motion  Tenderness present  Skin:     General: Skin is warm and dry  Capillary Refill: Capillary refill takes less than 2 seconds  Neurological:      General: No focal deficit present  Mental Status: She is alert and oriented to person, place, and time  Psychiatric:         Mood and Affect: Mood normal          Thought Content:  Thought content normal          Judgment: Judgment normal          Vital Signs  ED Triage Vitals [05/18/23 1021]   Temperature Pulse Respirations Blood Pressure SpO2   98 °F (36 7 °C) 71 16 152/70 98 %      Temp src Heart Rate Source Patient Position - Orthostatic VS BP Location FiO2 (%)   -- Monitor Sitting Left arm --      Pain Score       10 - Worst Possible Pain           Vitals:    05/18/23 1021   BP: 152/70   Pulse: 71   Patient Position - Orthostatic VS: Sitting         Visual Acuity      ED Medications  Medications   dexamethasone (PF) (DECADRON) injection 8 mg (8 mg Intramuscular Given 5/18/23 1109) HYDROcodone-acetaminophen (NORCO) 5-325 mg per tablet 1 tablet (1 tablet Oral Given 5/18/23 1108)       Diagnostic Studies  Results Reviewed     None                 VAS lower limb venous duplex study, unilateral/limited   Final Result by Flaquita Escamilla MD (05/18 3670)      XR knee 3 views right non injury   Final Result by Yee Castaneda MD (05/18 8570)      No acute osseous abnormality  Chronic osteoarthritis  Knee joint effusion  Workstation performed: [id number]                    Procedures  Procedures         ED Course               Identification of Seniors at Risk    Flowsheet Row Most Recent Value   (ISAR) Identification of Seniors at Risk    Before the illness or injury that brought you to the Emergency, did you need someone to help you on a regular basis? 0 Filed at: 05/18/2023 1022   In the last 24 hours, have you needed more help than usual? 0 Filed at: 05/18/2023 1022   Have you been hospitalized for one or more nights during the past 6 months? 0 Filed at: 05/18/2023 1022   In general, do you see well? 1 Filed at: 05/18/2023 1022   In general, do you have serious problems with your memory? 0 Filed at: 05/18/2023 1022   Do you take more than three different medications every day? 0 Filed at: 05/18/2023 1022   ISAR Score 1 Filed at: 05/18/2023 1022                      SBIRT 20yo+    Flowsheet Row Most Recent Value   Initial Alcohol Screen: US AUDIT-C     1  How often do you have a drink containing alcohol? 0 Filed at: 05/18/2023 1022   2  How many drinks containing alcohol do you have on a typical day you are drinking? 0 Filed at: 05/18/2023 1022   3a  Male UNDER 65: How often do you have five or more drinks on one occasion? 0 Filed at: 05/18/2023 1022   3b  FEMALE Any Age, or MALE 65+: How often do you have 4 or more drinks on one occassion? 0 Filed at: 05/18/2023 1022   Audit-C Score 0 Filed at: 05/18/2023 1022   ALEKSEY: How many times in the past year have you        Used an illegal drug or used a prescription medication for non-medical reasons? Never Filed at: 05/18/2023 1022                    Medical Decision Making  Radiology studies have been independently visualized by me  Preliminary interpretation: arthritis of knee joint, small effusion   All radiology studies will be officially read by the radiologist and any discrepancies flagged and follow through the discrepancy management process to make the patient aware of significant results  Acute pain of right knee: acute illness or injury  Baker cyst, right: acute illness or injury  Amount and/or Complexity of Data Reviewed  Radiology: ordered  Discussion of management or test interpretation with external provider(s): US neg for DVT     Risk  OTC drugs  Prescription drug management  Disposition  Final diagnoses:   Acute pain of right knee   Baker cyst, right     Time reflects when diagnosis was documented in both MDM as applicable and the Disposition within this note     Time User Action Codes Description Comment    5/18/2023 12:06 PM Nitin Lewis [M25 561] Acute pain of right knee     5/18/2023 12:06 PM Nitin Lewis [M71 21] Baker cyst, right       ED Disposition     ED Disposition   Discharge    Condition   Stable    Date/Time   Thu May 18, 2023 12:06 PM    Comment   Fiona Block discharge to home/self care                 Follow-up Information     Follow up With Specialties Details Why Contact Info Additional [address] [facility] In 1 week  [address] [address] [address], [address] [address] [place], [place], [facility]          Discharge Medication List as of 5/18/2023 12:08 PM      START taking these medications    Details   HYDROcodone-acetaminophen (Norco) 5-325 mg per tablet Take 1 tablet by mouth every 6 (six) hours as needed for pain for up to 10 days Max Daily Amount: 4 tablets, Starting Thu 5/18/2023, Until Sun 5/28/2023 at 2359, Normal      predniSONE 50 mg tablet Take 1 tablet (50 mg total) by mouth daily Start 5/19, Starting Thu 5/18/2023, Normal         CONTINUE these medications which have NOT CHANGED    Details   Accu-Chek Guide test strip CHECK SUGAR DAILY, Normal      amLODIPine (NORVASC) 5 mg tablet Take 1 tablet (5 mg total) by mouth daily, Starting Mon 3/13/2023, Normal      dicyclomine (BENTYL) 10 mg capsule Take 1 capsule (10 mg total) by mouth 3 (three) times a day as needed (abdominal pain), Starting Tue 4/18/2023, Normal      hydrochlorothiazide (HYDRODIURIL) 25 mg tablet Take 0 5 tablets (12 5 mg total) by mouth daily, Starting Tue 5/16/2023, Normal      hydrocortisone (ANUSOL-HC) 2 5 % rectal cream Apply topically 2 (two) times a day, Starting Tue 4/18/2023, Normal      levocetirizine (XYZAL) 5 MG tablet Take 5 mg by mouth as needed , Historical Med      lisinopril (ZESTRIL) 10 mg tablet Take 1 tablet (10 mg total) by mouth daily, Starting Mon 3/13/2023, Normal      lovastatin (MEVACOR) 20 mg tablet Take 1 tablet (20 mg total) by mouth daily at bedtime, Starting Tue 7/6/2021, Normal      metFORMIN (GLUCOPHAGE-XR) 500 mg 24 hr tablet Take 2 tablets (1,000 mg total) by mouth 2 (two) times a day with meals, Starting Wed 3/8/2023, Until Mon 9/4/2023, Normal      potassium chloride (K-DUR,KLOR-CON) 20 mEq tablet Take 1 tablet (20 mEq total) by mouth 2 (two) times a day, Starting Tue 5/16/2023, Normal      prednisoLONE acetate (PRED FORTE) 1 % ophthalmic suspension INSTILL 1 DROP INTO LEFT EYE TWICE A DAY, Historical Med             No discharge procedures on file      PDMP Review     None          ED Provider  Electronically Signed by           Iris Maldonado MD  05/18/23 0862

## 2023-05-22 ENCOUNTER — TELEPHONE (OUTPATIENT)
Dept: GASTROENTEROLOGY | Facility: CLINIC | Age: 78
End: 2023-05-22

## 2023-05-24 DIAGNOSIS — Z12.31 ENCOUNTER FOR SCREENING MAMMOGRAM FOR BREAST CANCER: ICD-10-CM

## 2023-05-25 ENCOUNTER — HOSPITAL ENCOUNTER (OUTPATIENT)
Dept: GASTROENTEROLOGY | Facility: HOSPITAL | Age: 78
Setting detail: OUTPATIENT SURGERY
End: 2023-05-25
Attending: INTERNAL MEDICINE

## 2023-05-25 ENCOUNTER — ANESTHESIA EVENT (OUTPATIENT)
Dept: GASTROENTEROLOGY | Facility: HOSPITAL | Age: 78
End: 2023-05-25

## 2023-05-25 ENCOUNTER — ANESTHESIA (OUTPATIENT)
Dept: GASTROENTEROLOGY | Facility: HOSPITAL | Age: 78
End: 2023-05-25

## 2023-05-25 VITALS
BODY MASS INDEX: 29.86 KG/M2 | RESPIRATION RATE: 16 BRPM | HEIGHT: 65 IN | WEIGHT: 179.23 LBS | OXYGEN SATURATION: 99 % | TEMPERATURE: 97.5 F | SYSTOLIC BLOOD PRESSURE: 144 MMHG | HEART RATE: 70 BPM | DIASTOLIC BLOOD PRESSURE: 71 MMHG

## 2023-05-25 DIAGNOSIS — Z86.010 HISTORY OF COLON POLYPS: ICD-10-CM

## 2023-05-25 LAB — GLUCOSE SERPL-MCNC: 131 MG/DL (ref 65–140)

## 2023-05-25 RX ORDER — PROPOFOL 10 MG/ML
INJECTION, EMULSION INTRAVENOUS AS NEEDED
Status: DISCONTINUED | OUTPATIENT
Start: 2023-05-25 | End: 2023-05-25

## 2023-05-25 RX ORDER — SODIUM CHLORIDE, SODIUM LACTATE, POTASSIUM CHLORIDE, CALCIUM CHLORIDE 600; 310; 30; 20 MG/100ML; MG/100ML; MG/100ML; MG/100ML
INJECTION, SOLUTION INTRAVENOUS CONTINUOUS PRN
Status: DISCONTINUED | OUTPATIENT
Start: 2023-05-25 | End: 2023-05-25

## 2023-05-25 RX ORDER — LIDOCAINE HYDROCHLORIDE 10 MG/ML
INJECTION, SOLUTION EPIDURAL; INFILTRATION; INTRACAUDAL; PERINEURAL AS NEEDED
Status: DISCONTINUED | OUTPATIENT
Start: 2023-05-25 | End: 2023-05-25

## 2023-05-25 RX ADMIN — PROPOFOL 20 MG: 10 INJECTION, EMULSION INTRAVENOUS at 08:25

## 2023-05-25 RX ADMIN — PROPOFOL 20 MG: 10 INJECTION, EMULSION INTRAVENOUS at 08:22

## 2023-05-25 RX ADMIN — PROPOFOL 20 MG: 10 INJECTION, EMULSION INTRAVENOUS at 08:20

## 2023-05-25 RX ADMIN — PROPOFOL 80 MG: 10 INJECTION, EMULSION INTRAVENOUS at 08:18

## 2023-05-25 RX ADMIN — PROPOFOL 20 MG: 10 INJECTION, EMULSION INTRAVENOUS at 08:28

## 2023-05-25 RX ADMIN — SODIUM CHLORIDE, SODIUM LACTATE, POTASSIUM CHLORIDE, AND CALCIUM CHLORIDE: .6; .31; .03; .02 INJECTION, SOLUTION INTRAVENOUS at 08:05

## 2023-05-25 RX ADMIN — LIDOCAINE HYDROCHLORIDE 20 MG: 10 INJECTION, SOLUTION EPIDURAL; INFILTRATION; INTRACAUDAL; PERINEURAL at 08:18

## 2023-05-25 NOTE — ANESTHESIA PREPROCEDURE EVALUATION
Procedure:  COLONOSCOPY    Relevant Problems   CARDIO   (+) Breast pain, right   (+) Hypertension, benign   (+) Mixed hyperlipidemia      ENDO   (+) Type 2 diabetes mellitus without complication, without long-term current use of insulin (HCC)      MUSCULOSKELETAL   (+) Chronic bilateral low back pain      NEURO/PSYCH   (+) Chronic bilateral low back pain   (+) Other headache syndrome   (+) Paresthesia of both feet      Hyperlipidemia    Hypertension    IBS (irritable bowel syndrome)    Osteoarthritis    HNP (herniated nucleus pulposus), lumbar    Diabetes mellitus (HCC)    Arthritis    Allergic    Diverticulitis of colon    GERD (gastroesophageal reflux disease)    Headache(784 0)        Physical Exam    Airway    Mallampati score: II  TM Distance: >3 FB  Neck ROM: full     Dental       Cardiovascular  Cardiovascular exam normal    Pulmonary  Pulmonary exam normal     Other Findings        Anesthesia Plan  ASA Score- 2     Anesthesia Type- IV sedation with anesthesia with ASA Monitors  Additional Monitors:   Airway Plan:           Plan Factors-Exercise tolerance (METS): >4 METS  Chart reviewed  EKG reviewed  Imaging results reviewed  Existing labs reviewed  Patient summary reviewed  Induction- intravenous  Postoperative Plan-     Informed Consent- Anesthetic plan and risks discussed with patient  I personally reviewed this patient with the CRNA  Discussed and agreed on the Anesthesia Plan with the CRNA  Viktoriya Rodriguez

## 2023-05-25 NOTE — H&P
"History and Physical -  [facility]  Severo Chapin 68 y o  female MRN: [id number]                  HPI: Severo Chapin is a 68y o  year old female who presents for colonoscopy for history of colon polyp      REVIEW OF SYSTEMS: Per the HPI, and otherwise unremarkable  Historical Information   Past Medical History:   Diagnosis Date   • Allergic    • Arthritis    • Diabetes mellitus ([address] )    • Diverticulitis of colon    • GERD (gastroesophageal reflux disease)    • Headache(784 0)    • HNP (herniated nucleus pulposus), lumbar    • Hyperlipidemia    • Hypertension    • IBS (irritable bowel syndrome)    • Osteoarthritis      Past Surgical History:   Procedure Laterality Date   • PARATHYROID GLAND SURGERY      resolved 05/12   • [place] ESOPHAGOGASTRODUODENOSCOPY TRANSORAL DIAGNOSTIC N/A 4/19/2017    Procedure: EGD AND COLONOSCOPY;  Surgeon: Belen Maloney MD;  Location: [facility]; Service: Gastroenterology   • UMBILICAL HERNIA REPAIR       Social History   Social History     Substance and Sexual Activity   Alcohol Use No     Social History     Substance and Sexual Activity   Drug Use No     Social History     Tobacco Use   Smoking Status Never   Smokeless Tobacco Never     Family History   Problem Relation Age of Onset   • Breast cancer Mother    • Heart defect Brother    • Arthritis Family    • Hypertension Family    • No Known Problems Father    • No Known Problems Brother        Meds/Allergies     (Not in a hospital admission)      Allergies   Allergen Reactions   • Nuts - Food Allergy Anaphylaxis     Raw Almonds only   • Codeine Other (See Comments)     Dizzy, lightheaded       Objective     Blood pressure 144/82, pulse 68, temperature 97 7 °F (36 5 °C), temperature source Temporal, resp  rate 16, height 5' 5\" (1 651 m), weight 81 3 kg (179 lb 3 7 oz), SpO2 98 %        PHYSICAL EXAM    /82   Pulse 68   Temp 97 7 °F (36 5 °C) (Temporal)   Resp 16   Ht 5' 5\" (1 651 m)   Wt 81 3 kg (179 " lb 3 7 oz)   SpO2 98%   BMI 29 83 kg/m²       Gen: NAD  CV: RRR  CHEST: Clear  ABD: soft, NT/ND  EXT: no edema      ASSESSMENT/PLAN:  This is a 68y o  year old female here for colonoscopy, and she is stable and optimized for her procedure

## 2023-05-25 NOTE — ANESTHESIA POSTPROCEDURE EVALUATION
Post-Op Assessment Note    CV Status:  Stable  Pain Score: 0    Pain management: adequate     Mental Status:  Arousable and sleepy   Hydration Status:  Euvolemic   PONV Controlled:  Controlled   Airway Patency:  Patent      Post Op Vitals Reviewed: Yes      Staff: CRNA         No notable events documented      BP   142/72   Temp      Pulse 78   Resp 18   SpO2 98% RA

## 2023-06-07 ENCOUNTER — TELEPHONE (OUTPATIENT)
Dept: GASTROENTEROLOGY | Facility: CLINIC | Age: 78
End: 2023-06-07

## 2023-06-07 NOTE — TELEPHONE ENCOUNTER
Called and spoke to patient  Gave patient test results as per Dr Joseph Tamez  Patient voiced understanding and had no further questions or concerns

## 2023-06-07 NOTE — TELEPHONE ENCOUNTER
----- Message from Abisai Eagle MD sent at 6/7/2023  8:14 AM EDT -----  Please review with the patient

## 2023-06-08 ENCOUNTER — APPOINTMENT (OUTPATIENT)
Dept: RADIOLOGY | Facility: CLINIC | Age: 78
End: 2023-06-08
Payer: MEDICARE

## 2023-06-08 ENCOUNTER — OFFICE VISIT (OUTPATIENT)
Dept: OBGYN CLINIC | Facility: CLINIC | Age: 78
End: 2023-06-08
Payer: MEDICARE

## 2023-06-08 VITALS
WEIGHT: 181 LBS | SYSTOLIC BLOOD PRESSURE: 150 MMHG | HEIGHT: 65 IN | BODY MASS INDEX: 30.16 KG/M2 | HEART RATE: 67 BPM | DIASTOLIC BLOOD PRESSURE: 89 MMHG

## 2023-06-08 DIAGNOSIS — M25.562 LEFT KNEE PAIN, UNSPECIFIED CHRONICITY: ICD-10-CM

## 2023-06-08 DIAGNOSIS — M17.12 PRIMARY OSTEOARTHRITIS OF LEFT KNEE: ICD-10-CM

## 2023-06-08 DIAGNOSIS — M25.561 RIGHT KNEE PAIN, UNSPECIFIED CHRONICITY: Primary | ICD-10-CM

## 2023-06-08 DIAGNOSIS — M25.561 RIGHT KNEE PAIN, UNSPECIFIED CHRONICITY: ICD-10-CM

## 2023-06-08 PROCEDURE — 73560 X-RAY EXAM OF KNEE 1 OR 2: CPT

## 2023-06-08 PROCEDURE — 99204 OFFICE O/P NEW MOD 45 MIN: CPT | Performed by: ORTHOPAEDIC SURGERY

## 2023-06-08 PROCEDURE — 73564 X-RAY EXAM KNEE 4 OR MORE: CPT

## 2023-06-08 PROCEDURE — 20610 DRAIN/INJ JOINT/BURSA W/O US: CPT | Performed by: ORTHOPAEDIC SURGERY

## 2023-06-08 RX ORDER — BUPIVACAINE HYDROCHLORIDE 2.5 MG/ML
2 INJECTION, SOLUTION INFILTRATION; PERINEURAL
Status: COMPLETED | OUTPATIENT
Start: 2023-06-08 | End: 2023-06-08

## 2023-06-08 RX ORDER — METHYLPREDNISOLONE ACETATE 40 MG/ML
2 INJECTION, SUSPENSION INTRA-ARTICULAR; INTRALESIONAL; INTRAMUSCULAR; SOFT TISSUE
Status: COMPLETED | OUTPATIENT
Start: 2023-06-08 | End: 2023-06-08

## 2023-06-08 RX ORDER — LIDOCAINE HYDROCHLORIDE 10 MG/ML
2 INJECTION, SOLUTION INFILTRATION; PERINEURAL
Status: COMPLETED | OUTPATIENT
Start: 2023-06-08 | End: 2023-06-08

## 2023-06-08 RX ADMIN — BUPIVACAINE HYDROCHLORIDE 2 ML: 2.5 INJECTION, SOLUTION INFILTRATION; PERINEURAL at 14:30

## 2023-06-08 RX ADMIN — LIDOCAINE HYDROCHLORIDE 2 ML: 10 INJECTION, SOLUTION INFILTRATION; PERINEURAL at 14:30

## 2023-06-08 RX ADMIN — METHYLPREDNISOLONE ACETATE 2 ML: 40 INJECTION, SUSPENSION INTRA-ARTICULAR; INTRALESIONAL; INTRAMUSCULAR; SOFT TISSUE at 14:30

## 2023-06-08 NOTE — PROGRESS NOTES
Patient Name:  Stefan Meadows  MRN:  [id number]    [address] [place] [address]     1  Right knee pain, unspecified chronicity  -     XR knee 1 or 2 vw right; Future; Expected date: 06/08/2023    2  Left knee pain, unspecified chronicity  -     XR knee 4+ vw left injury; Future; Expected date: 06/08/2023    3  Primary osteoarthritis of left knee  -     Large joint arthrocentesis: L knee      Bilateral knee osteoarthritis, Left > Right  · X-rays reviewed in office today with patient  · In depth conversation had with patient regarding nonoperative treatment and she wished to move forward with Left knee CSI  Tolerated procedure well  May be repeated every 3 months  Monitor blood glucose 3 times daily for the next 3 days  · Continue OTC medications as needed for pain relief  · Will hold off on Right knee CSI as patient is not having pain currently  · Follow-up in office on an as needed basis    Chief Complaint     Right knee pain    History of the Present Illness     Stefan Meadows is a 68 y o  female with bilateral knee pain, Left worse than Right  Patient admits the Left knee has been bothering her for a long time  She admits to difficulty with ambulating long distances  She admits she went to the ED on 05/18/2023 secondary to Right knee swelling and pain  She was provided crutches at the ED  Overall, she is moderatly improved from Right knee pain  IN regards to Left knee, her pain is located in the anterior portion of the knee, worse with motion and ambulating long distances  Patient has history of diabetes which is well controlled at this time  Review of Systems     Review of Systems   Constitutional: Negative for chills and fever  HENT: Negative for congestion  Respiratory: Negative for cough, chest tightness and shortness of breath  Cardiovascular: Negative for chest pain and palpitations  Gastrointestinal: Negative for abdominal pain  Endocrine: Negative for cold intolerance and heat intolerance  "  Neurological: Negative for syncope  Psychiatric/Behavioral: Negative for confusion  Physical Exam     /89   Pulse 67   Ht 5' 5\" (1 651 m)   Wt 82 1 kg (181 lb)   BMI 30 12 kg/m²     Left Knee  Range of motion from 0 to 130  There is mild patellofemoral crepitus with range of motion  There is no effusion  There is no tenderness over the medial or lateral joint lines  There is 5/5 quadriceps strength and preserved tone  The patient is able to perform a straight leg raise  positive  patellar grind test   Anterior drawer tests is negative  negative Lachman Test    Posterior drawer test is   negative   Varus stress testing reveals no pain or instability at 0 and 30 degrees   Valgus stress testing reveals no pain or instability at 0 and 30 degrees  The patient is neurovascular intact distally  Eyes:  Anicteric sclerae  Neck:  Supple  Lungs:  Normal respiratory effort  Cardiovascular:  Capillary refill is less than 2 seconds  Skin:  Intact without erythema  Neurologic:  Sensation grossly intact to light touch  Psychiatric:  Mood and affect are appropriate  Data Review     I have personally reviewed pertinent films in PACS, and my interpretation follows:    X-rays taken 06/08/2023 of Left knee demonstrates mild medial and lateral compartment degenerative changes and moderate to severe patellofemoral degenerative changes and joint space narrowing  No acute fracture or dislocation       Past Medical History:   Diagnosis Date   • Allergic    • Arthritis    • Diabetes mellitus ([address] )    • Diverticulitis of colon    • GERD (gastroesophageal reflux disease)    • Headache(784 0)    • HNP (herniated nucleus pulposus), lumbar    • Hyperlipidemia    • Hypertension    • IBS (irritable bowel syndrome)    • Osteoarthritis        Past Surgical History:   Procedure Laterality Date   • PARATHYROID GLAND SURGERY      resolved 05/12   • [place] ESOPHAGOGASTRODUODENOSCOPY TRANSORAL DIAGNOSTIC N/A " 4/19/2017    Procedure: EGD AND COLONOSCOPY;  Surgeon: Paresh Gallagher MD;  Location: [facility]; Service: Gastroenterology   • UMBILICAL HERNIA REPAIR         Allergies   Allergen Reactions   • Nuts - Food Allergy Anaphylaxis     Raw Almonds only   • Codeine Other (See Comments)     Dizzy, lightheaded       Current Outpatient Medications on File Prior to Visit   Medication Sig Dispense Refill   • Accu-Chek Guide test strip CHECK SUGAR DAILY 100 strip 2   • amLODIPine (NORVASC) 5 mg tablet Take 1 tablet (5 mg total) by mouth daily 90 tablet 3   • dicyclomine (BENTYL) 10 mg capsule Take 1 capsule (10 mg total) by mouth 3 (three) times a day as needed (abdominal pain) 60 capsule 2   • hydrocortisone (ANUSOL-HC) 2 5 % rectal cream Apply topically 2 (two) times a day 28 g 3   • levocetirizine (XYZAL) 5 MG tablet Take 5 mg by mouth as needed      • lisinopril (ZESTRIL) 10 mg tablet Take 1 tablet (10 mg total) by mouth daily 90 tablet 3   • lovastatin (MEVACOR) 20 mg tablet Take 1 tablet (20 mg total) by mouth daily at bedtime 90 tablet 3   • metFORMIN (GLUCOPHAGE-XR) 500 mg 24 hr tablet Take 2 tablets (1,000 mg total) by mouth 2 (two) times a day with meals 360 tablet 1   • potassium chloride (K-DUR,KLOR-CON) 20 mEq tablet Take 1 tablet (20 mEq total) by mouth 2 (two) times a day 20 tablet 0   • prednisoLONE acetate (PRED FORTE) 1 % ophthalmic suspension INSTILL 1 DROP INTO LEFT EYE TWICE A DAY     • hydrochlorothiazide (HYDRODIURIL) 25 mg tablet Take 0 5 tablets (12 5 mg total) by mouth daily (Patient not taking: Reported on 6/8/2023) 20 tablet 0   • predniSONE 50 mg tablet Take 1 tablet (50 mg total) by mouth daily Start 5/19 (Patient not taking: Reported on 6/8/2023) 6 tablet 0     No current facility-administered medications on file prior to visit         Social History     Tobacco Use   • Smoking status: Never   • Smokeless tobacco: Never   Vaping Use   • Vaping Use: Never used   Substance Use Topics   • Alcohol use: No   • Drug use: No       Family History   Problem Relation Age of Onset   • Breast cancer Mother    • Heart defect Brother    • Arthritis Family    • Hypertension Family    • No Known Problems Father    • No Known Problems Brother              Procedures Performed     Large joint arthrocentesis: L knee  Universal Protocol:  Risks and benefits: risks, benefits and alternatives were discussed  Consent given by: patient  Patient identity confirmed: verbally with patient    Procedure Details  Location: knee - L knee  Needle size: 22 G  Approach: lateral  Medications administered: 2 mL bupivacaine 0 25 %; 2 mL lidocaine 1 %; 2 mL methylPREDNISolone acetate 40 mg/mL    Patient tolerance: patient tolerated the procedure well with no immediate complications  Dressing:  Sterile dressing applied              Mikhail Martha

## 2023-07-05 ENCOUNTER — RA CDI HCC (OUTPATIENT)
Dept: OTHER | Facility: HOSPITAL | Age: 78
End: 2023-07-05

## 2023-07-05 NOTE — PROGRESS NOTES
E11.22  [address] [facility] coding opportunities          Chart Reviewed number of suggestions sent to Provider: 1     Patients Insurance     Medicare Insurance: [employer]

## 2023-08-31 ENCOUNTER — APPOINTMENT (OUTPATIENT)
Dept: LAB | Facility: CLINIC | Age: 78
End: 2023-08-31
Payer: MEDICARE

## 2023-08-31 DIAGNOSIS — E11.9 TYPE 2 DIABETES MELLITUS WITHOUT COMPLICATION, WITHOUT LONG-TERM CURRENT USE OF INSULIN (HCC): ICD-10-CM

## 2023-08-31 LAB
ALBUMIN SERPL BCP-MCNC: 3.8 G/DL (ref 3.5–5)
ALP SERPL-CCNC: 109 U/L (ref 34–104)
ALT SERPL W P-5'-P-CCNC: 17 U/L (ref 7–52)
ANION GAP SERPL CALCULATED.3IONS-SCNC: 9 MMOL/L
AST SERPL W P-5'-P-CCNC: 21 U/L (ref 13–39)
BASOPHILS # BLD AUTO: 0.03 THOUSANDS/ÂΜL (ref 0–0.1)
BASOPHILS NFR BLD AUTO: 1 % (ref 0–1)
BILIRUB SERPL-MCNC: 0.46 MG/DL (ref 0.2–1)
BUN SERPL-MCNC: 9 MG/DL (ref 5–25)
CALCIUM SERPL-MCNC: 10.5 MG/DL (ref 8.4–10.2)
CHLORIDE SERPL-SCNC: 104 MMOL/L (ref 96–108)
CHOLEST SERPL-MCNC: 196 MG/DL
CO2 SERPL-SCNC: 27 MMOL/L (ref 21–32)
CREAT SERPL-MCNC: 1.05 MG/DL (ref 0.6–1.3)
EOSINOPHIL # BLD AUTO: 0.17 THOUSAND/ÂΜL (ref 0–0.61)
EOSINOPHIL NFR BLD AUTO: 5 % (ref 0–6)
ERYTHROCYTE [DISTWIDTH] IN BLOOD BY AUTOMATED COUNT: 13.2 % (ref 11.6–15.1)
EST. AVERAGE GLUCOSE BLD GHB EST-MCNC: 151 MG/DL
GFR SERPL CREATININE-BSD FRML MDRD: 51 ML/MIN/1.73SQ M
GLUCOSE P FAST SERPL-MCNC: 98 MG/DL (ref 65–99)
HBA1C MFR BLD: 6.9 %
HCT VFR BLD AUTO: 42.6 % (ref 34.8–46.1)
HDLC SERPL-MCNC: 54 MG/DL
HGB BLD-MCNC: 13.2 G/DL (ref 11.5–15.4)
IMM GRANULOCYTES # BLD AUTO: 0 THOUSAND/UL (ref 0–0.2)
IMM GRANULOCYTES NFR BLD AUTO: 0 % (ref 0–2)
LDLC SERPL CALC-MCNC: 124 MG/DL (ref 0–100)
LYMPHOCYTES # BLD AUTO: 1.09 THOUSANDS/ÂΜL (ref 0.6–4.47)
LYMPHOCYTES NFR BLD AUTO: 35 % (ref 14–44)
MCH RBC QN AUTO: 28.9 PG (ref 26.8–34.3)
MCHC RBC AUTO-ENTMCNC: 31 G/DL (ref 31.4–37.4)
MCV RBC AUTO: 93 FL (ref 82–98)
MONOCYTES # BLD AUTO: 0.39 THOUSAND/ÂΜL (ref 0.17–1.22)
MONOCYTES NFR BLD AUTO: 12 % (ref 4–12)
NEUTROPHILS # BLD AUTO: 1.48 THOUSANDS/ÂΜL (ref 1.85–7.62)
NEUTS SEG NFR BLD AUTO: 47 % (ref 43–75)
NONHDLC SERPL-MCNC: 142 MG/DL
NRBC BLD AUTO-RTO: 0 /100 WBCS
PLATELET # BLD AUTO: 244 THOUSANDS/UL (ref 149–390)
PMV BLD AUTO: 10.2 FL (ref 8.9–12.7)
POTASSIUM SERPL-SCNC: 4 MMOL/L (ref 3.5–5.3)
PROT SERPL-MCNC: 7 G/DL (ref 6.4–8.4)
RBC # BLD AUTO: 4.56 MILLION/UL (ref 3.81–5.12)
SODIUM SERPL-SCNC: 140 MMOL/L (ref 135–147)
TRIGL SERPL-MCNC: 91 MG/DL
WBC # BLD AUTO: 3.16 THOUSAND/UL (ref 4.31–10.16)

## 2023-08-31 PROCEDURE — 80053 COMPREHEN METABOLIC PANEL: CPT

## 2023-08-31 PROCEDURE — 83036 HEMOGLOBIN GLYCOSYLATED A1C: CPT

## 2023-08-31 PROCEDURE — 85025 COMPLETE CBC W/AUTO DIFF WBC: CPT

## 2023-08-31 PROCEDURE — 80061 LIPID PANEL: CPT

## 2023-08-31 PROCEDURE — 36415 COLL VENOUS BLD VENIPUNCTURE: CPT

## 2023-09-01 ENCOUNTER — OFFICE VISIT (OUTPATIENT)
Dept: INTERNAL MEDICINE CLINIC | Facility: CLINIC | Age: 78
End: 2023-09-01
Payer: MEDICARE

## 2023-09-01 VITALS
BODY MASS INDEX: 30.12 KG/M2 | HEIGHT: 65 IN | HEART RATE: 74 BPM | DIASTOLIC BLOOD PRESSURE: 68 MMHG | OXYGEN SATURATION: 96 % | RESPIRATION RATE: 16 BRPM | TEMPERATURE: 97.6 F | SYSTOLIC BLOOD PRESSURE: 126 MMHG | WEIGHT: 180.8 LBS

## 2023-09-01 DIAGNOSIS — Z00.00 MEDICARE ANNUAL WELLNESS VISIT, SUBSEQUENT: Primary | ICD-10-CM

## 2023-09-01 DIAGNOSIS — E78.2 MIXED HYPERLIPIDEMIA: ICD-10-CM

## 2023-09-01 DIAGNOSIS — E11.9 TYPE 2 DIABETES MELLITUS WITHOUT COMPLICATION, WITHOUT LONG-TERM CURRENT USE OF INSULIN (HCC): ICD-10-CM

## 2023-09-01 DIAGNOSIS — I10 HYPERTENSION, BENIGN: ICD-10-CM

## 2023-09-01 DIAGNOSIS — N39.0 ACUTE URINARY TRACT INFECTION: ICD-10-CM

## 2023-09-01 PROCEDURE — G0439 PPPS, SUBSEQ VISIT: HCPCS | Performed by: INTERNAL MEDICINE

## 2023-09-01 PROCEDURE — 99214 OFFICE O/P EST MOD 30 MIN: CPT | Performed by: INTERNAL MEDICINE

## 2023-09-01 RX ORDER — SULFAMETHOXAZOLE AND TRIMETHOPRIM 800; 160 MG/1; MG/1
1 TABLET ORAL EVERY 12 HOURS SCHEDULED
Qty: 10 TABLET | Refills: 0 | Status: SHIPPED | OUTPATIENT
Start: 2023-09-01 | End: 2023-09-06

## 2023-09-01 RX ORDER — LOVASTATIN 20 MG/1
20 TABLET ORAL
Qty: 90 TABLET | Refills: 3 | Status: SHIPPED | OUTPATIENT
Start: 2023-09-01

## 2023-09-01 NOTE — PROGRESS NOTES
Assessment and Plan:     Problem List Items Addressed This Visit        Endocrine    Type 2 diabetes mellitus without complication, without long-term current use of insulin (HCC)    Relevant Orders    Hemoglobin A1C    Comprehensive metabolic panel    CBC and differential    Lipid Panel with Direct LDL reflex       Cardiovascular and Mediastinum    Hypertension, benign       Other    Mixed hyperlipidemia    Relevant Medications    lovastatin (MEVACOR) 20 mg tablet   Other Visit Diagnoses     Medicare annual wellness visit, subsequent    -  Primary    Acute urinary tract infection        Relevant Medications    sulfamethoxazole-trimethoprim (BACTRIM DS) 800-160 mg per tablet      She is going to make sure she gets back on track with her diabetes but it is still controlled. Continue her current dose of metformin at 1000 mg twice a day. Her chronic hypertension remains stable. Continue lisinopril 10 mg and amlodipine 5 mg daily. For her cholesterol, continue lovastatin 20 mg daily. For her frequent urination, follow through with urology consult. But given her increase in symptoms lately, will treat for UTI. Preventive health issues were discussed with patient, and age appropriate screening tests were ordered as noted in patient's After Visit Summary. Personalized health advice and appropriate referrals for health education or preventive services given if needed, as noted in patient's After Visit Summary. History of Present Illness:     Patient presents for a Medicare Wellness Visit    Patient comes in today for routine follow-up and Medicare wellness with her . Her sugars are up slightly. She has been taking her medicine and thinks she has been following her diet. She was on prednisone for a short time because of knee problems. Her blood pressure remains controlled. Her cholesterol continues to improve. She states she is taking her medicines.   Still having trouble with the frequent urination. And it seems to have worsened and now she has some right-sided mid back pain. She states she did not receive a call from urology from the consult placed last time. There is no number on the paper for her to call. Patient Care Team:  Julius Flores MD as PCP - [address] [place] [address], MD Eliceo Duran MD as Endoscopist     Review of Systems:     Review of Systems   Constitutional: Negative for fever. Respiratory: Negative for shortness of breath. Cardiovascular: Negative for chest pain. Gastrointestinal: Negative for abdominal pain. Problem List:     Patient Active Problem List   Diagnosis   • Allergic rhinitis   • Mixed hyperlipidemia   • Hypertension, benign   • Irritable bowel syndrome without diarrhea   • Type 2 diabetes mellitus without complication, without long-term current use of insulin (HCC)   • Screening for malignant neoplasm of breast   • Abnormal mammogram   • Breast pain, right   • Paresthesia of both feet   • Bilateral cold feet   • Chronic bilateral low back pain   • Speech defect   • Other headache syndrome   • History of colon polyps      Past Medical and Surgical History:     Past Medical History:   Diagnosis Date   • Allergic    • Arthritis    • Diabetes mellitus ([address])    • Diverticulitis of colon    • GERD (gastroesophageal reflux disease)    • Headache(784.0)    • HNP (herniated nucleus pulposus), lumbar    • Hyperlipidemia    • Hypertension    • IBS (irritable bowel syndrome)    • Osteoarthritis      Past Surgical History:   Procedure Laterality Date   • PARATHYROID GLAND SURGERY      resolved 05/12   • AR ESOPHAGOGASTRODUODENOSCOPY TRANSORAL DIAGNOSTIC N/A 4/19/2017    Procedure: EGD AND COLONOSCOPY;  Surgeon: Eliceo Duran MD;  Location: [facility];   Service: Gastroenterology   • UMBILICAL HERNIA REPAIR        Family History:     Family History   Problem Relation Age of Onset   • Breast cancer Mother    • Heart defect Brother    • Arthritis Family    • Hypertension Family    • No Known Problems Father    • No Known Problems Brother       Social History:     Social History     Socioeconomic History   • Marital status: /Civil Union     Spouse name: None   • Number of children: 2   • Years of education: None   • Highest education level: None   Occupational History   • Occupation: 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[employer]     Comment: retired   Tobacco Use   • Smoking status: Never   • Smokeless tobacco: Never   Vaping Use   • Vaping Use: Never used   Substance and Sexual Activity   • Alcohol use: No   • Drug use: No   • Sexual activity: Yes     Partners: Male   Other Topics Concern   • None   Social History Narrative   • None     Social Determinants of Health     Financial Resource Strain: Low Risk  (8/30/2023)    Overall Financial Resource Strain (CARDIA)    • Difficulty of Paying Living Expenses: Not hard at all   Food Insecurity: Not on file   Transportation Needs: No Transportation Needs (8/30/2023)    PRAPARE - Transportation    • Lack of Transportation (Medical): No    • Lack of Transportation (Non-Medical):  No   Physical Activity: Not on file   Stress: Not on file   Social Connections: Not on file   Intimate Partner Violence: Not on file   Housing Stability: Not on file      Medications and Allergies:     Current Outpatient Medications   Medication Sig Dispense Refill   • Accu-Chek Guide test strip CHECK SUGAR DAILY 100 strip 2   • amLODIPine (NORVASC) 5 mg tablet Take 1 tablet (5 mg total) by mouth daily 90 tablet 3   • dicyclomine (BENTYL) 10 mg capsule Take 1 capsule (10 mg total) by mouth 3 (three) times a day as needed (abdominal pain) 60 capsule 2   • hydrocortisone (ANUSOL-HC) 2.5 % rectal cream Apply topically 2 (two) times a day 28 g 3   • levocetirizine (XYZAL) 5 MG tablet Take 5 mg by mouth as needed      • lisinopril (ZESTRIL) 10 mg tablet Take 1 tablet (10 mg total) by mouth daily 90 tablet 3   • lovastatin (MEVACOR) 20 mg tablet Take 1 tablet (20 mg total) by mouth daily at bedtime 90 tablet 3   • metFORMIN (GLUCOPHAGE-XR) 500 mg 24 hr tablet Take 2 tablets (1,000 mg total) by mouth 2 (two) times a day with meals 360 tablet 1   • sulfamethoxazole-trimethoprim (BACTRIM DS) 800-160 mg per tablet Take 1 tablet by mouth every 12 (twelve) hours for 5 days 10 tablet 0     No current facility-administered medications for this visit. Allergies   Allergen Reactions   • Nuts - Food Allergy Anaphylaxis     Raw Almonds only   • Codeine Other (See Comments)     Dizzy, lightheaded      Immunizations:     Immunization History   Administered Date(s) Administered   • INFLUENZA 12/15/2010   • Influenza Split High Dose Preservative Free IM 12/05/2017   • Influenza, high dose seasonal 0.7 mL 10/05/2018, 09/23/2019, 11/10/2020, 10/15/2021, 10/24/2022   • Pneumococcal Conjugate 13-Valent 12/05/2017   • Pneumococcal Polysaccharide PPV23 12/15/2010, 05/14/2019      Health Maintenance:         Topic Date Due   • Hepatitis C Screening  Never done   • Breast Cancer Screening: Mammogram  05/17/2024   • Colorectal Cancer Screening  Discontinued         Topic Date Due   • COVID-19 Vaccine (1) Never done   • Influenza Vaccine (1) 09/01/2023      Medicare Screening Tests and Risk Assessments:     Ann Healy is here for her Subsequent Wellness visit. Health Risk Assessment:   Patient rates overall health as good. Patient feels that their physical health rating is same. Patient is very satisfied with their life. Eyesight was rated as same. Hearing was rated as same. Patient feels that their emotional and mental health rating is same. Patients states they are never, rarely angry. Patient states they are sometimes unusually tired/fatigued. Pain experienced in the last 7 days has been some. Patient's pain rating has been 8/10. Patient states that she has experienced no weight loss or gain in last 6 months. Depression Screening:   PHQ-2 Score: 0      Fall Risk Screening:    In the past year, patient has experienced: no history of falling in past year      Urinary Incontinence Screening:   Patient has not leaked urine accidently in the last six months. Home Safety:  Patient does not have trouble with stairs inside or outside of their home. Patient has working smoke alarms and has working carbon monoxide detector. Home safety hazards include: none. Nutrition:   Current diet is Regular. Medications:   Patient is currently taking over-the-counter supplements. OTC medications include: see medication list. Patient is able to manage medications. Activities of Daily Living (ADLs)/Instrumental Activities of Daily Living (IADLs):   Walk and transfer into and out of bed and chair?: Yes  Dress and groom yourself?: Yes    Bathe or shower yourself?: Yes    Feed yourself?  Yes  Do your laundry/housekeeping?: Yes  Manage your money, pay your bills and track your expenses?: Yes  Make your own meals?: Yes    Do your own shopping?: Yes    Previous Hospitalizations:   Any hospitalizations or ED visits within the last 12 months?: No      Advance Care Planning:   Living will: Yes    Advanced directive: Yes    Advanced directive counseling given: Yes      Cognitive Screening:   Provider or family/friend/caregiver concerned regarding cognition?: No    PREVENTIVE SCREENINGS      Cardiovascular Screening:    General: Screening Not Indicated and History Lipid Disorder      Diabetes Screening:     General: Screening Not Indicated and History Diabetes      Colorectal Cancer Screening:     General: Screening Current      Breast Cancer Screening:     General: Screening Current      Cervical Cancer Screening:    General: Screening Not Indicated      Osteoporosis Screening:    General: Screening Current      Abdominal Aortic Aneurysm (AAA) Screening:        General: Screening Not Indicated      Lung Cancer Screening:     General: Screening Not Indicated      Hepatitis C Screening:    General: Screening Not Indicated    Screening, Brief Intervention, and Referral to Treatment (SBIRT)    Screening  Typical number of drinks in a day: 0  Typical number of drinks in a week: 0  Interpretation: Low risk drinking behavior. AUDIT-C Screenin) How often did you have a drink containing alcohol in the past year? never  2) How many drinks did you have on a typical day when you were drinking in the past year? 0  3) How often did you have 6 or more drinks on one occasion in the past year? never    AUDIT-C Score: 0  Interpretation: Score 0-2 (female): Negative screen for alcohol misuse    Single Item Drug Screening:  How often have you used an illegal drug (including marijuana) or a prescription medication for non-medical reasons in the past year? never    Single Item Drug Screen Score: 0  Interpretation: Negative screen for possible drug use disorder    Brief Intervention  Alcohol & drug use screenings were reviewed. No concerns regarding substance use disorder identified. No results found. Physical Exam:     /68 (BP Location: Left arm, Patient Position: Sitting, Cuff Size: Adult)   Pulse 74   Temp 97.6 °F (36.4 °C) (Tympanic)   Resp 16   Ht 5' 5" (1.651 m)   Wt 82 kg (180 lb 12.8 oz)   SpO2 96%   BMI 30.09 kg/m²     Physical Exam  Vitals and nursing note reviewed. Constitutional:       Appearance: She is well-developed. Cardiovascular:      Rate and Rhythm: Normal rate and regular rhythm. Pulmonary:      Effort: Pulmonary effort is normal.      Breath sounds: Normal breath sounds. Abdominal:      General: Abdomen is flat. Tenderness: There is no abdominal tenderness. There is no right CVA tenderness. Musculoskeletal:      Right lower leg: No edema. Left lower leg: No edema. Neurological:      Mental Status: She is alert and oriented to person, place, and time. Psychiatric:         Behavior: Behavior normal.         Thought Content:  Thought content normal.         Judgment: Judgment normal.          Chris Steen MD

## 2023-09-01 NOTE — PATIENT INSTRUCTIONS
Medicare Preventive Visit Patient Instructions  Thank you for completing your Welcome to Medicare Visit or Medicare Annual Wellness Visit today. Your next wellness visit will be due in one year (9/1/2024). The screening/preventive services that you may require over the next 5-10 years are detailed below. Some tests may not apply to you based off risk factors and/or age. Screening tests ordered at today's visit but not completed yet may show as past due. Also, please note that scanned in results may not display below. Preventive Screenings:  Service Recommendations Previous Testing/Comments   Colorectal Cancer Screening  * Colonoscopy    * Fecal Occult Blood Test (FOBT)/Fecal Immunochemical Test (FIT)  * Fecal DNA/Cologuard Test  * Flexible Sigmoidoscopy Age: 43-73 years old   Colonoscopy: every 10 years (may be performed more frequently if at higher risk)  OR  FOBT/FIT: every 1 year  OR  Cologuard: every 3 years  OR  Sigmoidoscopy: every 5 years  Screening may be recommended earlier than age 39 if at higher risk for colorectal cancer. Also, an individualized decision between you and your healthcare provider will decide whether screening between the ages of 77-80 would be appropriate. Colonoscopy: 05/25/2023  FOBT/FIT: Not on file  Cologuard: Not on file  Sigmoidoscopy: Not on file    Screening Current     Breast Cancer Screening Age: 36 years old  Frequency: every 1-2 years  Not required if history of left and right mastectomy Mammogram: 05/17/2023    Screening Current   Cervical Cancer Screening Between the ages of 21-29, pap smear recommended once every 3 years. Between the ages of 32-69, can perform pap smear with HPV co-testing every 5 years.    Recommendations may differ for women with a history of total hysterectomy, cervical cancer, or abnormal pap smears in past. Pap Smear: Not on file    Screening Not Indicated   Hepatitis C Screening Once for adults born between 1945 and 1965  More frequently in patients at high risk for Hepatitis C Hep C Antibody: Not on file        Diabetes Screening 1-2 times per year if you're at risk for diabetes or have pre-diabetes Fasting glucose: 98 mg/dL (8/31/2023)  A1C: 6.9 % (8/31/2023)  Screening Not Indicated  History Diabetes   Cholesterol Screening Once every 5 years if you don't have a lipid disorder. May order more often based on risk factors. Lipid panel: 08/31/2023    Screening Not Indicated  History Lipid Disorder     Other Preventive Screenings Covered by Medicare:  1. Abdominal Aortic Aneurysm (AAA) Screening: covered once if your at risk. You're considered to be at risk if you have a family history of AAA. 2. Lung Cancer Screening: covers low dose CT scan once per year if you meet all of the following conditions: (1) Age 48-67; (2) No signs or symptoms of lung cancer; (3) Current smoker or have quit smoking within the last 15 years; (4) You have a tobacco smoking history of at least 20 pack years (packs per day multiplied by number of years you smoked); (5) You get a written order from a healthcare provider. 3. Glaucoma Screening: covered annually if you're considered high risk: (1) You have diabetes OR (2) Family history of glaucoma OR (3)  aged 48 and older OR (3)  American aged 72 and older  3. Osteoporosis Screening: covered every 2 years if you meet one of the following conditions: (1) You're estrogen deficient and at risk for osteoporosis based off medical history and other findings; (2) Have a vertebral abnormality; (3) On glucocorticoid therapy for more than 3 months; (4) Have primary hyperparathyroidism; (5) On osteoporosis medications and need to assess response to drug therapy. · Last bone density test (DXA Scan): 08/02/2022.  5. HIV Screening: covered annually if you're between the age of 15-65. Also covered annually if you are younger than 13 and older than 72 with risk factors for HIV infection.  For pregnant patients, it is covered up to 3 times per pregnancy. Immunizations:  Immunization Recommendations   Influenza Vaccine Annual influenza vaccination during flu season is recommended for all persons aged >= 6 months who do not have contraindications   Pneumococcal Vaccine   * Pneumococcal conjugate vaccine = PCV13 (Prevnar 13), PCV15 (Vaxneuvance), PCV20 (Prevnar 20)  * Pneumococcal polysaccharide vaccine = PPSV23 (Pneumovax) Adults 20-63 years old: 1-3 doses may be recommended based on certain risk factors  Adults 72 years old: 1-2 doses may be recommended based off what pneumonia vaccine you previously received   Hepatitis B Vaccine 3 dose series if at intermediate or high risk (ex: diabetes, end stage renal disease, liver disease)   Tetanus (Td) Vaccine - COST NOT COVERED BY MEDICARE PART B Following completion of primary series, a booster dose should be given every 10 years to maintain immunity against tetanus. Td may also be given as tetanus wound prophylaxis. Tdap Vaccine - COST NOT COVERED BY MEDICARE PART B Recommended at least once for all adults. For pregnant patients, recommended with each pregnancy. Shingles Vaccine (Shingrix) - COST NOT COVERED BY MEDICARE PART B  2 shot series recommended in those aged 48 and above     Health Maintenance Due:      Topic Date Due   • Hepatitis C Screening  Never done   • Breast Cancer Screening: Mammogram  05/17/2024   • Colorectal Cancer Screening  Discontinued     Immunizations Due:      Topic Date Due   • COVID-19 Vaccine (1) Never done   • Influenza Vaccine (1) 09/01/2023     Advance Directives   What are advance directives? Advance directives are legal documents that state your wishes and plans for medical care. These plans are made ahead of time in case you lose your ability to make decisions for yourself. Advance directives can apply to any medical decision, such as the treatments you want, and if you want to donate organs. What are the types of advance directives? There are many types of advance directives, and each state has rules about how to use them. You may choose a combination of any of the following:  · Living will: This is a written record of the treatment you want. You can also choose which treatments you do not want, which to limit, and which to stop at a certain time. This includes surgery, medicine, IV fluid, and tube feedings. · Durable power of  for healthcare [facility]): This is a written record that states who you want to make healthcare choices for you when you are unable to make them for yourself. This person, called a proxy, is usually a family member or a friend. You may choose more than 1 proxy. · Do not resuscitate (DNR) order:  A DNR order is used in case your heart stops beating or you stop breathing. It is a request not to have certain forms of treatment, such as CPR. A DNR order may be included in other types of advance directives. · Medical directive: This covers the care that you want if you are in a coma, near death, or unable to make decisions for yourself. You can list the treatments you want for each condition. Treatment may include pain medicine, surgery, blood transfusions, dialysis, IV or tube feedings, and a ventilator (breathing machine). · Values history: This document has questions about your views, beliefs, and how you feel and think about life. This information can help others choose the care that you would choose. Why are advance directives important? An advance directive helps you control your care. Although spoken wishes may be used, it is better to have your wishes written down. Spoken wishes can be misunderstood, or not followed. Treatments may be given even if you do not want them. An advance directive may make it easier for your family to make difficult choices about your care.    Weight Management   Why it is important to manage your weight:  Being overweight increases your risk of health conditions such as heart disease, high blood pressure, type 2 diabetes, and certain types of cancer. It can also increase your risk for osteoarthritis, sleep apnea, and other respiratory problems. Aim for a slow, steady weight loss. Even a small amount of weight loss can lower your risk of health problems. How to lose weight safely:  A safe and healthy way to lose weight is to eat fewer calories and get regular exercise. You can lose up about 1 pound a week by decreasing the number of calories you eat by 500 calories each day. Healthy meal plan for weight management:  A healthy meal plan includes a variety of foods, contains fewer calories, and helps you stay healthy. A healthy meal plan includes the following:  · Eat whole-grain foods more often. A healthy meal plan should contain fiber. Fiber is the part of grains, fruits, and vegetables that is not broken down by your body. Whole-grain foods are healthy and provide extra fiber in your diet. Some examples of whole-grain foods are whole-wheat breads and pastas, oatmeal, brown rice, and bulgur. · Eat a variety of vegetables every day. Include dark, leafy greens such as spinach, kale, jackie greens, and mustard greens. Eat yellow and orange vegetables such as carrots, sweet potatoes, and winter squash. · Eat a variety of fruits every day. Choose fresh or canned fruit (canned in its own juice or light syrup) instead of juice. Fruit juice has very little or no fiber. · Eat low-fat dairy foods. Drink fat-free (skim) milk or 1% milk. Eat fat-free yogurt and low-fat cottage cheese. Try low-fat cheeses such as mozzarella and other reduced-fat cheeses. · Choose meat and other protein foods that are low in fat. Choose beans or other legumes such as split peas or lentils. Choose fish, skinless poultry (chicken or turkey), or lean cuts of red meat (beef or pork). Before you cook meat or poultry, cut off any visible fat. · Use less fat and oil. Try baking foods instead of frying them. Add less fat, such as margarine, sour cream, regular salad dressing and mayonnaise to foods. Eat fewer high-fat foods. Some examples of high-fat foods include french fries, doughnuts, ice cream, and cakes. · Eat fewer sweets. Limit foods and drinks that are high in sugar. This includes candy, cookies, regular soda, and sweetened drinks. Exercise:  Exercise at least 30 minutes per day on most days of the week. Some examples of exercise include walking, biking, dancing, and swimming. You can also fit in more physical activity by taking the stairs instead of the elevator or parking farther away from stores. Ask your healthcare provider about the best exercise plan for you. © Copyright [employer] 2018 Information is for End User's use only and may not be sold, redistributed or otherwise used for commercial purposes.  All illustrations and images included in CareNotes® are the copyrighted property of [employer] or [address] [place] [address]

## 2023-11-03 ENCOUNTER — OFFICE VISIT (OUTPATIENT)
Dept: GASTROENTEROLOGY | Facility: CLINIC | Age: 78
End: 2023-11-03
Payer: MEDICARE

## 2023-11-03 VITALS
SYSTOLIC BLOOD PRESSURE: 143 MMHG | DIASTOLIC BLOOD PRESSURE: 87 MMHG | HEART RATE: 75 BPM | BODY MASS INDEX: 29.22 KG/M2 | HEIGHT: 65 IN | WEIGHT: 175.4 LBS

## 2023-11-03 DIAGNOSIS — R10.84 GENERALIZED ABDOMINAL PAIN: Primary | ICD-10-CM

## 2023-11-03 DIAGNOSIS — K58.9 IRRITABLE BOWEL SYNDROME WITHOUT DIARRHEA: ICD-10-CM

## 2023-11-03 PROCEDURE — 99213 OFFICE O/P EST LOW 20 MIN: CPT | Performed by: PHYSICIAN ASSISTANT

## 2023-11-03 NOTE — PROGRESS NOTES
[facility] - Outpatient Follow-up Note  Nelida Magana 68 y.o. female MRN: [id number]  Encounter: [id number]          ASSESSMENT AND PLAN:      1. Generalized abdominal pain  2. Irritable bowel syndrome without diarrhea  This is her likely diagnosis however she is worried something is being missed  Given significance of pain will plan CT AP  She has occasional heartburn and dysphagia  Will plan EGD    She is on Fiber and probiotic  Dicyclomine does not help her pain  Will trial Gas-X  Will give a Xifaxan course    ______________________________________________________________________    SUBJECTIVE: 28-year-old female with a history of recurrent gastrointestinal symptoms who presents for follow-up. She reports that all of her symptoms began in 1993 when she was in [place]. She reports that she developed an acute onset of abdominal pain and diarrhea. She went to the emergency room and does not remember what she was diagnosed with but was put on a 10-day treatment course of something and her symptoms improved. She reports time at least once a year she would have a recurrence of the symptoms which would last for several days before resolving. She reports that over the past few years it has become more frequent to where it is at least twice a year. Over the past few months it has become more frequent to where it is almost twice a month. She reports that the pain always starts first.  It is quite severe and tends to start in the left upper quadrant and radiate across the upper abdomen into the right upper quadrant. Sometimes she will have a collection of pain in the right flank. She reports that she always fluctuates between diarrhea and constipation. She occasionally has a normal stool. She always feels gassy and bloated. She has no rectal bleeding or weight loss. She had a colonoscopy in May of this year with a polyp removed.   She had an upper endoscopy in 2017 which was a normal exam.  I do not see any recent imaging of her abdomen. She is concerned about the possibility of a pancreas tumor. She is currently on fiber and probiotic which she has been doing consistently for the past 1 to 2 months. She does not think this is helping. She has a prescription for dicyclomine at home which she uses as needed for pain but it does not help. She reports occasional episodes of dysphagia specifically to steak. She reports occasional episodes of heartburn. She has no nausea or vomiting. REVIEW OF SYSTEMS IS OTHERWISE NEGATIVE. Historical Information   Past Medical History:   Diagnosis Date    Allergic     Arthritis     Diabetes mellitus ([address])     Diverticulitis of colon     GERD (gastroesophageal reflux disease)     Headache(784.0)     HNP (herniated nucleus pulposus), lumbar     Hyperlipidemia     Hypertension     IBS (irritable bowel syndrome)     Osteoarthritis      Past Surgical History:   Procedure Laterality Date    PARATHYROID GLAND SURGERY      resolved 05/12    [place] ESOPHAGOGASTRODUODENOSCOPY TRANSORAL DIAGNOSTIC N/A 4/19/2017    Procedure: EGD AND COLONOSCOPY;  Surgeon: Phill Cullen MD;  Location: [facility];   Service: Gastroenterology    UMBILICAL HERNIA REPAIR       Social History   Social History     Substance and Sexual Activity   Alcohol Use No     Social History     Substance and Sexual Activity   Drug Use No     Social History     Tobacco Use   Smoking Status Never   Smokeless Tobacco Never     Family History   Problem Relation Age of Onset    Breast cancer Mother     Heart defect Brother     Arthritis Family     Hypertension Family     No Known Problems Father     No Known Problems Brother        Meds/Allergies       Current Outpatient Medications:     Accu-Chek Guide test strip    amLODIPine (NORVASC) 5 mg tablet    dicyclomine (BENTYL) 10 mg capsule    hydrocortisone (ANUSOL-HC) 2.5 % rectal cream    levocetirizine (XYZAL) 5 MG tablet    lisinopril (ZESTRIL) 10 mg tablet    lovastatin (MEVACOR) 20 mg tablet    metFORMIN (GLUCOPHAGE-XR) 500 mg 24 hr tablet    rifaximin (XIFAXAN) 550 mg tablet    Allergies   Allergen Reactions    Nuts - Food Allergy Anaphylaxis     Raw Almonds only    Codeine Other (See Comments)     Dizzy, lightheaded           Objective     Blood pressure 143/87, pulse 75, height 5' 5" (1.651 m), weight 79.6 kg (175 lb 6.4 oz). Body mass index is 29.19 kg/m². PHYSICAL EXAM:      General Appearance:   Alert, cooperative, no distress   HEENT:   Normocephalic, atraumatic, anicteric. Neck:  Supple, symmetrical, trachea midline   Lungs:   Clear to auscultation bilaterally; no rales, rhonchi or wheezing; respirations unlabored    Heart[de-identified]   Regular rate and rhythm; no murmur, rub, or gallop. Abdomen:   Soft, non-tender, non-distended; normal bowel sounds; no masses, no organomegaly    Genitalia:   Deferred    Rectal:   Deferred    Extremities:  No cyanosis, clubbing or edema    Pulses:  2+ and symmetric    Skin:  No jaundice, rashes, or lesions    Lymph nodes:  No palpable cervical lymphadenopathy        Lab Results:   No visits with results within 1 Day(s) from this visit.    Latest known visit with results is:   Appointment on 08/31/2023   Component Date Value    Sodium 08/31/2023 140     Potassium 08/31/2023 4.0     Chloride 08/31/2023 104     CO2 08/31/2023 27     ANION GAP 08/31/2023 9     BUN 08/31/2023 9     Creatinine 08/31/2023 1.05     Glucose, Fasting 08/31/2023 98     Calcium 08/31/2023 10.5 (H)     AST 08/31/2023 21     ALT 08/31/2023 17     Alkaline Phosphatase 08/31/2023 109 (H)     Total Protein 08/31/2023 7.0     Albumin 08/31/2023 3.8     Total Bilirubin 08/31/2023 0.46     eGFR 08/31/2023 51     WBC 08/31/2023 3.16 (L)     RBC 08/31/2023 4.56     Hemoglobin 08/31/2023 13.2     Hematocrit 08/31/2023 42.6     MCV 08/31/2023 93     MCH 08/31/2023 28.9     MCHC 08/31/2023 31.0 (L)     RDW 08/31/2023 13.2     MPV 08/31/2023 10.2 Platelets 59/60/3208 244     nRBC 08/31/2023 0     Neutrophils Relative 08/31/2023 47     Immat GRANS % 08/31/2023 0     Lymphocytes Relative 08/31/2023 35     Monocytes Relative 08/31/2023 12     Eosinophils Relative 08/31/2023 5     Basophils Relative 08/31/2023 1     Neutrophils Absolute 08/31/2023 1.48 (L)     Immature Grans Absolute 08/31/2023 0.00     Lymphocytes Absolute 08/31/2023 1.09     Monocytes Absolute 08/31/2023 0.39     Eosinophils Absolute 08/31/2023 0.17     Basophils Absolute 08/31/2023 0.03     Hemoglobin A1C 08/31/2023 6.9 (H)     EAG 08/31/2023 151     Cholesterol 08/31/2023 196     Triglycerides 08/31/2023 91     HDL, Direct 08/31/2023 54     LDL Calculated 08/31/2023 124 (H)     Non-HDL-Chol (CHOL-HDL) 08/31/2023 142          Radiology Results:   No results found. Answers submitted by the patient for this visit:  Abdominal Pain Questionnaire (Submitted on 11/3/2023)  Chief Complaint: Abdominal pain  Chronicity: recurrent  Onset: more than 1 year ago  Onset quality: gradual  Frequency: every several days  Progression since onset: gradually worsening  Pain location: RLQ  Pain - numeric: 9/10  Pain quality: aching  Radiates to: epigastric region  anorexia: Yes  diarrhea: Yes  flatus: Yes  frequency: Yes  nausea:  Yes  Aggravated by: nothing  Relieved by: nothing, vomiting  Diagnostic workup: lower endoscopy

## 2023-11-03 NOTE — H&P (VIEW-ONLY)
[facility] - Outpatient Follow-up Note  Gala Simmons 68 y.o. female MRN: [id number]  Encounter: [id number]          ASSESSMENT AND PLAN:      1. Generalized abdominal pain  2. Irritable bowel syndrome without diarrhea  This is her likely diagnosis however she is worried something is being missed  Given significance of pain will plan CT AP  She has occasional heartburn and dysphagia  Will plan EGD    She is on Fiber and probiotic  Dicyclomine does not help her pain  Will trial Gas-X  Will give a Xifaxan course    ______________________________________________________________________    SUBJECTIVE: 42-year-old female with a history of recurrent gastrointestinal symptoms who presents for follow-up. She reports that all of her symptoms began in 1993 when she was in [place]. She reports that she developed an acute onset of abdominal pain and diarrhea. She went to the emergency room and does not remember what she was diagnosed with but was put on a 10-day treatment course of something and her symptoms improved. She reports time at least once a year she would have a recurrence of the symptoms which would last for several days before resolving. She reports that over the past few years it has become more frequent to where it is at least twice a year. Over the past few months it has become more frequent to where it is almost twice a month. She reports that the pain always starts first.  It is quite severe and tends to start in the left upper quadrant and radiate across the upper abdomen into the right upper quadrant. Sometimes she will have a collection of pain in the right flank. She reports that she always fluctuates between diarrhea and constipation. She occasionally has a normal stool. She always feels gassy and bloated. She has no rectal bleeding or weight loss. She had a colonoscopy in May of this year with a polyp removed.   She had an upper endoscopy in 2017 which was a normal exam.  I do not see any recent imaging of her abdomen. She is concerned about the possibility of a pancreas tumor. She is currently on fiber and probiotic which she has been doing consistently for the past 1 to 2 months. She does not think this is helping. She has a prescription for dicyclomine at home which she uses as needed for pain but it does not help. She reports occasional episodes of dysphagia specifically to steak. She reports occasional episodes of heartburn. She has no nausea or vomiting. REVIEW OF SYSTEMS IS OTHERWISE NEGATIVE. Historical Information   Past Medical History:   Diagnosis Date    Allergic     Arthritis     Diabetes mellitus ([address])     Diverticulitis of colon     GERD (gastroesophageal reflux disease)     Headache(784.0)     HNP (herniated nucleus pulposus), lumbar     Hyperlipidemia     Hypertension     IBS (irritable bowel syndrome)     Osteoarthritis      Past Surgical History:   Procedure Laterality Date    PARATHYROID GLAND SURGERY      resolved 05/12    [place] ESOPHAGOGASTRODUODENOSCOPY TRANSORAL DIAGNOSTIC N/A 4/19/2017    Procedure: EGD AND COLONOSCOPY;  Surgeon: Ming Mcgee MD;  Location: [facility];   Service: Gastroenterology    UMBILICAL HERNIA REPAIR       Social History   Social History     Substance and Sexual Activity   Alcohol Use No     Social History     Substance and Sexual Activity   Drug Use No     Social History     Tobacco Use   Smoking Status Never   Smokeless Tobacco Never     Family History   Problem Relation Age of Onset    Breast cancer Mother     Heart defect Brother     Arthritis Family     Hypertension Family     No Known Problems Father     No Known Problems Brother        Meds/Allergies       Current Outpatient Medications:     Accu-Chek Guide test strip    amLODIPine (NORVASC) 5 mg tablet    dicyclomine (BENTYL) 10 mg capsule    hydrocortisone (ANUSOL-HC) 2.5 % rectal cream    levocetirizine (XYZAL) 5 MG tablet    lisinopril (ZESTRIL) 10 mg tablet    lovastatin (MEVACOR) 20 mg tablet    metFORMIN (GLUCOPHAGE-XR) 500 mg 24 hr tablet    rifaximin (XIFAXAN) 550 mg tablet    Allergies   Allergen Reactions    Nuts - Food Allergy Anaphylaxis     Raw Almonds only    Codeine Other (See Comments)     Dizzy, lightheaded           Objective     Blood pressure 143/87, pulse 75, height 5' 5" (1.651 m), weight 79.6 kg (175 lb 6.4 oz). Body mass index is 29.19 kg/m². PHYSICAL EXAM:      General Appearance:   Alert, cooperative, no distress   HEENT:   Normocephalic, atraumatic, anicteric. Neck:  Supple, symmetrical, trachea midline   Lungs:   Clear to auscultation bilaterally; no rales, rhonchi or wheezing; respirations unlabored    Heart[de-identified]   Regular rate and rhythm; no murmur, rub, or gallop. Abdomen:   Soft, non-tender, non-distended; normal bowel sounds; no masses, no organomegaly    Genitalia:   Deferred    Rectal:   Deferred    Extremities:  No cyanosis, clubbing or edema    Pulses:  2+ and symmetric    Skin:  No jaundice, rashes, or lesions    Lymph nodes:  No palpable cervical lymphadenopathy        Lab Results:   No visits with results within 1 Day(s) from this visit.    Latest known visit with results is:   Appointment on 08/31/2023   Component Date Value    Sodium 08/31/2023 140     Potassium 08/31/2023 4.0     Chloride 08/31/2023 104     CO2 08/31/2023 27     ANION GAP 08/31/2023 9     BUN 08/31/2023 9     Creatinine 08/31/2023 1.05     Glucose, Fasting 08/31/2023 98     Calcium 08/31/2023 10.5 (H)     AST 08/31/2023 21     ALT 08/31/2023 17     Alkaline Phosphatase 08/31/2023 109 (H)     Total Protein 08/31/2023 7.0     Albumin 08/31/2023 3.8     Total Bilirubin 08/31/2023 0.46     eGFR 08/31/2023 51     WBC 08/31/2023 3.16 (L)     RBC 08/31/2023 4.56     Hemoglobin 08/31/2023 13.2     Hematocrit 08/31/2023 42.6     MCV 08/31/2023 93     MCH 08/31/2023 28.9     MCHC 08/31/2023 31.0 (L)     RDW 08/31/2023 13.2     MPV 08/31/2023 10.2 Platelets 65/93/3278 244     nRBC 08/31/2023 0     Neutrophils Relative 08/31/2023 47     Immat GRANS % 08/31/2023 0     Lymphocytes Relative 08/31/2023 35     Monocytes Relative 08/31/2023 12     Eosinophils Relative 08/31/2023 5     Basophils Relative 08/31/2023 1     Neutrophils Absolute 08/31/2023 1.48 (L)     Immature Grans Absolute 08/31/2023 0.00     Lymphocytes Absolute 08/31/2023 1.09     Monocytes Absolute 08/31/2023 0.39     Eosinophils Absolute 08/31/2023 0.17     Basophils Absolute 08/31/2023 0.03     Hemoglobin A1C 08/31/2023 6.9 (H)     EAG 08/31/2023 151     Cholesterol 08/31/2023 196     Triglycerides 08/31/2023 91     HDL, Direct 08/31/2023 54     LDL Calculated 08/31/2023 124 (H)     Non-HDL-Chol (CHOL-HDL) 08/31/2023 142          Radiology Results:   No results found. Answers submitted by the patient for this visit:  Abdominal Pain Questionnaire (Submitted on 11/3/2023)  Chief Complaint: Abdominal pain  Chronicity: recurrent  Onset: more than 1 year ago  Onset quality: gradual  Frequency: every several days  Progression since onset: gradually worsening  Pain location: RLQ  Pain - numeric: 9/10  Pain quality: aching  Radiates to: epigastric region  anorexia: Yes  diarrhea: Yes  flatus: Yes  frequency: Yes  nausea:  Yes  Aggravated by: nothing  Relieved by: nothing, vomiting  Diagnostic workup: lower endoscopy

## 2023-11-06 ENCOUNTER — TELEPHONE (OUTPATIENT)
Dept: GASTROENTEROLOGY | Facility: CLINIC | Age: 78
End: 2023-11-06

## 2023-11-06 DIAGNOSIS — R10.84 GENERALIZED ABDOMINAL PAIN: Primary | ICD-10-CM

## 2023-11-06 NOTE — TELEPHONE ENCOUNTER
----- Message from [address] [place], [place] [address], PA-C sent at 11/6/2023  2:25 PM EST -----  Regarding: FW: Lab orders needed  Order placed. Please inform patient  ----- Message -----  From: Nelson Mejia  Sent: 11/6/2023   1:05 PM EST  To: [address] [place], [place] [address], PA-C; #  Subject: Lab orders needed                                This patient is having CT on Tuesday November 14th and will need to have a Bun and Creatinine done before the study.   Thank you

## 2023-11-06 NOTE — TELEPHONE ENCOUNTER
Called and spoke wit pt, relayed message. Informed pt she will need to fast for the lab. Pt voiced understanding.

## 2023-11-07 ENCOUNTER — APPOINTMENT (OUTPATIENT)
Age: 78
End: 2023-11-07
Payer: MEDICARE

## 2023-11-07 DIAGNOSIS — R10.84 GENERALIZED ABDOMINAL PAIN: ICD-10-CM

## 2023-11-07 DIAGNOSIS — E11.9 TYPE 2 DIABETES MELLITUS WITHOUT COMPLICATION, WITHOUT LONG-TERM CURRENT USE OF INSULIN (HCC): ICD-10-CM

## 2023-11-07 LAB
ALBUMIN SERPL BCP-MCNC: 3.8 G/DL (ref 3.5–5)
ALP SERPL-CCNC: 103 U/L (ref 34–104)
ALT SERPL W P-5'-P-CCNC: 14 U/L (ref 7–52)
ANION GAP SERPL CALCULATED.3IONS-SCNC: 7 MMOL/L
AST SERPL W P-5'-P-CCNC: 15 U/L (ref 13–39)
BASOPHILS # BLD AUTO: 0.03 THOUSANDS/ÂΜL (ref 0–0.1)
BASOPHILS NFR BLD AUTO: 1 % (ref 0–1)
BILIRUB SERPL-MCNC: 0.51 MG/DL (ref 0.2–1)
BUN SERPL-MCNC: 13 MG/DL (ref 5–25)
CALCIUM SERPL-MCNC: 9.7 MG/DL (ref 8.4–10.2)
CHLORIDE SERPL-SCNC: 104 MMOL/L (ref 96–108)
CHOLEST SERPL-MCNC: 199 MG/DL
CO2 SERPL-SCNC: 29 MMOL/L (ref 21–32)
CREAT SERPL-MCNC: 0.97 MG/DL (ref 0.6–1.3)
EOSINOPHIL # BLD AUTO: 0.15 THOUSAND/ÂΜL (ref 0–0.61)
EOSINOPHIL NFR BLD AUTO: 4 % (ref 0–6)
ERYTHROCYTE [DISTWIDTH] IN BLOOD BY AUTOMATED COUNT: 13.4 % (ref 11.6–15.1)
EST. AVERAGE GLUCOSE BLD GHB EST-MCNC: 143 MG/DL
GFR SERPL CREATININE-BSD FRML MDRD: 56 ML/MIN/1.73SQ M
GLUCOSE P FAST SERPL-MCNC: 109 MG/DL (ref 65–99)
HBA1C MFR BLD: 6.6 %
HCT VFR BLD AUTO: 39.2 % (ref 34.8–46.1)
HDLC SERPL-MCNC: 54 MG/DL
HGB BLD-MCNC: 12.9 G/DL (ref 11.5–15.4)
IMM GRANULOCYTES # BLD AUTO: 0.01 THOUSAND/UL (ref 0–0.2)
IMM GRANULOCYTES NFR BLD AUTO: 0 % (ref 0–2)
LDLC SERPL CALC-MCNC: 128 MG/DL (ref 0–100)
LYMPHOCYTES # BLD AUTO: 1.21 THOUSANDS/ÂΜL (ref 0.6–4.47)
LYMPHOCYTES NFR BLD AUTO: 34 % (ref 14–44)
MCH RBC QN AUTO: 29.5 PG (ref 26.8–34.3)
MCHC RBC AUTO-ENTMCNC: 32.9 G/DL (ref 31.4–37.4)
MCV RBC AUTO: 90 FL (ref 82–98)
MONOCYTES # BLD AUTO: 0.46 THOUSAND/ÂΜL (ref 0.17–1.22)
MONOCYTES NFR BLD AUTO: 13 % (ref 4–12)
NEUTROPHILS # BLD AUTO: 1.66 THOUSANDS/ÂΜL (ref 1.85–7.62)
NEUTS SEG NFR BLD AUTO: 48 % (ref 43–75)
NRBC BLD AUTO-RTO: 0 /100 WBCS
PLATELET # BLD AUTO: 250 THOUSANDS/UL (ref 149–390)
PMV BLD AUTO: 10.3 FL (ref 8.9–12.7)
POTASSIUM SERPL-SCNC: 4 MMOL/L (ref 3.5–5.3)
PROT SERPL-MCNC: 7.2 G/DL (ref 6.4–8.4)
RBC # BLD AUTO: 4.38 MILLION/UL (ref 3.81–5.12)
SODIUM SERPL-SCNC: 140 MMOL/L (ref 135–147)
TRIGL SERPL-MCNC: 84 MG/DL
WBC # BLD AUTO: 3.52 THOUSAND/UL (ref 4.31–10.16)

## 2023-11-07 PROCEDURE — 80061 LIPID PANEL: CPT

## 2023-11-07 PROCEDURE — 85025 COMPLETE CBC W/AUTO DIFF WBC: CPT

## 2023-11-07 PROCEDURE — 80053 COMPREHEN METABOLIC PANEL: CPT

## 2023-11-07 PROCEDURE — 36415 COLL VENOUS BLD VENIPUNCTURE: CPT

## 2023-11-07 PROCEDURE — 83036 HEMOGLOBIN GLYCOSYLATED A1C: CPT

## 2023-11-14 ENCOUNTER — HOSPITAL ENCOUNTER (OUTPATIENT)
Dept: CT IMAGING | Facility: HOSPITAL | Age: 78
Discharge: HOME | End: 2023-11-14
Payer: MEDICARE

## 2023-11-14 DIAGNOSIS — R10.84 GENERALIZED ABDOMINAL PAIN: ICD-10-CM

## 2023-11-14 PROCEDURE — G1004 CDSM NDSC: HCPCS

## 2023-11-14 PROCEDURE — 74177 CT ABD & PELVIS W/CONTRAST: CPT

## 2023-11-14 RX ADMIN — IOHEXOL 100 ML: 350 INJECTION, SOLUTION INTRAVENOUS at 08:37

## 2023-11-15 ENCOUNTER — TELEPHONE (OUTPATIENT)
Dept: GASTROENTEROLOGY | Facility: CLINIC | Age: 78
End: 2023-11-15

## 2023-11-15 NOTE — TELEPHONE ENCOUNTER
----- [facility] at 11/14/2023  2:48 PM EST -----  Please advise patient that this did not show anything acute or concerning

## 2023-11-21 ENCOUNTER — ANESTHESIA EVENT (OUTPATIENT)
Dept: GASTROENTEROLOGY | Facility: HOSPITAL | Age: 78
End: 2023-11-21

## 2023-11-21 ENCOUNTER — HOSPITAL ENCOUNTER (OUTPATIENT)
Dept: GASTROENTEROLOGY | Facility: HOSPITAL | Age: 78
Setting detail: OUTPATIENT SURGERY
Discharge: HOME | End: 2023-11-21
Payer: MEDICARE

## 2023-11-21 ENCOUNTER — ANESTHESIA (OUTPATIENT)
Dept: GASTROENTEROLOGY | Facility: HOSPITAL | Age: 78
End: 2023-11-21

## 2023-11-21 VITALS
TEMPERATURE: 97.4 F | OXYGEN SATURATION: 100 % | DIASTOLIC BLOOD PRESSURE: 83 MMHG | RESPIRATION RATE: 18 BRPM | WEIGHT: 175.93 LBS | SYSTOLIC BLOOD PRESSURE: 135 MMHG | BODY MASS INDEX: 28.27 KG/M2 | HEART RATE: 70 BPM | HEIGHT: 66 IN

## 2023-11-21 DIAGNOSIS — R10.84 GENERALIZED ABDOMINAL PAIN: ICD-10-CM

## 2023-11-21 LAB — GLUCOSE SERPL-MCNC: 111 MG/DL (ref 65–140)

## 2023-11-21 PROCEDURE — 43239 EGD BIOPSY SINGLE/MULTIPLE: CPT | Performed by: INTERNAL MEDICINE

## 2023-11-21 PROCEDURE — 88305 TISSUE EXAM BY PATHOLOGIST: CPT | Performed by: PATHOLOGY

## 2023-11-21 PROCEDURE — 82948 REAGENT STRIP/BLOOD GLUCOSE: CPT

## 2023-11-21 RX ORDER — PROPOFOL 10 MG/ML
INJECTION, EMULSION INTRAVENOUS AS NEEDED
Status: DISCONTINUED | OUTPATIENT
Start: 2023-11-21 | End: 2023-11-21

## 2023-11-21 RX ORDER — SODIUM CHLORIDE, SODIUM LACTATE, POTASSIUM CHLORIDE, CALCIUM CHLORIDE 600; 310; 30; 20 MG/100ML; MG/100ML; MG/100ML; MG/100ML
INJECTION, SOLUTION INTRAVENOUS CONTINUOUS PRN
Status: DISCONTINUED | OUTPATIENT
Start: 2023-11-21 | End: 2023-11-21

## 2023-11-21 RX ORDER — LIDOCAINE HYDROCHLORIDE 20 MG/ML
INJECTION, SOLUTION EPIDURAL; INFILTRATION; INTRACAUDAL; PERINEURAL AS NEEDED
Status: DISCONTINUED | OUTPATIENT
Start: 2023-11-21 | End: 2023-11-21

## 2023-11-21 RX ORDER — PANTOPRAZOLE SODIUM 40 MG/1
40 TABLET, DELAYED RELEASE ORAL DAILY
Qty: 30 TABLET | Refills: 2 | Status: SHIPPED | OUTPATIENT
Start: 2023-11-21

## 2023-11-21 RX ADMIN — LIDOCAINE HYDROCHLORIDE 100 MG: 20 INJECTION, SOLUTION EPIDURAL; INFILTRATION; INTRACAUDAL; PERINEURAL at 11:12

## 2023-11-21 RX ADMIN — PROPOFOL 20 MG: 10 INJECTION, EMULSION INTRAVENOUS at 11:13

## 2023-11-21 RX ADMIN — PROPOFOL 100 MG: 10 INJECTION, EMULSION INTRAVENOUS at 11:12

## 2023-11-21 RX ADMIN — PROPOFOL 20 MG: 10 INJECTION, EMULSION INTRAVENOUS at 11:17

## 2023-11-21 RX ADMIN — PROPOFOL 50 MG: 10 INJECTION, EMULSION INTRAVENOUS at 11:15

## 2023-11-21 RX ADMIN — SODIUM CHLORIDE, SODIUM LACTATE, POTASSIUM CHLORIDE, AND CALCIUM CHLORIDE: .6; .31; .03; .02 INJECTION, SOLUTION INTRAVENOUS at 11:08

## 2023-11-21 NOTE — INTERVAL H&P NOTE
H&P reviewed. After examining the patient I find no changes in the patients condition since the H&P had been written.     Vitals:    11/21/23 0952   BP: 135/72   Pulse: 72   Resp: 16   Temp: (!) 97.2 °F (36.2 °C)   SpO2: 95%

## 2023-11-21 NOTE — ANESTHESIA POSTPROCEDURE EVALUATION
Post-Op Assessment Note    CV Status:  Stable  Pain Score: 0    Pain management: adequate       Mental Status:  Alert and awake   Hydration Status:  Euvolemic   PONV Controlled:  Controlled   Airway Patency:  Patent  Two or more mitigation strategies used for obstructive sleep apnea   Post Op Vitals Reviewed: Yes    No anethesia notable event occurred.     Staff: TREY               /73 (11/21/23 1125)    Temp (!) 97.2 °F (36.2 °C) (11/21/23 1125)    Pulse 86 (11/21/23 1125)   Resp 18 (11/21/23 1125)    SpO2 98 % (11/21/23 1125)

## 2023-11-27 PROCEDURE — 88305 TISSUE EXAM BY PATHOLOGIST: CPT | Performed by: PATHOLOGY

## 2023-12-04 ENCOUNTER — NURSE TRIAGE (OUTPATIENT)
Age: 78
End: 2023-12-04

## 2023-12-04 NOTE — TELEPHONE ENCOUNTER
Reason for Disposition   Information only question and nurse able to answer    Answer Assessment - Initial Assessment Questions  1. REASON FOR CALL or QUESTION:     Pt. Calling for EGD results, reviewed results with pt., verbalized understanding, no further review needed    Protocols used:  Information Only Call - No Triage-ADULT-[place]

## 2023-12-05 ENCOUNTER — TELEPHONE (OUTPATIENT)
Dept: GASTROENTEROLOGY | Facility: CLINIC | Age: 78
End: 2023-12-05

## 2023-12-05 NOTE — TELEPHONE ENCOUNTER
----- Message from Genet Bhardwaj MD sent at 12/5/2023  7:02 AM EST -----  Please review with the patient  HI Hannyrod Murillo-     The biopsies of the small intestine and the stomach were normal.  This is good news. Have a good day.

## 2023-12-27 ENCOUNTER — RA CDI HCC (OUTPATIENT)
Dept: OTHER | Facility: HOSPITAL | Age: 78
End: 2023-12-27

## 2023-12-27 NOTE — PROGRESS NOTES
E11.22 FOR 2024  HCC coding opportunities          Chart Reviewed number of suggestions sent to Provider: 1     Patients Insurance     Medicare Insurance: Medicare

## 2024-01-02 ENCOUNTER — TELEPHONE (OUTPATIENT)
Dept: ADMINISTRATIVE | Facility: OTHER | Age: 79
End: 2024-01-02

## 2024-01-02 NOTE — TELEPHONE ENCOUNTER
01/02/24 4:23 PM    Patient contacted (left message) to bring Advance Directive, POLST, or Living Will document to next scheduled pcp visit.    Thank you.  Tiffany Quezada [facility]

## 2024-02-01 ENCOUNTER — OFFICE VISIT (OUTPATIENT)
Dept: INTERNAL MEDICINE CLINIC | Facility: CLINIC | Age: 79
End: 2024-02-01
Payer: MEDICARE

## 2024-02-01 ENCOUNTER — TELEPHONE (OUTPATIENT)
Dept: INTERNAL MEDICINE CLINIC | Facility: CLINIC | Age: 79
End: 2024-02-01

## 2024-02-01 VITALS
HEART RATE: 73 BPM | DIASTOLIC BLOOD PRESSURE: 72 MMHG | TEMPERATURE: 97.8 F | WEIGHT: 167.4 LBS | RESPIRATION RATE: 16 BRPM | BODY MASS INDEX: 26.9 KG/M2 | SYSTOLIC BLOOD PRESSURE: 118 MMHG | HEIGHT: 66 IN | OXYGEN SATURATION: 98 %

## 2024-02-01 DIAGNOSIS — R10.84 GENERALIZED ABDOMINAL PAIN: ICD-10-CM

## 2024-02-01 DIAGNOSIS — I10 HYPERTENSION, BENIGN: ICD-10-CM

## 2024-02-01 DIAGNOSIS — E11.9 TYPE 2 DIABETES MELLITUS WITHOUT COMPLICATION, WITHOUT LONG-TERM CURRENT USE OF INSULIN (HCC): Primary | ICD-10-CM

## 2024-02-01 DIAGNOSIS — E78.2 MIXED HYPERLIPIDEMIA: ICD-10-CM

## 2024-02-01 PROBLEM — R47.9 SPEECH DEFECT: Status: RESOLVED | Noted: 2019-01-16 | Resolved: 2024-02-01

## 2024-02-01 PROCEDURE — 99214 OFFICE O/P EST MOD 30 MIN: CPT | Performed by: INTERNAL MEDICINE

## 2024-02-01 RX ORDER — ROSUVASTATIN CALCIUM 5 MG/1
5 TABLET, COATED ORAL DAILY
Qty: 90 TABLET | Refills: 3 | Status: SHIPPED | OUTPATIENT
Start: 2024-02-01

## 2024-02-01 RX ORDER — PANTOPRAZOLE SODIUM 40 MG/1
40 TABLET, DELAYED RELEASE ORAL DAILY
Qty: 90 TABLET | Refills: 1 | Status: SHIPPED | OUTPATIENT
Start: 2024-02-01

## 2024-02-01 RX ORDER — PANTOPRAZOLE SODIUM 40 MG/1
40 TABLET, DELAYED RELEASE ORAL DAILY
Qty: 30 TABLET | Refills: 0 | Status: SHIPPED | OUTPATIENT
Start: 2024-02-01 | End: 2024-02-01 | Stop reason: SDUPTHER

## 2024-02-01 NOTE — TELEPHONE ENCOUNTER
"Message from pharmacy:    \"Chris Gonzales, this is Veronica calling from [facility]. This is [facility] here on [address] [facility] in [place] calling regarding a prescription that was electronically sent over to us and we've been sending it back for patient Radha Frank first name Radha, date of birth 11/28, 1945 patients insurance. This prescription was sent over to us for the pantoprazole generic protonix 40 milligrams pantoprazole sodium 40 milligrams for her to take one tablet by mouth daily. This prescription was sent over to us for a 30 day supply. Patients insurance is requesting it to be in 90 day supply prescription. They will not honor the 30 day supply because they're saying it is maintenance. So if a new prescription could be sent over to us or if someone could please give me a call back here at [facility], telephone number [phone], that's [phone] to have this prescription rectified. Thank you so very much. Have a good day. elgin Pepe.\"    "

## 2024-02-01 NOTE — PROGRESS NOTES
Assessment/Plan:     Chronic problems are improving.  Her sugars are improving.  Would continue her metformin 1000 mg twice a day.  We can recheck a more current A1c when she gets her cholesterol rechecked.  For her hyperlipidemia, we will switch her to Crestor 5 mg daily and recheck levels in 6 weeks on that change.  Her hypertension remains controlled with amlodipine 5 mg daily and lisinopril 10 mg daily.     Quality Measures:       Return in about 4 months (around 6/1/2024) for Recheck.    No problem-specific Assessment & Plan notes found for this encounter.       Diagnoses and all orders for this visit:    Type 2 diabetes mellitus without complication, without long-term current use of insulin (HCC)  -     Comprehensive metabolic panel; Future  -     Lipid panel; Future  -     Hemoglobin A1C; Future    Hypertension, benign    Mixed hyperlipidemia  -     rosuvastatin (CRESTOR) 5 mg tablet; Take 1 tablet (5 mg total) by mouth daily    Generalized abdominal pain  -     pantoprazole (PROTONIX) 40 mg tablet; Take 1 tablet (40 mg total) by mouth daily          Subjective:      Patient ID: Radha Frank is a 78 y.o. female.    Patient comes in today for routine follow-up with her .  She states she is doing okay.  Her blood pressure controlled.  Her sugars coming down.  Cholesterol still a little high.  She is still on Mevacor for a long time.  But after we spoke, she would be willing to try Crestor instead..  She is finished with the pantoprazole and was going to call GI.  But she notices the symptoms more without pantoprazole.        ALLERGIES:  Allergies   Allergen Reactions   • Nuts - Food Allergy Anaphylaxis     Raw Almonds only   • Codeine Other (See Comments)     Dizzy, lightheaded       CURRENT MEDICATIONS:    Current Outpatient Medications:   •  Accu-Chek Guide test strip, CHECK SUGAR DAILY, Disp: 100 strip, Rfl: 2  •  amLODIPine (NORVASC) 5 mg tablet, Take 1 tablet (5 mg total) by mouth daily, Disp: 90  tablet, Rfl: 3  •  dicyclomine (BENTYL) 10 mg capsule, Take 1 capsule (10 mg total) by mouth 3 (three) times a day as needed (abdominal pain), Disp: 60 capsule, Rfl: 2  •  hydrocortisone (ANUSOL-HC) 2.5 % rectal cream, Apply topically 2 (two) times a day, Disp: 28 g, Rfl: 3  •  levocetirizine (XYZAL) 5 MG tablet, Take 5 mg by mouth as needed , Disp: , Rfl:   •  lisinopril (ZESTRIL) 10 mg tablet, Take 1 tablet (10 mg total) by mouth daily, Disp: 90 tablet, Rfl: 3  •  metFORMIN (GLUCOPHAGE-XR) 500 mg 24 hr tablet, Take 2 tablets (1,000 mg total) by mouth 2 (two) times a day with meals, Disp: 360 tablet, Rfl: 1  •  pantoprazole (PROTONIX) 40 mg tablet, Take 1 tablet (40 mg total) by mouth daily, Disp: 30 tablet, Rfl: 0  •  rosuvastatin (CRESTOR) 5 mg tablet, Take 1 tablet (5 mg total) by mouth daily, Disp: 90 tablet, Rfl: 3    ACTIVE PROBLEM LIST:  Patient Active Problem List   Diagnosis   • Allergic rhinitis   • Mixed hyperlipidemia   • Hypertension, benign   • Irritable bowel syndrome without diarrhea   • Type 2 diabetes mellitus without complication, without long-term current use of insulin (HCC)   • Screening for malignant neoplasm of breast   • Abnormal mammogram   • Breast pain, right   • Paresthesia of both feet   • Bilateral cold feet   • Chronic bilateral low back pain   • Other headache syndrome   • History of colon polyps       PAST MEDICAL HISTORY:  Past Medical History:   Diagnosis Date   • Allergic    • Arthritis    • Diabetes mellitus (HCC)    • Diverticulitis of colon    • GERD (gastroesophageal reflux disease)    • Headache(784.0)    • HNP (herniated nucleus pulposus), lumbar    • Hyperlipidemia    • Hypertension    • IBS (irritable bowel syndrome)    • Osteoarthritis        PAST SURGICAL HISTORY:  Past Surgical History:   Procedure Laterality Date   • PARATHYROID GLAND SURGERY      resolved 05/12   • NM ESOPHAGOGASTRODUODENOSCOPY TRANSORAL DIAGNOSTIC N/A 4/19/2017    Procedure: EGD AND COLONOSCOPY;   Surgeon: Froylan Carbone III, MD;  Location: [facility];  Service: Gastroenterology   • UMBILICAL HERNIA REPAIR         FAMILY HISTORY:  Family History   Problem Relation Age of Onset   • Breast cancer Mother    • Heart defect Brother    • Arthritis Family    • Hypertension Family    • No Known Problems Father    • No Known Problems Brother        SOCIAL HISTORY:  Social History     Socioeconomic History   • Marital status: /Civil Union     Spouse name: Not on file   • Number of children: 2   • Years of education: Not on file   • Highest education level: Not on file   Occupational History   • Occupation: 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[employer]     Comment: retired   Tobacco Use   • Smoking status: Never   • Smokeless tobacco: Never   Vaping Use   • Vaping status: Never Used   Substance and Sexual Activity   • Alcohol use: No   • Drug use: No   • Sexual activity: Yes     Partners: Male   Other Topics Concern   • Not on file   Social History Narrative   • Not on file     Social Determinants of Health     Financial Resource Strain: Low Risk  (8/30/2023)    Overall Financial Resource Strain (CARDIA)    • Difficulty of Paying Living Expenses: Not hard at all   Food Insecurity: Not on file   Transportation Needs: No Transportation Needs (8/30/2023)    PRAPARE - Transportation    • Lack of Transportation (Medical): No    • Lack of Transportation (Non-Medical): No   Physical Activity: Not on file   Stress: Not on file   Social Connections: Not on file   Intimate Partner Violence: Not on file   Housing Stability: Not on file       Review of Systems   Respiratory:  Negative for shortness of breath.    Cardiovascular:  Negative for chest pain.   Gastrointestinal:  Positive for abdominal pain.         Objective:  Vitals:    02/01/24 1324   BP: 118/72   BP Location: Left arm   Patient Position: Sitting   Cuff Size: Adult   Pulse: 73   Resp: 16   Temp: 97.8 °F (36.6 °C)   TempSrc: Tympanic   SpO2: 98%   Weight: 75.9 kg (167 lb  "6.4 oz)   Height: 5' 6\" (1.676 m)     Body mass index is 27.02 kg/m².     Physical Exam  Vitals and nursing note reviewed.   Constitutional:       Appearance: Normal appearance. She is well-developed.   Cardiovascular:      Rate and Rhythm: Normal rate and regular rhythm.      Heart sounds: Normal heart sounds.   Pulmonary:      Effort: Pulmonary effort is normal.      Breath sounds: Normal breath sounds.   Abdominal:      Palpations: Abdomen is soft.      Tenderness: There is no abdominal tenderness.   Neurological:      Mental Status: She is alert and oriented to person, place, and time.   Psychiatric:         Mood and Affect: Mood normal.         Behavior: Behavior normal.           RESULTS:  Hemoglobin A1C   Date/Time Value Ref Range Status   11/07/2023 11:48 AM 6.6 (H) Normal 4.0-5.6%; PreDiabetic 5.7-6.4%; Diabetic >=6.5%; Glycemic control for adults with diabetes <7.0% % Final   07/09/2015 01:01 PM 5.8 4.5 - 6.2 % Final     Comment:     5.7-6.4% impaired fasting glucose  >=6.5% diagnosis of diabetes  Falsely low levels are seen in conditions linked to short RBC life span  ï¿½ hemolytic anemia, and splenomegaly  Falsely elevated levels are seen in situations where there is an  increased production of RBC ï¿½ receipt of erythropoietin or blood  transfusions  Adopted from ADA-Clinical Practice Recommendations       Cholesterol   Date/Time Value Ref Range Status   11/07/2023 11:48  See Comment mg/dL Final     Comment:     Cholesterol:         Pediatric <18 Years        Desirable          <170 mg/dL      Borderline High    170-199 mg/dL      High               >=200 mg/dL        Adult >=18 Years            Desirable        <200 mg/dL      Borderline High  200-239 mg/dL      High             >239 mg/dL       Triglycerides   Date/Time Value Ref Range Status   11/07/2023 11:48 AM 84 See Comment mg/dL Final     Comment:     Triglyceride:     0-9Y            <75mg/dL     10Y-17Y         <90 mg/dL       >=18Y     " Normal          <150 mg/dL     Borderline High 150-199 mg/dL     High            200-499 mg/dL        Very High       >499 mg/dL    Specimen collection should occur prior to Metamizole administration due to the potential for falsely depressed results.   04/06/2015 11:43 AM 87 mg/dL Final     Comment:     TRIGLYCERIDE:       Normal                 <150 mg/dl       Borderline High       150-199 mg/dl       High                  200-499 mg/dl       Very High             >499 mg/dl  _______________________________________       HDL   Date/Time Value Ref Range Status   04/06/2015 11:43 AM 60 mg/dL Final     Comment:     HDL:       High       >59 mg/dl       Low        <41 mg/dl  ______________________________       HDL, Direct   Date/Time Value Ref Range Status   11/07/2023 11:48 AM 54 >=50 mg/dL Final     LDL Calculated   Date/Time Value Ref Range Status   11/07/2023 11:48  (H) 0 - 100 mg/dL Final     Comment:     LDL Cholesterol:     Optimal           <100 mg/dl     Near Optimal      100-129 mg/dl     Above Optimal       Borderline High 130-159 mg/dl       High            160-189 mg/dl       Very High       >189 mg/dl         This screening LDL is a calculated result.   It does not have the accuracy of the Direct Measured LDL in the monitoring of patients with hyperlipidemia and/or statin therapy.   Direct Measure LDL (PKX691) must be ordered separately in these patients.   04/06/2015 11:43  (H) 0 - 100 mg/dL Final     Comment:     LDL, CALCULATED:     This screening LDL is a calculated result.  It does not have the accuracy of the Direct Measured LDL in the  monitoring of patients with hyperlipidemia and/or statin therapy.  Direct Measured LDL (Test Code 3126) must be ordered separately in these  patients.  ______________________________  The above 4 analytes were performed by [facility]  [address] [place] [address],[place] [address]       Hemoglobin   Date/Time Value Ref Range Status   11/07/2023 11:48 AM 12.9 11.5  - 15.4 g/dL Final   04/06/2015 11:43 AM 12.3 11.5 - 15.4 g/dL Final     Hematocrit   Date/Time Value Ref Range Status   11/07/2023 11:48 AM 39.2 34.8 - 46.1 % Final   04/06/2015 11:43 AM 37.7 34.8 - 46.1 % Final     Platelets   Date/Time Value Ref Range Status   11/07/2023 11:48  149 - 390 Thousands/uL Final   04/06/2015 11:43  149 - 390 Thousand/uL Final     TSH 3RD GENERATON   Date/Time Value Ref Range Status   05/16/2023 06:17 PM 2.408 0.450 - 4.500 uIU/mL Final     Comment:     The recommended reference ranges for TSH during pregnancy are as follows:   First trimester 0.1 to 2.5 uIU/mL   Second trimester  0.2 to 3.0 uIU/mL   Third trimester 0.3 to 3.0 uIU/m    Note: Normal ranges may not apply to patients who are transgender, non-binary, or whose legal sex, sex at birth, and gender identity differ.  Adult TSH (3rd generation) reference range follows the recommended guidelines of the American Thyroid Association, January, 2020.   11/21/2014 10:52 AM 1.633 0.550 - 4.780 uIU/ML Final     Comment:     *Patients undergoing fluorescein dye angiography may retain small  amounts of fluorescein in the body for 48-72 hours post procedure.  Samples containing fluorescein can produce falsely depressed TSH   values. If the patient had this procedure, a specimen should be  resubmitted post fluorescein clearance.  The recommended reference ranges for TSH during pregnancy are as  follows:  First trimester 0.1 to 2.5 uIU/mL  Second trimester  0.2 to 3.0 uIU/mL  Third trimester 0.3 to 3.0 uIU/mL  The above 1 analytes were performed by [address],[place] [address]       Free T4   Date/Time Value Ref Range Status   11/09/2020 01:53 PM 1.01 0.76 - 1.46 ng/dL Final     Comment:     Specimen collection should occur prior to Sulfasalazine administration due to the potential for falsely elevated results.     Sodium   Date/Time Value Ref Range Status   11/07/2023 11:48  135 - 147 mmol/L Final     BUN    Date/Time Value Ref Range Status   11/07/2023 11:48 AM 13 5 - 25 mg/dL Final   07/09/2015 01:01 PM 16 5 - 25 mg/dL Final     Creatinine   Date/Time Value Ref Range Status   11/07/2023 11:48 AM 0.97 0.60 - 1.30 mg/dL Final     Comment:     Standardized to IDMS reference method   07/09/2015 01:01 PM 0.9 0.6 - 1.0 mg/dL Final     Comment:     Standardized to IDMS reference method      In chart    This note was created with voice recognition software.  Phonic, grammatical and spelling errors may be present within the note as a result.

## 2024-02-21 PROBLEM — Z12.39 SCREENING FOR MALIGNANT NEOPLASM OF BREAST: Status: RESOLVED | Noted: 2018-05-03 | Resolved: 2024-02-21

## 2024-03-14 DIAGNOSIS — E11.9 TYPE 2 DIABETES MELLITUS WITHOUT COMPLICATION, WITHOUT LONG-TERM CURRENT USE OF INSULIN (HCC): ICD-10-CM

## 2024-03-14 RX ORDER — BLOOD SUGAR DIAGNOSTIC
STRIP MISCELLANEOUS
Qty: 100 STRIP | Refills: 2 | Status: SHIPPED | OUTPATIENT
Start: 2024-03-14

## 2024-03-15 DIAGNOSIS — R10.84 GENERALIZED ABDOMINAL PAIN: Primary | ICD-10-CM

## 2024-03-15 RX ORDER — CHLORDIAZEPOXIDE HYDROCHLORIDE AND CLIDINIUM BROMIDE 5; 2.5 MG/1; MG/1
1 CAPSULE ORAL
Qty: 60 CAPSULE | Refills: 3 | Status: SHIPPED | OUTPATIENT
Start: 2024-03-15

## 2024-04-05 ENCOUNTER — APPOINTMENT (OUTPATIENT)
Age: 79
End: 2024-04-05
Payer: MEDICARE

## 2024-04-05 DIAGNOSIS — B35.1 DERMATOPHYTOSIS OF NAIL: ICD-10-CM

## 2024-04-05 LAB
ALBUMIN SERPL BCP-MCNC: 3.7 G/DL (ref 3.5–5)
ALP SERPL-CCNC: 104 U/L (ref 34–104)
ALT SERPL W P-5'-P-CCNC: 11 U/L (ref 7–52)
AST SERPL W P-5'-P-CCNC: 16 U/L (ref 13–39)
BILIRUB DIRECT SERPL-MCNC: 0.04 MG/DL (ref 0–0.2)
BILIRUB SERPL-MCNC: 0.5 MG/DL (ref 0.2–1)
PROT SERPL-MCNC: 6.8 G/DL (ref 6.4–8.4)

## 2024-04-05 PROCEDURE — 80076 HEPATIC FUNCTION PANEL: CPT

## 2024-04-05 PROCEDURE — 36415 COLL VENOUS BLD VENIPUNCTURE: CPT

## 2024-04-10 DIAGNOSIS — I10 HYPERTENSION, BENIGN: ICD-10-CM

## 2024-04-10 RX ORDER — AMLODIPINE BESYLATE 5 MG/1
5 TABLET ORAL DAILY
Qty: 90 TABLET | Refills: 3 | Status: SHIPPED | OUTPATIENT
Start: 2024-04-10

## 2024-04-10 RX ORDER — LISINOPRIL 10 MG/1
10 TABLET ORAL DAILY
Qty: 90 TABLET | Refills: 3 | Status: SHIPPED | OUTPATIENT
Start: 2024-04-10

## 2024-04-17 DIAGNOSIS — R10.84 GENERALIZED ABDOMINAL PAIN: ICD-10-CM

## 2024-04-17 RX ORDER — CHLORDIAZEPOXIDE HYDROCHLORIDE AND CLIDINIUM BROMIDE 5; 2.5 MG/1; MG/1
1 CAPSULE ORAL
Qty: 360 CAPSULE | Refills: 1 | Status: SHIPPED | OUTPATIENT
Start: 2024-04-17

## 2024-05-06 DIAGNOSIS — E11.9 TYPE 2 DIABETES MELLITUS WITHOUT COMPLICATION, WITHOUT LONG-TERM CURRENT USE OF INSULIN (HCC): ICD-10-CM

## 2024-05-06 DIAGNOSIS — R10.84 GENERALIZED ABDOMINAL PAIN: ICD-10-CM

## 2024-05-06 RX ORDER — CHLORDIAZEPOXIDE HYDROCHLORIDE AND CLIDINIUM BROMIDE 5; 2.5 MG/1; MG/1
1 CAPSULE ORAL
Qty: 360 CAPSULE | Refills: 2 | Status: SHIPPED | OUTPATIENT
Start: 2024-05-06

## 2024-05-07 RX ORDER — METFORMIN HYDROCHLORIDE 500 MG/1
1000 TABLET, EXTENDED RELEASE ORAL 2 TIMES DAILY WITH MEALS
Qty: 360 TABLET | Refills: 1 | Status: SHIPPED | OUTPATIENT
Start: 2024-05-07

## 2024-05-17 ENCOUNTER — OFFICE VISIT (OUTPATIENT)
Dept: OBGYN CLINIC | Facility: CLINIC | Age: 79
End: 2024-05-17
Payer: MEDICARE

## 2024-05-17 VITALS
WEIGHT: 177.2 LBS | DIASTOLIC BLOOD PRESSURE: 79 MMHG | SYSTOLIC BLOOD PRESSURE: 163 MMHG | HEIGHT: 66 IN | BODY MASS INDEX: 28.48 KG/M2 | HEART RATE: 61 BPM

## 2024-05-17 DIAGNOSIS — M25.461 EFFUSION OF RIGHT KNEE: ICD-10-CM

## 2024-05-17 DIAGNOSIS — M17.11 PRIMARY OSTEOARTHRITIS OF RIGHT KNEE: ICD-10-CM

## 2024-05-17 DIAGNOSIS — M17.12 PRIMARY OSTEOARTHRITIS OF LEFT KNEE: Primary | ICD-10-CM

## 2024-05-17 PROCEDURE — 20610 DRAIN/INJ JOINT/BURSA W/O US: CPT | Performed by: ORTHOPAEDIC SURGERY

## 2024-05-17 PROCEDURE — 99214 OFFICE O/P EST MOD 30 MIN: CPT | Performed by: ORTHOPAEDIC SURGERY

## 2024-05-17 RX ORDER — LIDOCAINE HYDROCHLORIDE 10 MG/ML
2 INJECTION, SOLUTION INFILTRATION; PERINEURAL
Status: COMPLETED | OUTPATIENT
Start: 2024-05-17 | End: 2024-05-17

## 2024-05-17 RX ORDER — BUPIVACAINE HYDROCHLORIDE 2.5 MG/ML
2 INJECTION, SOLUTION INFILTRATION; PERINEURAL
Status: COMPLETED | OUTPATIENT
Start: 2024-05-17 | End: 2024-05-17

## 2024-05-17 RX ORDER — METHYLPREDNISOLONE ACETATE 40 MG/ML
2 INJECTION, SUSPENSION INTRA-ARTICULAR; INTRALESIONAL; INTRAMUSCULAR; SOFT TISSUE
Status: COMPLETED | OUTPATIENT
Start: 2024-05-17 | End: 2024-05-17

## 2024-05-17 RX ADMIN — BUPIVACAINE HYDROCHLORIDE 2 ML: 2.5 INJECTION, SOLUTION INFILTRATION; PERINEURAL at 13:00

## 2024-05-17 RX ADMIN — LIDOCAINE HYDROCHLORIDE 2 ML: 10 INJECTION, SOLUTION INFILTRATION; PERINEURAL at 13:00

## 2024-05-17 RX ADMIN — METHYLPREDNISOLONE ACETATE 2 ML: 40 INJECTION, SUSPENSION INTRA-ARTICULAR; INTRALESIONAL; INTRAMUSCULAR; SOFT TISSUE at 13:00

## 2024-05-17 NOTE — PROGRESS NOTES
Patient Name:  Radha Frank  MRN:  [id number]    Assessment & Plan     1. Primary osteoarthritis of left knee  -     Large joint arthrocentesis: L knee  2. Primary osteoarthritis of right knee  -     Large joint arthrocentesis: R knee  3. Effusion of right knee  -     Large joint arthrocentesis: R knee      Bilateral knee osteoarthritis, right knee effusion  Treatment options were discussed including right knee aspiration and corticosteroid injection  The patient was offered an aspiration for their right knee and corticosteroid injection for their bilateral knees knee. They tolerated the procedure well.    The patient was educated they may have some irritation in the next few days and should rest, ice, elevate and perform gentle range of motion exercises. They were advised the medicine should begin to work in a few days time.    They were informed their blood sugar levels can temporarily elevate and should reach out to their PCP if this becomes an issue.   The patient was informed corticosteroid injections can be repeated at the earliest every 3 months.  OTC analgesics as needed  Follow up as needed      History of the Present Illness   Radha Frank is a 78 y.o. female with Bilateral knee osteoarthritis, right>left. The patient was last seen in the office on 6/8/2023 when she was provided left knee CSI. Her pain today is rated 8/10. The injection provided about 4 months of relief. Pain is managed with Tylenol. The patient is apprehensive regarding more injections because she had pain with the last CSI. Her pain radiates down to her foot. She thinks her knee is swollen today.        Review of Systems     Review of Systems   Constitutional:  Negative for chills and fever.   HENT:  Negative for ear pain and sore throat.    Eyes:  Negative for pain and visual disturbance.   Respiratory:  Negative for cough and shortness of breath.    Cardiovascular:  Negative for chest pain and palpitations.   Gastrointestinal:   "Negative for abdominal pain and vomiting.   Genitourinary:  Negative for dysuria and hematuria.   Musculoskeletal:  Negative for arthralgias and back pain.   Skin:  Negative for color change and rash.   Neurological:  Negative for seizures and syncope.   All other systems reviewed and are negative.      Physical Exam     /79   Pulse 61   Ht 5' 6\" (1.676 m)   Wt 80.4 kg (177 lb 3.2 oz)   BMI 28.60 kg/m²     Right Knee  Range of motion from 0 to 105 with pain.    There is small effusion.    There is mild tenderness over the medial joint line.    The patient is able to perform a straight leg raise with 4+/5 strength.    Varus stress testing reveals no instability at 0 and 30 degrees   Valgus stress testing reveals no instability at 0 and 30 degrees  The patient is neurovascular intact distally.    Left  Knee  Range of motion from 0 to 115.    There is no effusion.    There is no tenderness over the knee.    The patient is able to perform a straight leg raise with 5/5 strength.    Varus stress testing reveals no instability at 0 and 30 degrees   Valgus stress testing reveals no instability at 0 and 30 degrees  The patient is neurovascular intact distally.  Data Review     I have personally reviewed pertinent films in PACS, and my interpretation follows.    X-rays taken 06/08/2023 of Left knee demonstrates mild medial and lateral compartment degenerative changes and moderate to severe patellofemoral degenerative changes and joint space narrowing. No acute fracture or dislocation.    Social History     Tobacco Use   • Smoking status: Never   • Smokeless tobacco: Never   Vaping Use   • Vaping status: Never Used   Substance Use Topics   • Alcohol use: No   • Drug use: No           Large joint arthrocentesis: R knee  Universal Protocol:  Consent: Verbal consent obtained.  Risks and benefits: risks, benefits and alternatives were discussed  Consent given by: patient  Time out: Immediately prior to procedure a \"time " "out\" was called to verify the correct patient, procedure, equipment, support staff and site/side marked as required.  Patient understanding: patient states understanding of the procedure being performed  Site marked: the operative site was marked  Patient identity confirmed: verbally with patient  Supporting Documentation  Indications: pain   Procedure Details  Location: knee - R knee  Preparation: Patient was prepped and draped in the usual sterile fashion  Needle size: 22 G  Ultrasound guidance: no  Approach: anterolateral  Medications administered: 2 mL bupivacaine 0.25 %; 2 mL methylPREDNISolone acetate 40 mg/mL; 2 mL lidocaine 1 %    Aspirate amount: 45 mL  Aspirate: clear, serous and yellow  Patient tolerance: patient tolerated the procedure well with no immediate complications  Dressing:  Sterile dressing applied      Large joint arthrocentesis: L knee  Universal Protocol:  Consent: Verbal consent obtained.  Risks and benefits: risks, benefits and alternatives were discussed  Consent given by: patient  Time out: Immediately prior to procedure a \"time out\" was called to verify the correct patient, procedure, equipment, support staff and site/side marked as required.  Patient understanding: patient states understanding of the procedure being performed  Site marked: the operative site was marked  Patient identity confirmed: verbally with patient  Supporting Documentation  Indications: pain   Procedure Details  Location: knee - L knee  Preparation: Patient was prepped and draped in the usual sterile fashion  Needle size: 22 G  Ultrasound guidance: no  Approach: anterolateral  Medications administered: 2 mL bupivacaine 0.25 %; 2 mL methylPREDNISolone acetate 40 mg/mL; 2 mL lidocaine 1 %    Patient tolerance: patient tolerated the procedure well with no immediate complications  Dressing:  Sterile dressing applied            Evelyn Hester   Scribe Attestation    I,:  Evelyn Hester am acting as a scribe while in " the presence of the attending physician.:       I,:  Logan Delvalle, DO personally performed the services described in this documentation    as scribed in my presence.:

## 2024-06-03 DIAGNOSIS — E78.2 MIXED HYPERLIPIDEMIA: ICD-10-CM

## 2024-06-03 DIAGNOSIS — R10.84 GENERALIZED ABDOMINAL PAIN: ICD-10-CM

## 2024-06-03 RX ORDER — PANTOPRAZOLE SODIUM 40 MG/1
40 TABLET, DELAYED RELEASE ORAL DAILY
Qty: 90 TABLET | Refills: 1 | Status: SHIPPED | OUTPATIENT
Start: 2024-06-03

## 2024-06-03 RX ORDER — ROSUVASTATIN CALCIUM 5 MG/1
5 TABLET, COATED ORAL DAILY
Qty: 90 TABLET | Refills: 3 | Status: SHIPPED | OUTPATIENT
Start: 2024-06-03

## 2024-06-03 NOTE — TELEPHONE ENCOUNTER
Medication: pantoprazole (PROTONIX) 40 mg tablet     Dose/Frequency: Take 1 tablet (40 mg total) by mouth daily     Quantity: 90 tablet     Pharmacy:  [facility]/pharmacy #[id number] - [facility] [place] - [address]     Office:   [x] PCP/Provider -   [] Speciality/Provider -     Does the patient have enough for 3 days?   [x] Yes   [] No - Send as HP to POD

## 2024-06-03 NOTE — TELEPHONE ENCOUNTER
Patient called to refill the     rosuvastatin (CRESTOR) 5 mg tablet   To [facility]/pharmacy #[id number] - [place], [place] - [address]  Thank you.

## 2024-06-06 ENCOUNTER — RA CDI HCC (OUTPATIENT)
Dept: OTHER | Facility: HOSPITAL | Age: 79
End: 2024-06-06

## 2024-06-12 ENCOUNTER — APPOINTMENT (OUTPATIENT)
Age: 79
End: 2024-06-12
Payer: MEDICARE

## 2024-06-12 ENCOUNTER — TELEPHONE (OUTPATIENT)
Dept: ADMINISTRATIVE | Facility: OTHER | Age: 79
End: 2024-06-12

## 2024-06-12 DIAGNOSIS — E11.9 TYPE 2 DIABETES MELLITUS WITHOUT COMPLICATION, WITHOUT LONG-TERM CURRENT USE OF INSULIN (HCC): ICD-10-CM

## 2024-06-12 LAB
ALBUMIN SERPL BCP-MCNC: 4.1 G/DL (ref 3.5–5)
ALP SERPL-CCNC: 125 U/L (ref 34–104)
ALT SERPL W P-5'-P-CCNC: 12 U/L (ref 7–52)
ANION GAP SERPL CALCULATED.3IONS-SCNC: 6 MMOL/L (ref 4–13)
AST SERPL W P-5'-P-CCNC: 14 U/L (ref 13–39)
BILIRUB SERPL-MCNC: 0.35 MG/DL (ref 0.2–1)
BUN SERPL-MCNC: 23 MG/DL (ref 5–25)
CALCIUM SERPL-MCNC: 10.1 MG/DL (ref 8.4–10.2)
CHLORIDE SERPL-SCNC: 105 MMOL/L (ref 96–108)
CHOLEST SERPL-MCNC: 261 MG/DL
CO2 SERPL-SCNC: 29 MMOL/L (ref 21–32)
CREAT SERPL-MCNC: 1.06 MG/DL (ref 0.6–1.3)
EST. AVERAGE GLUCOSE BLD GHB EST-MCNC: 163 MG/DL
GFR SERPL CREATININE-BSD FRML MDRD: 50 ML/MIN/1.73SQ M
GLUCOSE P FAST SERPL-MCNC: 108 MG/DL (ref 65–99)
HBA1C MFR BLD: 7.3 %
HDLC SERPL-MCNC: 73 MG/DL
LDLC SERPL CALC-MCNC: 149 MG/DL (ref 0–100)
NONHDLC SERPL-MCNC: 188 MG/DL
POTASSIUM SERPL-SCNC: 4.3 MMOL/L (ref 3.5–5.3)
PROT SERPL-MCNC: 7.5 G/DL (ref 6.4–8.4)
SODIUM SERPL-SCNC: 140 MMOL/L (ref 135–147)
TRIGL SERPL-MCNC: 195 MG/DL

## 2024-06-12 PROCEDURE — 83036 HEMOGLOBIN GLYCOSYLATED A1C: CPT

## 2024-06-12 PROCEDURE — 36415 COLL VENOUS BLD VENIPUNCTURE: CPT

## 2024-06-12 PROCEDURE — 80061 LIPID PANEL: CPT

## 2024-06-12 PROCEDURE — 80053 COMPREHEN METABOLIC PANEL: CPT

## 2024-06-12 NOTE — TELEPHONE ENCOUNTER
06/12/24 10:06 AM    Patient contacted to bring Advance Directive, POLST, or Living Will document to next scheduled pcp visit.[facility] Department spoke with patient.    Thank you.  Yady Alvarez  [facility]

## 2024-06-13 ENCOUNTER — OFFICE VISIT (OUTPATIENT)
Dept: INTERNAL MEDICINE CLINIC | Facility: CLINIC | Age: 79
End: 2024-06-13
Payer: MEDICARE

## 2024-06-13 VITALS
HEART RATE: 72 BPM | BODY MASS INDEX: 29.09 KG/M2 | OXYGEN SATURATION: 98 % | SYSTOLIC BLOOD PRESSURE: 134 MMHG | DIASTOLIC BLOOD PRESSURE: 82 MMHG | TEMPERATURE: 97.4 F | RESPIRATION RATE: 17 BRPM | WEIGHT: 181 LBS | HEIGHT: 66 IN

## 2024-06-13 DIAGNOSIS — I10 HYPERTENSION, BENIGN: ICD-10-CM

## 2024-06-13 DIAGNOSIS — R21 RASH: ICD-10-CM

## 2024-06-13 DIAGNOSIS — E11.9 TYPE 2 DIABETES MELLITUS WITHOUT COMPLICATION, WITHOUT LONG-TERM CURRENT USE OF INSULIN (HCC): ICD-10-CM

## 2024-06-13 DIAGNOSIS — E78.2 MIXED HYPERLIPIDEMIA: Primary | ICD-10-CM

## 2024-06-13 PROCEDURE — G2211 COMPLEX E/M VISIT ADD ON: HCPCS | Performed by: INTERNAL MEDICINE

## 2024-06-13 PROCEDURE — 99214 OFFICE O/P EST MOD 30 MIN: CPT | Performed by: INTERNAL MEDICINE

## 2024-06-13 RX ORDER — CLOTRIMAZOLE AND BETAMETHASONE DIPROPIONATE 10; .64 MG/G; MG/G
CREAM TOPICAL 2 TIMES DAILY
Qty: 45 G | Refills: 1 | Status: SHIPPED | OUTPATIENT
Start: 2024-06-13

## 2024-06-13 RX ORDER — ROSUVASTATIN CALCIUM 5 MG/1
5 TABLET, COATED ORAL DAILY
Qty: 90 TABLET | Refills: 3 | Status: SHIPPED | OUTPATIENT
Start: 2024-06-13

## 2024-06-13 RX ORDER — METFORMIN HYDROCHLORIDE 500 MG/1
1000 TABLET, EXTENDED RELEASE ORAL 2 TIMES DAILY WITH MEALS
Qty: 360 TABLET | Refills: 3 | Status: SHIPPED | OUTPATIENT
Start: 2024-06-13

## 2024-06-13 NOTE — PROGRESS NOTES
Patient's shoes and socks removed.    Right Foot/Ankle   Right Foot Inspection  Skin Exam: dry skin.     Toe Exam: ROM and strength within normal limits.     Sensory   Vibration: intact  Proprioception: intact  Monofilament testing: intact    Vascular  Capillary refills: < 3 seconds  The right DP pulse is 2+. The right PT pulse is 2+.     Left Foot/Ankle  Left Foot Inspection  Skin Exam: dry skin.     Toe Exam: swelling.     Sensory   Vibration: intact  Monofilament testing: intact    Vascular  Capillary refills: < 3 seconds  The left DP pulse is 2+. The left PT pulse is 2+.     Assign Risk Category  No deformity present  No loss of protective sensation  No weak pulses  Risk: 0

## 2024-06-13 NOTE — ASSESSMENT & PLAN NOTE
Worsening control of her cholesterol is all from her medicine running out.  Refilled that and she will be back on track

## 2024-06-13 NOTE — ASSESSMENT & PLAN NOTE
Again, worsening control because she was unable to get her medicine.  Lab Results   Component Value Date    HGBA1C 7.3 (H) 06/12/2024

## 2024-06-13 NOTE — PROGRESS NOTES
Ambulatory Visit  Name: Radha Frank      : 1945      MRN: [id number]  Encounter Provider: Jason Christiansno MD  Encounter Date: 2024   Encounter department: [facility] [place]    Assessment & Plan   1. Mixed hyperlipidemia  Assessment & Plan:  Worsening control of her cholesterol is all from her medicine running out.  Refilled that and she will be back on track  Orders:  -     rosuvastatin (CRESTOR) 5 mg tablet; Take 1 tablet (5 mg total) by mouth daily  2. Type 2 diabetes mellitus without complication, without long-term current use of insulin (HCC)  Assessment & Plan:  Again, worsening control because she was unable to get her medicine.  Lab Results   Component Value Date    HGBA1C 7.3 (H) 2024     Orders:  -     Albumin / creatinine urine ratio; Future  -     metFORMIN (GLUCOPHAGE-XR) 500 mg 24 hr tablet; Take 2 tablets (1,000 mg total) by mouth 2 (two) times a day with meals  -     Hemoglobin A1C; Future; Expected date: 2024  -     Comprehensive metabolic panel; Future; Expected date: 2024  -     Lipid Panel with Direct LDL reflex; Future; Expected date: 2024  -     CBC and differential; Future; Expected date: 2024  3. Hypertension, benign  Assessment & Plan:  Controlled.  Continue amlodipine 5 mg daily and lisinopril 10 mg daily.  4. Rash  -     clotrimazole-betamethasone (LOTRISONE) 1-0.05 % cream; Apply topically 2 (two) times a day       History of Present Illness     Patient comes in today for routine follow-up with her .  She was all upset because her blood work showed her sugar and cholesterol were elevated.  But there was an issue with her insurance so she did not have her medicine for at least a month.  Apparently her insurance or the pharmacy had her birth date off by 1 day and that was showing she was ineligible for her 's insurance.  That has since been corrected but she has not taken the medicine.  Her blood pressure is  "controlled.  Denies any new complaints today.  Just worried about her  and his illnesses.  No further additions to her history.        Review of Systems   Respiratory:  Negative for shortness of breath.    Cardiovascular:  Negative for chest pain.   Gastrointestinal:  Negative for abdominal pain.       Objective     /82 (BP Location: Left arm, Patient Position: Sitting, Cuff Size: Large)   Pulse 72   Temp (!) 97.4 °F (36.3 °C) (Tympanic)   Resp 17   Ht 5' 6\" (1.676 m)   Wt 82.1 kg (181 lb)   SpO2 98%   BMI 29.21 kg/m²     Physical Exam  Vitals and nursing note reviewed.   Constitutional:       Appearance: Normal appearance. She is well-developed.   Cardiovascular:      Rate and Rhythm: Normal rate and regular rhythm.      Heart sounds: Normal heart sounds.   Pulmonary:      Effort: Pulmonary effort is normal.      Breath sounds: Normal breath sounds.   Abdominal:      Palpations: Abdomen is soft.      Tenderness: There is no abdominal tenderness.   Neurological:      Mental Status: She is alert and oriented to person, place, and time.   Psychiatric:         Mood and Affect: Mood normal.         Behavior: Behavior normal.       Administrative Statements     "

## 2024-07-26 ENCOUNTER — TELEPHONE (OUTPATIENT)
Dept: PAIN MEDICINE | Facility: CLINIC | Age: 79
End: 2024-07-26

## 2024-07-29 ENCOUNTER — OFFICE VISIT (OUTPATIENT)
Dept: PAIN MEDICINE | Facility: CLINIC | Age: 79
End: 2024-07-29
Payer: MEDICARE

## 2024-07-29 VITALS
WEIGHT: 181 LBS | BODY MASS INDEX: 29.09 KG/M2 | DIASTOLIC BLOOD PRESSURE: 76 MMHG | SYSTOLIC BLOOD PRESSURE: 132 MMHG | HEIGHT: 66 IN | HEART RATE: 62 BPM

## 2024-07-29 DIAGNOSIS — M47.816 LUMBAR SPONDYLOSIS: ICD-10-CM

## 2024-07-29 DIAGNOSIS — M54.42 CHRONIC BILATERAL LOW BACK PAIN WITH LEFT-SIDED SCIATICA: ICD-10-CM

## 2024-07-29 DIAGNOSIS — M54.16 LUMBAR RADICULOPATHY: Primary | ICD-10-CM

## 2024-07-29 DIAGNOSIS — G89.29 CHRONIC BILATERAL LOW BACK PAIN WITH LEFT-SIDED SCIATICA: ICD-10-CM

## 2024-07-29 PROCEDURE — 99204 OFFICE O/P NEW MOD 45 MIN: CPT | Performed by: STUDENT IN AN ORGANIZED HEALTH CARE EDUCATION/TRAINING PROGRAM

## 2024-07-29 RX ORDER — MELOXICAM 15 MG/1
15 TABLET ORAL DAILY PRN
Qty: 15 TABLET | Refills: 0 | Status: SHIPPED | OUTPATIENT
Start: 2024-07-29

## 2024-07-29 NOTE — PATIENT INSTRUCTIONS
"Patient Education     Epidural injection   The Basics   Written by the doctors and editors at [facility]   What is an epidural injection? -- An epidural injection can be used to treat a condition called \"radiculopathy.\" This is the medical term for the pain, weakness, numbness, or tingling that happens when nerves coming from the spinal cord get pinched or damaged.  The doctor injects medicines into the space outside the covering of the spinal cord (figure 1). This is similar to an \"epidural\" that is used for pain relief during labor and childbirth.  Epidural injections can be given into different parts of your back:   Cervical epidural injection - Used to help with pain in the head or arms.   Thoracic epidural injection - Used to help with pain in the upper or middle back.   Lumbar epidural injection - Used to help with pain in the lower back or legs.  How do I prepare for an epidural injection? -- The doctor or nurse will tell you if you need to do anything special to prepare. Before your procedure, your doctor will do an exam. They might send you to get tests, such as:   X-ray, ultrasound, or other imaging tests - Imaging tests create pictures of the inside of the body.  Your doctor will also ask you about your \"health history.\" This involves asking you questions about any health problems you have or had in the past, past surgeries, and any medicines you take. Tell them about:   Any medicines you are taking - This includes any prescription or \"over-the-counter\" medicines you use, plus any herbal supplements you take. It helps to write down and bring a list of any medicines you take, or bring a bag with all of your medicines with you.   Any allergies you have   Any bleeding problems you have - Certain medicines, including some herbs and supplements, can increase the risk of bleeding. Some health conditions also increase this risk.  You will also get information about:   Eating and drinking before your procedure - In " "some cases, you might need to \"fast\" before surgery. This means not eating or drinking anything for a period of time. In other cases, you might be allowed to have liquids until a short time before the procedure. Whether you need to fast, and for how long, depends on the procedure you are having.   What help you will need when you go home - For example, you might need to have someone else bring you home or stay with you for some time while you recover.  Ask the doctor or nurse if you have questions or if there is anything you do not understand.  What happens during an epidural injection? -- When it is time for the procedure:   You might get an \"IV,\" which is a thin tube that goes into a vein. This can be used to give you fluids and medicines.   You will get anesthesia medicines to numb the area where the doctor will give the injection. This is to make sure that you do not feel pain during the procedure. You might also get medicines to make you relax and feel sleepy, called \"sedatives.\"   The doctors and nurses will monitor your breathing, blood pressure, and heart rate during the procedure.   The doctor might use a continuous X-ray called \"fluoroscopy.\" This is to help make sure that the medicines are injected into the right place. The doctor might also inject a dye to see where to give the medicine.   The doctor will place a needle through your skin and inject the medicine into a space near your spine. Then, they will remove the needle and cover the area with a clean bandage.   The procedure takes 15 to 30 minutes.  What happens after an epidural injection? -- After your procedure, the staff will watch you closely for a short time. It might take a few days before you feel the effects of the epidural injection.  Before you go home, make sure that you know what problems to look out for and when to call the doctor. Make sure that you understand your doctor's or nurse's instructions. Ask questions about anything you do " "not understand.  For the rest of the day after your procedure:   Try to rest. Limit activities like exercise or driving.   The doctor might recommend an over-the-counter pain medicine. These include acetaminophen (sample brand name: Tylenol), ibuprofen (sample brand names: Advil, Motrin), and naproxen (sample brand name: Aleve).   Ice can help with pain and swelling. Put a cold gel pack, bag of ice, or bag of frozen vegetables on the injection site area every 1 to 2 hours, for 15 minutes each time. Put a thin towel between the ice (or other cold object) and the skin.  What are the risks of an epidural injection? -- Your doctor will talk to you about all of the possible risks, and answer your questions. Possible risks include:   Bleeding   Infection   Headache   Nerve injury  When should I call the doctor? -- Call for emergency help right away (in the [place] and [place], call 9-1-1) if:   You can't move your arms or legs.  Call for advice if:   You have a fever of 100.4°F (38°C) or higher, or chills.   You have redness or swelling around the injection site.   You have a headache.   Your arms or legs are numb, weak, or tingly.  All topics are updated as new evidence becomes available and our peer review process is complete.  This topic retrieved from [employer] on: May 15, 2024.  Topic 987826 Version 1.0  Release: 32.4.3 - C32.134  © 2024 UpToDate, Inc. and/or its affiliates. All rights reserved.  figure 1: Epidural injection     Duringan epidural injection, the doctor inserts a needle between 2 of the bones thatmake up the spine. Then, they inject medicines into the area around the spinalcord. This is an illustration of a \"lumbar\" epidural injection, which is given into the low back. The doctor can place the needle in other areas to treat other types of pain.  Graphic 392044 Version 1.0  Consumer Information Use and Disclaimer   Disclaimer: This generalized information is a limited summary of diagnosis, treatment, and/or " medication information. It is not meant to be comprehensive and should be used as a tool to help the user understand and/or assess potential diagnostic and treatment options. It does NOT include all information about conditions, treatments, medications, side effects, or risks that may apply to a specific patient. It is not intended to be medical advice or a substitute for the medical advice, diagnosis, or treatment of a health care provider based on the health care provider's examination and assessment of a patient's specific and unique circumstances. Patients must speak with a health care provider for complete information about their health, medical questions, and treatment options, including any risks or benefits regarding use of medications. This information does not endorse any treatments or medications as safe, effective, or approved for treating a specific patient. UpToDate, Inc. and its affiliates disclaim any warranty or liability relating to this information or the use thereof.The use of this information is governed by the Terms of Use, available at https://www.Dfmeibao.[employer]Roll.com/en/know/clinical-effectiveness-terms. 2024© UpToDate, Inc. and its affiliates and/or licensors. All rights reserved.  Copyright   © 2024 UpToDate, Inc. and/or its affiliates. All rights reserved.

## 2024-07-29 NOTE — PROGRESS NOTES
Assessment:  1. Lumbar radiculopathy    2. Lumbar spondylosis    3. Chronic bilateral low back pain with left-sided sciatica        Plan:  Orders Placed This Encounter   Procedures    MRI lumbar spine wo contrast     Standing Status:   Future     Standing Expiration Date:   7/29/2028     Scheduling Instructions:      There is no preparation for this test. Please leave your jewelry and valuables at home, wedding rings are the exception. Magnetic nail polish must be removed prior to arrival for your test. Please bring your insurance cards, a form of photo ID and a list of your medications with you. Arrive 15       minutes prior to your appointment time in order to register. Please bring any prior CT or MRI studies of this area that were not performed at a [facility].            To schedule this appointment, please contact Central Scheduling at [phone].            Prior to your appointment, please make sure you complete the MRI Screening Form when you e-Check in for your appointment. This will be available starting 7 days before your appointment in [employer]. You may receive an e-mail with an activation code if you do not have a [employer] account. If you do not       have access to a device, we will complete your screening at your appointment.     Order Specific Question:   What is the patient's sedation requirement?     Answer:   No Sedation     Order Specific Question:   Does this procedure require the 3T MRI?     Answer:   No     Order Specific Question:   Release to patient through [employer]     Answer:   Immediate     Order Specific Question:   Is order priority selected as STAT?     Answer:   No     Order Specific Question:   Reason for Exam     Answer:   lumbar radiculopathy       New Medications Ordered This Visit   Medications    meloxicam (MOBIC) 15 mg tablet     Sig: Take 1 tablet (15 mg total) by mouth daily as needed for moderate pain USE THIS MEDICATION SPARINGLY.     Dispense:  15 tablet      Refill:  0       My impressions and treatment recommendations were discussed in detail with the patient, who verbalized understanding and had no further questions.    78-year-old female with advanced facet arthropathy presents her office with chief complaint of bilateral low back pain, left greater than right with radiation down the left lower extremity what appears to be an L5 dermatome.  She has x-ray from 2018 with advanced lumbar degenerative disease and has considerable axial component to her pain as well.    Given focal pattern to her symptoms, I will order MRI of the lumbar spine to assess for any significant compressive pathology.  This will help to determine next course of action which may include epidural steroid injection which was discussed with the patient.  At that juncture may consider also sending patient to aquatic therapy.    She will be prescribed meloxicam 15 mg daily as needed.  Patient does notably have chronic kidney disease.  Therefore I instructed the patient to take this medication very sparingly.  She demonstrates understanding.    [place] Prescription Drug Monitoring Program report was reviewed and was appropriate     Complete risks and benefits including bleeding, infection, tissue reaction, nerve injury and allergic reaction were discussed. The approach was demonstrated using models and literature was provided. Verbal and written consent was obtained.    \Discharge instructions were provided. I personally saw and examined the patient and I agree with the above discussed plan of care.    History of Present Illness:    Radha Frank is a 78 y.o. female who presents to [facility] for initial evaluation of the above stated pain complaints. The patient has a past medical and chronic pain history as outlined in the assessment section. She was referred by Referral Self  No address on file .    B/L lower back pain. Left side pain runs down the back of the leg  running down to the foot. Right side pain runs on the front of the leg to the knee.  Chronic issue for 2 years.  Moderate to severe in intensity over the past month.  Nearly constant.  Worse at night.  Pressure-like in nature.    She reports chronic back issues but over the last year, patient has began to notice pain in the left lower back and buttock region with radiation down the left lower extremity into the left lateral calf. Denies dragging her foot when she walks. oN The right she has pain to the knee.     Increased with standing, walking.  No change with relaxation, coughing, sneezing.    Has not smoked tobacco or marijuana.  Not allergic to latex or contrast dye.  In the past has used codeine and hydrocodone.  Currently using acetaminophen.      Review of Systems:    Review of Systems   Endocrine: Positive for polyuria.           Past Medical History:   Diagnosis Date    Allergic     Arthritis     Diabetes mellitus (HCC)     Diverticulitis of colon     GERD (gastroesophageal reflux disease)     Headache(784.0)     HNP (herniated nucleus pulposus), lumbar     Hyperlipidemia     Hypertension     IBS (irritable bowel syndrome)     Osteoarthritis        Past Surgical History:   Procedure Laterality Date    PARATHYROID GLAND SURGERY      resolved 05/12    AR ESOPHAGOGASTRODUODENOSCOPY TRANSORAL DIAGNOSTIC N/A 04/19/2017    Procedure: EGD AND COLONOSCOPY;  Surgeon: Froylan Carbone III, MD;  Location: [facility];  Service: Gastroenterology    THYROID SURGERY      UMBILICAL HERNIA REPAIR         Family History   Problem Relation Age of Onset    Breast cancer Mother     Diabetes Mother     Heart defect Brother     Arthritis Family     Hypertension Family     No Known Problems Father     No Known Problems Brother        Social History     Occupational History    Occupation: 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[employer]     Comment: retired   Tobacco Use    Smoking status: Never    Smokeless tobacco: Never   Vaping Use    Vaping  "status: Never Used   Substance and Sexual Activity    Alcohol use: No    Drug use: No    Sexual activity: Yes     Partners: Male     Birth control/protection: None         Current Outpatient Medications:     Accu-Chek Guide test strip, CHECK SUGAR DAILY, Disp: 100 strip, Rfl: 2    amLODIPine (NORVASC) 5 mg tablet, TAKE 1 TABLET (5 MG TOTAL) BY MOUTH DAILY., Disp: 90 tablet, Rfl: 3    chlordiazepoxide-clidinium (LIBRAX) 5-2.5 mg per capsule, TAKE 1 CAPSULE BY MOUTH 4 (FOUR) TIMES A DAY (BEFORE MEALS AND AT BEDTIME), Disp: 360 capsule, Rfl: 2    clotrimazole-betamethasone (LOTRISONE) 1-0.05 % cream, Apply topically 2 (two) times a day, Disp: 45 g, Rfl: 1    hydrocortisone (ANUSOL-HC) 2.5 % rectal cream, Apply topically 2 (two) times a day, Disp: 28 g, Rfl: 3    levocetirizine (XYZAL) 5 MG tablet, Take 5 mg by mouth as needed , Disp: , Rfl:     lisinopril (ZESTRIL) 10 mg tablet, TAKE 1 TABLET BY MOUTH EVERY DAY, Disp: 90 tablet, Rfl: 3    meloxicam (MOBIC) 15 mg tablet, Take 1 tablet (15 mg total) by mouth daily as needed for moderate pain USE THIS MEDICATION SPARINGLY., Disp: 15 tablet, Rfl: 0    metFORMIN (GLUCOPHAGE-XR) 500 mg 24 hr tablet, Take 2 tablets (1,000 mg total) by mouth 2 (two) times a day with meals, Disp: 360 tablet, Rfl: 3    pantoprazole (PROTONIX) 40 mg tablet, Take 1 tablet (40 mg total) by mouth daily, Disp: 90 tablet, Rfl: 1    rosuvastatin (CRESTOR) 5 mg tablet, Take 1 tablet (5 mg total) by mouth daily, Disp: 90 tablet, Rfl: 3    dicyclomine (BENTYL) 10 mg capsule, Take 1 capsule (10 mg total) by mouth 3 (three) times a day as needed (abdominal pain), Disp: 60 capsule, Rfl: 2    Allergies   Allergen Reactions    Nuts - Food Allergy Anaphylaxis     Raw Almonds only    Codeine Other (See Comments)     Dizzy, lightheaded       Physical Exam:    /76   Pulse 62   Ht 5' 6\" (1.676 m)   Wt 82.1 kg (181 lb)   BMI 29.21 kg/m²     Constitutional: normal, well developed, well nourished, alert, in " no distress and non-toxic and no overt pain behavior.  Eyes: anicteric  HEENT: grossly intact  Neck: supple, symmetric, trachea midline and no masses   Pulmonary:even and unlabored  Cardiovascular:No edema or pitting edema present  Skin:Normal without rashes or lesions and well hydrated  Psychiatric:Mood and affect appropriate  Neurologic:Cranial Nerves II-XII grossly intact  Musculoskeletal: mild edema of left foot noted    Lumbar Spine Exam    Appearance:  Normal lordosis  Palpation/Tenderness:  no tenderness or spasm  Sensory:  no sensory deficits noted  Range of Motion:  Limited extension 2/2 pain  Motor Strength:  Left hip flexion:  5/5  Left hip extension:  5/5  Right hip flexion:  5/5  Right hip extension:  5/5  Left knee flexion:  5/5  Left knee extension:  5/5  Right knee flexion:  5/5  Right knee extension:  5/5  Left foot dorsiflexion:  5/5  Left foot plantar flexion:  5/5  Right foot dorsiflexion:  5/5  Right foot plantar flexion:  5/5  Reflexes:  Left Patellar:  2+   Right Patellar:  2+   Left Achilles:  2+   Right Achilles:  2+   Special Tests:  Left Straight Leg Test:  negative  Right Straight Leg Test:  negative          Imaging    LUMBAR SPINE  6/14/18     INDICATION:   R20.2: Paresthesia of skin  R20.9: Unspecified disturbances of skin sensation  M54.5: Low back pain  G89.29: Other chronic pain.  Bilateral lower extremity paresthesias.     COMPARISON:  Lumbar spine MRI performed September 24, 2012.     VIEWS:  XR SPINE LUMBAR MINIMUM 4 VIEWS NON INJURY        FINDINGS:     There is no evidence of acute fracture or destructive osseous lesion.  Left convex curvature of lower thoracic and upper lumbar spine, apex at L1.  Minimal grade 1 anterolisthesis of L3 relative to L4 and L4 relative to L5.  No compression deformity.  Mild L5-S1 disc space narrowing with fusion across the disc space as a result of bulky ventral marginal osteophyte formation.  Advanced facet   spondylosis from L3-L4 through  L5-S1.        Atherosclerotic vascular calcifications are noted.  Visualized soft tissues otherwise appear unremarkable.     IMPRESSION:     No acute osseous abnormality.       Degenerative changes as described.    MRI lumbar spine wo contrast    (Results Pending)       Orders Placed This Encounter   Procedures    MRI lumbar spine wo contrast

## 2024-08-08 ENCOUNTER — HOSPITAL ENCOUNTER (OUTPATIENT)
Dept: MRI IMAGING | Facility: CLINIC | Age: 79
Discharge: HOME | End: 2024-08-08
Payer: MEDICARE

## 2024-08-08 DIAGNOSIS — M54.16 LUMBAR RADICULOPATHY: ICD-10-CM

## 2024-08-08 DIAGNOSIS — M54.42 CHRONIC BILATERAL LOW BACK PAIN WITH LEFT-SIDED SCIATICA: ICD-10-CM

## 2024-08-08 DIAGNOSIS — G89.29 CHRONIC BILATERAL LOW BACK PAIN WITH LEFT-SIDED SCIATICA: ICD-10-CM

## 2024-08-08 PROCEDURE — 72148 MRI LUMBAR SPINE W/O DYE: CPT

## 2024-08-13 ENCOUNTER — TELEPHONE (OUTPATIENT)
Dept: RADIOLOGY | Facility: CLINIC | Age: 79
End: 2024-08-13

## 2024-08-13 NOTE — TELEPHONE ENCOUNTER
S/W pt and advised of results and plan  Pt states pain on left leg is outside to foot  Pain on right leg is front to knee but she also has a baker's cyst that is being treated.    Pt is interested in LESI but would maybe like to try AT? Pt's  is going to [facility] in [facility] for same  She would like to also schedule the LESI in case AT is not helpful    Please advise

## 2024-08-13 NOTE — TELEPHONE ENCOUNTER
----- Message from Jose Alejandro Bermeo MD sent at 8/13/2024  9:34 AM EDT -----  Patient has worsening degnerative changes compared to MRI in 2012. This is causing severe narrowing of the tunnel where the nerves are traveling, or spinal stenosis. This explains the pain going into the legs.     She is candidate for epidural injection, but would like to confirm where most of the pain is as she was saying it was mostly left leg but she had some issues on the right.

## 2024-08-15 DIAGNOSIS — M48.062 SPINAL STENOSIS OF LUMBAR REGION WITH NEUROGENIC CLAUDICATION: Primary | ICD-10-CM

## 2024-08-15 NOTE — TELEPHONE ENCOUNTER
S/W pt and advised the same  Faxed AT order to [facility] [phone]  Advised can schedule LESI if she wishes or wait to see how AT goes  CB with questions or concerns

## 2024-08-20 ENCOUNTER — TELEPHONE (OUTPATIENT)
Dept: RADIOLOGY | Facility: CLINIC | Age: 79
End: 2024-08-20

## 2024-08-20 NOTE — TELEPHONE ENCOUNTER
Scheduled lm on 8/15/24 for pt - rcb to schedule LESI with Dr Bermeo.    Second message left for pt on 8/20/24 - asked for rcb if pt would like to schedule

## 2024-08-21 ENCOUNTER — PATIENT MESSAGE (OUTPATIENT)
Dept: RADIOLOGY | Facility: CLINIC | Age: 79
End: 2024-08-21

## 2024-08-21 NOTE — TELEPHONE ENCOUNTER
Caller: rey Garcia     Doctor: Elvie     Reason for call: pt returning  call     Call back#: [phone]

## 2024-08-21 NOTE — PATIENT COMMUNICATION
Pt is scheduled for LESI with Dr Bermeo on 9/12/24    Pt is diabetic but does not wear a glucose monitor    Pt denies taking prescription blood thinners or aspirin    Pt given instructions over phone and via myc message    Have you completed PT/HEP/Chiro in the past 6 months for dedicated area? PT order given by Dr Bermeo on 8/15/24  If yes, how long did you complete?  What was the frequency?  Did it provide relief? TBD  If no, reason therapy was not completed?

## 2024-08-26 DIAGNOSIS — K58.9 IRRITABLE BOWEL SYNDROME WITHOUT DIARRHEA: ICD-10-CM

## 2024-08-26 DIAGNOSIS — Z86.010 HISTORY OF COLON POLYPS: ICD-10-CM

## 2024-08-26 RX ORDER — DICYCLOMINE HYDROCHLORIDE 10 MG/1
CAPSULE ORAL
Qty: 270 CAPSULE | Refills: 1 | Status: SHIPPED | OUTPATIENT
Start: 2024-08-26

## 2024-09-09 ENCOUNTER — OFFICE VISIT (OUTPATIENT)
Dept: OBGYN CLINIC | Facility: CLINIC | Age: 79
End: 2024-09-09
Payer: MEDICARE

## 2024-09-09 VITALS
WEIGHT: 180.2 LBS | BODY MASS INDEX: 28.96 KG/M2 | HEART RATE: 53 BPM | SYSTOLIC BLOOD PRESSURE: 168 MMHG | DIASTOLIC BLOOD PRESSURE: 91 MMHG | HEIGHT: 66 IN

## 2024-09-09 DIAGNOSIS — M17.12 PRIMARY OSTEOARTHRITIS OF LEFT KNEE: Primary | ICD-10-CM

## 2024-09-09 DIAGNOSIS — M25.561 RIGHT KNEE PAIN, UNSPECIFIED CHRONICITY: ICD-10-CM

## 2024-09-09 DIAGNOSIS — M25.562 LEFT KNEE PAIN, UNSPECIFIED CHRONICITY: ICD-10-CM

## 2024-09-09 DIAGNOSIS — M25.461 EFFUSION OF RIGHT KNEE: ICD-10-CM

## 2024-09-09 DIAGNOSIS — M17.11 PRIMARY OSTEOARTHRITIS OF RIGHT KNEE: ICD-10-CM

## 2024-09-09 PROCEDURE — 99214 OFFICE O/P EST MOD 30 MIN: CPT | Performed by: ORTHOPAEDIC SURGERY

## 2024-09-09 PROCEDURE — 20610 DRAIN/INJ JOINT/BURSA W/O US: CPT | Performed by: ORTHOPAEDIC SURGERY

## 2024-09-09 RX ORDER — BUPIVACAINE HYDROCHLORIDE 2.5 MG/ML
2 INJECTION, SOLUTION INFILTRATION; PERINEURAL
Status: COMPLETED | OUTPATIENT
Start: 2024-09-09 | End: 2024-09-09

## 2024-09-09 RX ORDER — METHYLPREDNISOLONE ACETATE 40 MG/ML
2 INJECTION, SUSPENSION INTRA-ARTICULAR; INTRALESIONAL; INTRAMUSCULAR; SOFT TISSUE
Status: COMPLETED | OUTPATIENT
Start: 2024-09-09 | End: 2024-09-09

## 2024-09-09 RX ORDER — LIDOCAINE HYDROCHLORIDE 10 MG/ML
2 INJECTION, SOLUTION INFILTRATION; PERINEURAL
Status: COMPLETED | OUTPATIENT
Start: 2024-09-09 | End: 2024-09-09

## 2024-09-09 RX ADMIN — BUPIVACAINE HYDROCHLORIDE 2 ML: 2.5 INJECTION, SOLUTION INFILTRATION; PERINEURAL at 11:45

## 2024-09-09 RX ADMIN — METHYLPREDNISOLONE ACETATE 2 ML: 40 INJECTION, SUSPENSION INTRA-ARTICULAR; INTRALESIONAL; INTRAMUSCULAR; SOFT TISSUE at 11:45

## 2024-09-09 RX ADMIN — LIDOCAINE HYDROCHLORIDE 2 ML: 10 INJECTION, SOLUTION INFILTRATION; PERINEURAL at 11:45

## 2024-09-09 NOTE — PROGRESS NOTES
Patient Name:  Radha Frank  MRN:  [id number]    Assessment & Plan     1. Primary osteoarthritis of left knee  -     Large joint arthrocentesis: L knee  2. Right knee pain, unspecified chronicity  -     Large joint arthrocentesis: R knee  3. Primary osteoarthritis of right knee  -     Large joint arthrocentesis: R knee  4. Left knee pain, unspecified chronicity  -     Large joint arthrocentesis: L knee  5. Effusion of right knee  -     Large joint arthrocentesis: R knee      Bilateral knee Osteoarthritis with right knee effusion.   X-rays reviewed in the office today  Nonoperative treatment options were discussed in the form of oral analgesics, activity modification, injection therapy, formal physical therapy.  Surgical intervention was briefly discussed in the form of total knee arthroplasty.  Patient was interested in repeat injections.  Risks and benefits were discussed in detail.  Right knee was aspirated prior to injection.  Patient tolerated the procedures well.    Patient will monitor chronic unchanged anterior left knee mass. We will consider further diagnostic imaging if there are any changes.     Follow up PRN        History of the Present Illness   Radha Frank is a 78 y.o. female with Bilateral knee Osteoarthritis. She has been treating conservatively with intermittent cortisone injections and aspirations when needed. At her last visit on 5/17/2024 she was given BL knee CSI along with 45 ML of fluid aspirated from her right knee. She feels the relief she had from her injections started to wear off around mid August. Patient feels her right knee bothers her more than her left again today and is requesting to repeat BL knee CSI's. Patient does note a mobile mass along the distal aspect of her left quadricept tendon for the past 10 years that comes and goes in size. Patient was asking if this will ever go away in office today. She deneies any associated pain or recent change in mass.   She has a scheduled  "Left L5-S! LESI with Dr. Bermeo on 9/12/2024 Miranda bearden make sure further  Patient is a diabetic 7.3 A1C last checked on 6/12/2024        Review of Systems     Review of Systems   Constitutional:  Negative for chills and fever.   HENT:  Negative for ear pain and sore throat.    Eyes:  Negative for pain and visual disturbance.   Respiratory:  Negative for cough and shortness of breath.    Cardiovascular:  Negative for chest pain and palpitations.   Gastrointestinal:  Negative for abdominal pain and vomiting.   Genitourinary:  Negative for dysuria and hematuria.   Musculoskeletal:  Positive for arthralgias. Negative for back pain.   Skin:  Negative for color change and rash.   Neurological:  Negative for seizures and syncope.   All other systems reviewed and are negative.      Physical Exam     /91   Pulse (!) 53   Ht 5' 6\" (1.676 m)   Wt 81.7 kg (180 lb 3.2 oz)   BMI 29.09 kg/m²     Left  Knee  Range of motion from 0 to 120.    There is no effusion.    There is no tenderness over the medial and lateral joint line.    Mobile 2 x 2 x 2 cm, soft, non tender, superficial over central to medial quadricept tendon   The patient is neurovascular intact distally.    Right knee  Mild effusion  Range of motion from 3 to 95.    Tender over medial joint line   The patient is neurovascular intact distally.        Data Review     I have personally reviewed pertinent films in PACS, and my interpretation follows.    X-rays taken 06/08/2023 of Left knee demonstrates mild medial and lateral compartment degenerative changes and moderate to severe patellofemoral degenerative changes and joint space narrowing. No acute fracture or dislocation.    Social History     Tobacco Use    Smoking status: Never    Smokeless tobacco: Never   Vaping Use    Vaping status: Never Used   Substance Use Topics    Alcohol use: No    Drug use: No           Large joint arthrocentesis: R knee  Universal Protocol:  Consent: Verbal consent " "obtained.  Risks and benefits: risks, benefits and alternatives were discussed  Consent given by: patient  Time out: Immediately prior to procedure a \"time out\" was called to verify the correct patient, procedure, equipment, support staff and site/side marked as required.  Patient understanding: patient states understanding of the procedure being performed  Site marked: the operative site was marked  Supporting Documentation  Indications: pain   Procedure Details  Location: knee - R knee  Preparation: Patient was prepped and draped in the usual sterile fashion  Needle size: 22 G  Ultrasound guidance: no  Approach: anterolateral  Medications administered: 2 mL bupivacaine 0.25 %; 2 mL lidocaine 1 %; 2 mL methylPREDNISolone acetate 40 mg/mL    Aspirate amount: 35 mL  Aspirate: clear and yellow  Patient tolerance: patient tolerated the procedure well with no immediate complications  Dressing:  Sterile dressing applied      Large joint arthrocentesis: L knee  Universal Protocol:  Consent: Verbal consent obtained.  Risks and benefits: risks, benefits and alternatives were discussed  Consent given by: patient  Time out: Immediately prior to procedure a \"time out\" was called to verify the correct patient, procedure, equipment, support staff and site/side marked as required.  Patient understanding: patient states understanding of the procedure being performed  Site marked: the operative site was marked  Supporting Documentation  Indications: pain   Procedure Details  Location: knee - L knee  Preparation: Patient was prepped and draped in the usual sterile fashion  Needle size: 22 G  Ultrasound guidance: no  Approach: anterolateral  Medications administered: 2 mL bupivacaine 0.25 %; 2 mL lidocaine 1 %; 2 mL methylPREDNISolone acetate 40 mg/mL    Patient tolerance: patient tolerated the procedure well with no immediate complications  Dressing:  Sterile dressing applied        Fernandaibtor Attestation      I,:  Mica Menendez " am acting as a scribe while in the presence of the attending physician.:       I,:  Logan Delvalle, DO personally performed the services described in this documentation    as scribed in my presence.:              Logan Delvalle, DO

## 2024-09-12 ENCOUNTER — HOSPITAL ENCOUNTER (OUTPATIENT)
Dept: RADIOLOGY | Facility: CLINIC | Age: 79
Discharge: HOME | End: 2024-09-12
Payer: MEDICARE

## 2024-09-12 VITALS
RESPIRATION RATE: 20 BRPM | SYSTOLIC BLOOD PRESSURE: 147 MMHG | DIASTOLIC BLOOD PRESSURE: 80 MMHG | OXYGEN SATURATION: 95 % | HEART RATE: 55 BPM

## 2024-09-12 DIAGNOSIS — M48.062 SPINAL STENOSIS OF LUMBAR REGION WITH NEUROGENIC CLAUDICATION: ICD-10-CM

## 2024-09-12 PROCEDURE — 62323 NJX INTERLAMINAR LMBR/SAC: CPT | Performed by: STUDENT IN AN ORGANIZED HEALTH CARE EDUCATION/TRAINING PROGRAM

## 2024-09-12 RX ORDER — METHYLPREDNISOLONE ACETATE 80 MG/ML
80 INJECTION, SUSPENSION INTRA-ARTICULAR; INTRALESIONAL; INTRAMUSCULAR; PARENTERAL; SOFT TISSUE ONCE
Status: COMPLETED | OUTPATIENT
Start: 2024-09-12 | End: 2024-09-12

## 2024-09-12 RX ADMIN — IOHEXOL 1 ML: 300 INJECTION, SOLUTION INTRAVENOUS at 09:48

## 2024-09-12 RX ADMIN — METHYLPREDNISOLONE ACETATE 80 MG: 80 INJECTION, SUSPENSION INTRA-ARTICULAR; INTRALESIONAL; INTRAMUSCULAR; PARENTERAL; SOFT TISSUE at 09:48

## 2024-09-12 NOTE — H&P
History of Present Illness: The patient is a 78 y.o. female who presents with complaints of bck and buttock pain    Past Medical History:   Diagnosis Date    Allergic     Arthritis     Diabetes mellitus (HCC)     Diverticulitis of colon     GERD (gastroesophageal reflux disease)     Headache(784.0)     HNP (herniated nucleus pulposus), lumbar     Hyperlipidemia     Hypertension     IBS (irritable bowel syndrome)     Osteoarthritis        Past Surgical History:   Procedure Laterality Date    PARATHYROID GLAND SURGERY      resolved 05/12    OK ESOPHAGOGASTRODUODENOSCOPY TRANSORAL DIAGNOSTIC N/A 04/19/2017    Procedure: EGD AND COLONOSCOPY;  Surgeon: Froylan Carbone III, MD;  Location: [facility];  Service: Gastroenterology    THYROID SURGERY      UMBILICAL HERNIA REPAIR           Current Outpatient Medications:     Accu-Chek Guide test strip, CHECK SUGAR DAILY, Disp: 100 strip, Rfl: 2    amLODIPine (NORVASC) 5 mg tablet, TAKE 1 TABLET (5 MG TOTAL) BY MOUTH DAILY., Disp: 90 tablet, Rfl: 3    chlordiazepoxide-clidinium (LIBRAX) 5-2.5 mg per capsule, TAKE 1 CAPSULE BY MOUTH 4 (FOUR) TIMES A DAY (BEFORE MEALS AND AT BEDTIME), Disp: 360 capsule, Rfl: 2    clotrimazole-betamethasone (LOTRISONE) 1-0.05 % cream, Apply topically 2 (two) times a day, Disp: 45 g, Rfl: 1    dicyclomine (BENTYL) 10 mg capsule, TAKE 1 CAPSULE (10 MG TOTAL) BY MOUTH 3 TIMES A DAY AS NEEDED FOR ABDOMINAL PAIN, Disp: 270 capsule, Rfl: 1    hydrocortisone (ANUSOL-HC) 2.5 % rectal cream, Apply topically 2 (two) times a day, Disp: 28 g, Rfl: 3    levocetirizine (XYZAL) 5 MG tablet, Take 5 mg by mouth as needed , Disp: , Rfl:     lisinopril (ZESTRIL) 10 mg tablet, TAKE 1 TABLET BY MOUTH EVERY DAY, Disp: 90 tablet, Rfl: 3    meloxicam (MOBIC) 15 mg tablet, Take 1 tablet (15 mg total) by mouth daily as needed for moderate pain USE THIS MEDICATION SPARINGLY., Disp: 15 tablet, Rfl: 0    metFORMIN (GLUCOPHAGE-XR) 500 mg 24 hr tablet, Take 2 tablets (1,000 mg  total) by mouth 2 (two) times a day with meals, Disp: 360 tablet, Rfl: 3    pantoprazole (PROTONIX) 40 mg tablet, Take 1 tablet (40 mg total) by mouth daily, Disp: 90 tablet, Rfl: 1    rosuvastatin (CRESTOR) 5 mg tablet, Take 1 tablet (5 mg total) by mouth daily, Disp: 90 tablet, Rfl: 3    Allergies   Allergen Reactions    Nuts - Food Allergy Anaphylaxis     Raw Almonds only    Codeine Other (See Comments)     Dizzy, lightheaded       Physical Exam:   Vitals:    09/12/24 0930   BP: 152/84   Pulse: 56   Resp: 20   SpO2: 98%     General: Awake, Alert, Oriented x 3, Mood and affect appropriate  Respiratory: Respirations even and unlabored  Cardiovascular: Peripheral pulses intact; no edema  Musculoskeletal Exam: back non erythematous no lesions    ASA Score: 3    Patient/Chart Verification  Patient ID Verified: Verbal  ID Band Applied: No  Consents Confirmed: To be obtained in the Pre-Procedure area  H&P( within 30 days) Verified: To be obtained in the Procedural area  Interval H&P(within 24 hr) Complete (required for Outpatients and Surgery Admit only): To be obtained in the Procedural area  Allergies Reviewed: Yes  Anticoag/NSAID held?: NA  Currently on antibiotics?: No  Pregnancy denied?: NA    Assessment:   1. Spinal stenosis of lumbar region with neurogenic claudication        Plan: left L5-S1 LESI

## 2024-09-12 NOTE — DISCHARGE INSTR - LAB
Epidural Steroid Injection   WHAT YOU NEED TO KNOW:   An epidural steroid injection (DANA) is a procedure to inject steroid medicine into the epidural space. The epidural space is between your spinal cord and vertebrae. Steroids reduce inflammation and fluid buildup in your spine that may be causing pain. You may be given pain medicine along with the steroids.          ACTIVITY  Do not drive or operate machinery today.  No strenuous activity today - bending, lifting, etc.  You may resume normal activites starting tomorrow - start slowly and as tolerated.  You may shower today, but no tub baths or hot tubs.  You may have numbness for several hours from the local anesthetic. Please use caution and common sense, especially with weight-bearing activities.    CARE OF THE INJECTION SITE  If you have soreness or pain, apply ice to the area today (20 minutes on/20 minutes off).  Starting tomorrow, you may use warm, moist heat or ice if needed.  You may have an increase or change in your discomfort for 36-48 hours after your treatment.  Apply ice and continue with any pain medication you have been prescribed.  Notify the [facility] if you have any of the following: redness, drainage, swelling, headache, stiff neck or fever above 100°F.    SPECIAL INSTRUCTIONS  Our office will contact you in approximately 14 days for a progress report.    MEDICATIONS  Continue to take all routine medications.  Our office may have instructed you to hold some medications.    As no general anesthesia was used in today's procedure, you should not experience any side effects related to anesthesia.     If you are diabetic, the steroids used in today's injection may temporarily increase your blood sugar levels after the first few days after your injection. Please keep a close eye on your sugars and alert the doctor who manages your diabetes if your sugars are significantly high from your baseline or you are symptomatic.     If you have a  problem specifically related to your procedure, please call our office at [phone].  Problems not related to your procedure should be directed to your primary care physician.

## 2024-09-24 DIAGNOSIS — R10.84 GENERALIZED ABDOMINAL PAIN: ICD-10-CM

## 2024-09-24 RX ORDER — CHLORDIAZEPOXIDE HYDROCHLORIDE AND CLIDINIUM BROMIDE 5; 2.5 MG/1; MG/1
1 CAPSULE ORAL
Qty: 360 CAPSULE | Refills: 0 | Status: SHIPPED | OUTPATIENT
Start: 2024-09-24

## 2024-09-24 NOTE — TELEPHONE ENCOUNTER
Reason for call:   [x] Refill   [] Prior Auth  [] Other:     Office:   [] PCP/Provider -   [x] Specialty/Provider - GASTRO SPCL[facility] MANDEEP Dueñas PA-C     Medication: chlordiazepoxide-clidinium (LIBRAX) 5-2.5 mg per capsule    Dose/Frequency: TAKE 1 CAPSULE BY MOUTH 4 (FOUR) TIMES A DAY (BEFORE MEALS AND AT BEDTIME)     Quantity: 360 capsule     Pharmacy: [facility]/pharm[facility] #[id number] - [facility] [place] - [address] [place] [phone]    Does the patient have enough for 3 days?   [] Yes   [x] No - Send as HP to POD

## 2024-10-18 ENCOUNTER — TELEPHONE (OUTPATIENT)
Age: 79
End: 2024-10-18

## 2024-10-18 NOTE — TELEPHONE ENCOUNTER
Caller: Mónica [facility]     Doctor: Elvie     Reason for call: Mónica from [facility] calling asking if plan of care was received she states was sent numerous times please advise     Call back#: [phone]

## 2024-10-21 NOTE — TELEPHONE ENCOUNTER
Lm for [facility] asking to refax the plan of care information, provided fax number for office

## 2024-10-25 ENCOUNTER — APPOINTMENT (OUTPATIENT)
Age: 79
End: 2024-10-25
Payer: MEDICARE

## 2024-10-25 DIAGNOSIS — E11.9 TYPE 2 DIABETES MELLITUS WITHOUT COMPLICATION, WITHOUT LONG-TERM CURRENT USE OF INSULIN (HCC): ICD-10-CM

## 2024-10-25 LAB
ALBUMIN SERPL BCG-MCNC: 3.7 G/DL (ref 3.5–5)
ALP SERPL-CCNC: 99 U/L (ref 34–104)
ALT SERPL W P-5'-P-CCNC: 10 U/L (ref 7–52)
ANION GAP SERPL CALCULATED.3IONS-SCNC: 9 MMOL/L (ref 4–13)
AST SERPL W P-5'-P-CCNC: 14 U/L (ref 13–39)
BASOPHILS # BLD AUTO: 0.04 THOUSANDS/ΜL (ref 0–0.1)
BASOPHILS NFR BLD AUTO: 1 % (ref 0–1)
BILIRUB SERPL-MCNC: 0.47 MG/DL (ref 0.2–1)
BUN SERPL-MCNC: 16 MG/DL (ref 5–25)
CALCIUM SERPL-MCNC: 9.6 MG/DL (ref 8.4–10.2)
CHLORIDE SERPL-SCNC: 105 MMOL/L (ref 96–108)
CHOLEST SERPL-MCNC: 218 MG/DL
CO2 SERPL-SCNC: 27 MMOL/L (ref 21–32)
CREAT SERPL-MCNC: 0.87 MG/DL (ref 0.6–1.3)
CREAT UR-MCNC: 125.1 MG/DL
EOSINOPHIL # BLD AUTO: 0.08 THOUSAND/ΜL (ref 0–0.61)
EOSINOPHIL NFR BLD AUTO: 2 % (ref 0–6)
ERYTHROCYTE [DISTWIDTH] IN BLOOD BY AUTOMATED COUNT: 14.2 % (ref 11.6–15.1)
EST. AVERAGE GLUCOSE BLD GHB EST-MCNC: 169 MG/DL
GFR SERPL CREATININE-BSD FRML MDRD: 64 ML/MIN/1.73SQ M
GLUCOSE P FAST SERPL-MCNC: 167 MG/DL (ref 65–99)
HBA1C MFR BLD: 7.5 %
HCT VFR BLD AUTO: 41.5 % (ref 34.8–46.1)
HDLC SERPL-MCNC: 52 MG/DL
HGB BLD-MCNC: 13 G/DL (ref 11.5–15.4)
IMM GRANULOCYTES # BLD AUTO: 0.01 THOUSAND/UL (ref 0–0.2)
IMM GRANULOCYTES NFR BLD AUTO: 0 % (ref 0–2)
LDLC SERPL CALC-MCNC: 143 MG/DL (ref 0–100)
LYMPHOCYTES # BLD AUTO: 1.72 THOUSANDS/ΜL (ref 0.6–4.47)
LYMPHOCYTES NFR BLD AUTO: 33 % (ref 14–44)
MCH RBC QN AUTO: 29.5 PG (ref 26.8–34.3)
MCHC RBC AUTO-ENTMCNC: 31.3 G/DL (ref 31.4–37.4)
MCV RBC AUTO: 94 FL (ref 82–98)
MICROALBUMIN UR-MCNC: 15 MG/L
MICROALBUMIN/CREAT 24H UR: 12 MG/G CREATININE (ref 0–30)
MONOCYTES # BLD AUTO: 0.61 THOUSAND/ΜL (ref 0.17–1.22)
MONOCYTES NFR BLD AUTO: 12 % (ref 4–12)
NEUTROPHILS # BLD AUTO: 2.83 THOUSANDS/ΜL (ref 1.85–7.62)
NEUTS SEG NFR BLD AUTO: 52 % (ref 43–75)
NRBC BLD AUTO-RTO: 0 /100 WBCS
PLATELET # BLD AUTO: 271 THOUSANDS/UL (ref 149–390)
PMV BLD AUTO: 10.7 FL (ref 8.9–12.7)
POTASSIUM SERPL-SCNC: 4 MMOL/L (ref 3.5–5.3)
PROT SERPL-MCNC: 6.7 G/DL (ref 6.4–8.4)
RBC # BLD AUTO: 4.41 MILLION/UL (ref 3.81–5.12)
SODIUM SERPL-SCNC: 141 MMOL/L (ref 135–147)
TRIGL SERPL-MCNC: 117 MG/DL
WBC # BLD AUTO: 5.29 THOUSAND/UL (ref 4.31–10.16)

## 2024-10-25 PROCEDURE — 83036 HEMOGLOBIN GLYCOSYLATED A1C: CPT

## 2024-10-25 PROCEDURE — 80061 LIPID PANEL: CPT

## 2024-10-25 PROCEDURE — 80053 COMPREHEN METABOLIC PANEL: CPT

## 2024-10-25 PROCEDURE — 82570 ASSAY OF URINE CREATININE: CPT

## 2024-10-25 PROCEDURE — 36415 COLL VENOUS BLD VENIPUNCTURE: CPT

## 2024-10-25 PROCEDURE — 82043 UR ALBUMIN QUANTITATIVE: CPT

## 2024-10-25 PROCEDURE — 85025 COMPLETE CBC W/AUTO DIFF WBC: CPT

## 2024-10-28 ENCOUNTER — OFFICE VISIT (OUTPATIENT)
Dept: INTERNAL MEDICINE CLINIC | Facility: CLINIC | Age: 79
End: 2024-10-28
Payer: MEDICARE

## 2024-10-28 VITALS
TEMPERATURE: 98.6 F | DIASTOLIC BLOOD PRESSURE: 74 MMHG | BODY MASS INDEX: 29.96 KG/M2 | WEIGHT: 186.4 LBS | HEART RATE: 61 BPM | RESPIRATION RATE: 18 BRPM | SYSTOLIC BLOOD PRESSURE: 130 MMHG | HEIGHT: 66 IN | OXYGEN SATURATION: 100 %

## 2024-10-28 DIAGNOSIS — R35.89 POLYURIA: ICD-10-CM

## 2024-10-28 DIAGNOSIS — Z78.0 POSTMENOPAUSAL: ICD-10-CM

## 2024-10-28 DIAGNOSIS — E11.9 TYPE 2 DIABETES MELLITUS WITHOUT COMPLICATION, WITHOUT LONG-TERM CURRENT USE OF INSULIN (HCC): ICD-10-CM

## 2024-10-28 DIAGNOSIS — E78.2 MIXED HYPERLIPIDEMIA: ICD-10-CM

## 2024-10-28 DIAGNOSIS — I10 HYPERTENSION, BENIGN: ICD-10-CM

## 2024-10-28 DIAGNOSIS — Z00.00 MEDICARE ANNUAL WELLNESS VISIT, SUBSEQUENT: Primary | ICD-10-CM

## 2024-10-28 PROCEDURE — G0439 PPPS, SUBSEQ VISIT: HCPCS | Performed by: INTERNAL MEDICINE

## 2024-10-28 PROCEDURE — 99214 OFFICE O/P EST MOD 30 MIN: CPT | Performed by: INTERNAL MEDICINE

## 2024-10-28 RX ORDER — ROSUVASTATIN CALCIUM 10 MG/1
10 TABLET, COATED ORAL DAILY
Qty: 90 TABLET | Refills: 3 | Status: SHIPPED | OUTPATIENT
Start: 2024-10-28

## 2024-10-28 NOTE — PATIENT INSTRUCTIONS
Medicare Preventive Visit Patient Instructions  Thank you for completing your Welcome to Medicare Visit or Medicare Annual Wellness Visit today. Your next wellness visit will be due in one year (10/29/2025).  The screening/preventive services that you may require over the next 5-10 years are detailed below. Some tests may not apply to you based off risk factors and/or age. Screening tests ordered at today's visit but not completed yet may show as past due. Also, please note that scanned in results may not display below.  Preventive Screenings:  Service Recommendations Previous Testing/Comments   Colorectal Cancer Screening  * Colonoscopy    * Fecal Occult Blood Test (FOBT)/Fecal Immunochemical Test (FIT)  * Fecal DNA/Cologuard Test  * Flexible Sigmoidoscopy Age: 45-75 years old   Colonoscopy: every 10 years (may be performed more frequently if at higher risk)  OR  FOBT/FIT: every 1 year  OR  Cologuard: every 3 years  OR  Sigmoidoscopy: every 5 years  Screening may be recommended earlier than age 45 if at higher risk for colorectal cancer. Also, an individualized decision between you and your healthcare provider will decide whether screening between the ages of 76-85 would be appropriate. Colonoscopy: 05/25/2023  FOBT/FIT: Not on file  Cologuard: Not on file  Sigmoidoscopy: Not on file          Breast Cancer Screening Age: 40+ years old  Frequency: every 1-2 years  Not required if history of left and right mastectomy Mammogram: 05/20/2024        Cervical Cancer Screening Between the ages of 21-29, pap smear recommended once every 3 years.   Between the ages of 30-65, can perform pap smear with HPV co-testing every 5 years.   Recommendations may differ for women with a history of total hysterectomy, cervical cancer, or abnormal pap smears in past. Pap Smear: Not on file        Hepatitis C Screening Once for adults born between 1945 and 1965  More frequently in patients at high risk for Hepatitis C Hep C Antibody: Not  on file        Diabetes Screening 1-2 times per year if you're at risk for diabetes or have pre-diabetes Fasting glucose: 167 mg/dL (10/25/2024)  A1C: 7.5 % (10/25/2024)      Cholesterol Screening Once every 5 years if you don't have a lipid disorder. May order more often based on risk factors. Lipid panel: 10/25/2024          Other Preventive Screenings Covered by Medicare:  Abdominal Aortic Aneurysm (AAA) Screening: covered once if your at risk. You're considered to be at risk if you have a family history of AAA.  Lung Cancer Screening: covers low dose CT scan once per year if you meet all of the following conditions: (1) Age 55-77; (2) No signs or symptoms of lung cancer; (3) Current smoker or have quit smoking within the last 15 years; (4) You have a tobacco smoking history of at least 20 pack years (packs per day multiplied by number of years you smoked); (5) You get a written order from a healthcare provider.  Glaucoma Screening: covered annually if you're considered high risk: (1) You have diabetes OR (2) Family history of glaucoma OR (3)  aged 50 and older OR (4)  American aged 65 and older  Osteoporosis Screening: covered every 2 years if you meet one of the following conditions: (1) You're estrogen deficient and at risk for osteoporosis based off medical history and other findings; (2) Have a vertebral abnormality; (3) On glucocorticoid therapy for more than 3 months; (4) Have primary hyperparathyroidism; (5) On osteoporosis medications and need to assess response to drug therapy.   Last bone density test (DXA Scan): 08/02/2022.  HIV Screening: covered annually if you're between the age of 15-65. Also covered annually if you are younger than 15 and older than 65 with risk factors for HIV infection. For pregnant patients, it is covered up to 3 times per pregnancy.    Immunizations:  Immunization Recommendations   Influenza Vaccine Annual influenza vaccination during flu season is  recommended for all persons aged >= 6 months who do not have contraindications   Pneumococcal Vaccine   * Pneumococcal conjugate vaccine = PCV13 (Prevnar 13), PCV15 (Vaxneuvance), PCV20 (Prevnar 20)  * Pneumococcal polysaccharide vaccine = PPSV23 (Pneumovax) Adults 19-65 yo with certain risk factors or if 65+ yo  If never received any pneumonia vaccine: recommend Prevnar 20 (PCV20)  Give PCV20 if previously received 1 dose of PCV13 or PPSV23   Hepatitis B Vaccine 3 dose series if at intermediate or high risk (ex: diabetes, end stage renal disease, liver disease)   Respiratory syncytial virus (RSV) Vaccine - COVERED BY MEDICARE PART D  * RSVPreF3 (Arexvy) CDC recommends that adults 60 years of age and older may receive a single dose of RSV vaccine using shared clinical decision-making (SCDM)   Tetanus (Td) Vaccine - COST NOT COVERED BY MEDICARE PART B Following completion of primary series, a booster dose should be given every 10 years to maintain immunity against tetanus. Td may also be given as tetanus wound prophylaxis.   Tdap Vaccine - COST NOT COVERED BY MEDICARE PART B Recommended at least once for all adults. For pregnant patients, recommended with each pregnancy.   Shingles Vaccine (Shingrix) - COST NOT COVERED BY MEDICARE PART B  2 shot series recommended in those 19 years and older who have or will have weakened immune systems or those 50 years and older     Health Maintenance Due:      Topic Date Due   • Hepatitis C Screening  Never done   • Breast Cancer Screening: Mammogram  05/20/2025   • Colorectal Cancer Screening  Discontinued     Immunizations Due:      Topic Date Due   • Influenza Vaccine (1) 09/01/2024   • COVID-19 Vaccine (1 - 2023-24 season) Never done     Advance Directives   What are advance directives?  Advance directives are legal documents that state your wishes and plans for medical care. These plans are made ahead of time in case you lose your ability to make decisions for yourself.  Advance directives can apply to any medical decision, such as the treatments you want, and if you want to donate organs.   What are the types of advance directives?  There are many types of advance directives, and each state has rules about how to use them. You may choose a combination of any of the following:  Living will:  This is a written record of the treatment you want. You can also choose which treatments you do not want, which to limit, and which to stop at a certain time. This includes surgery, medicine, IV fluid, and tube feedings.   Durable power of  for healthcare (DPAHC):  This is a written record that states who you want to make healthcare choices for you when you are unable to make them for yourself. This person, called a proxy, is usually a family member or a friend. You may choose more than 1 proxy.  Do not resuscitate (DNR) order:  A DNR order is used in case your heart stops beating or you stop breathing. It is a request not to have certain forms of treatment, such as CPR. A DNR order may be included in other types of advance directives.  Medical directive:  This covers the care that you want if you are in a coma, near death, or unable to make decisions for yourself. You can list the treatments you want for each condition. Treatment may include pain medicine, surgery, blood transfusions, dialysis, IV or tube feedings, and a ventilator (breathing machine).  Values history:  This document has questions about your views, beliefs, and how you feel and think about life. This information can help others choose the care that you would choose.  Why are advance directives important?  An advance directive helps you control your care. Although spoken wishes may be used, it is better to have your wishes written down. Spoken wishes can be misunderstood, or not followed. Treatments may be given even if you do not want them. An advance directive may make it easier for your family to make difficult  choices about your care.   Weight Management   Why it is important to manage your weight:  Being overweight increases your risk of health conditions such as heart disease, high blood pressure, type 2 diabetes, and certain types of cancer. It can also increase your risk for osteoarthritis, sleep apnea, and other respiratory problems. Aim for a slow, steady weight loss. Even a small amount of weight loss can lower your risk of health problems.  How to lose weight safely:  A safe and healthy way to lose weight is to eat fewer calories and get regular exercise. You can lose up about 1 pound a week by decreasing the number of calories you eat by 500 calories each day.   Healthy meal plan for weight management:  A healthy meal plan includes a variety of foods, contains fewer calories, and helps you stay healthy. A healthy meal plan includes the following:  Eat whole-grain foods more often.  A healthy meal plan should contain fiber. Fiber is the part of grains, fruits, and vegetables that is not broken down by your body. Whole-grain foods are healthy and provide extra fiber in your diet. Some examples of whole-grain foods are whole-wheat breads and pastas, oatmeal, brown rice, and bulgur.  Eat a variety of vegetables every day.  Include dark, leafy greens such as spinach, kale, jackie greens, and mustard greens. Eat yellow and orange vegetables such as carrots, sweet potatoes, and winter squash.   Eat a variety of fruits every day.  Choose fresh or canned fruit (canned in its own juice or light syrup) instead of juice. Fruit juice has very little or no fiber.  Eat low-fat dairy foods.  Drink fat-free (skim) milk or 1% milk. Eat fat-free yogurt and low-fat cottage cheese. Try low-fat cheeses such as mozzarella and other reduced-fat cheeses.  Choose meat and other protein foods that are low in fat.  Choose beans or other legumes such as split peas or lentils. Choose fish, skinless poultry (chicken or turkey), or lean cuts  of red meat (beef or pork). Before you cook meat or poultry, cut off any visible fat.   Use less fat and oil.  Try baking foods instead of frying them. Add less fat, such as margarine, sour cream, regular salad dressing and mayonnaise to foods. Eat fewer high-fat foods. Some examples of high-fat foods include french fries, doughnuts, ice cream, and cakes.  Eat fewer sweets.  Limit foods and drinks that are high in sugar. This includes candy, cookies, regular soda, and sweetened drinks.  Exercise:  Exercise at least 30 minutes per day on most days of the week. Some examples of exercise include walking, biking, dancing, and swimming. You can also fit in more physical activity by taking the stairs instead of the elevator or parking farther away from stores. Ask your healthcare provider about the best exercise plan for you.      © Copyright [employer] 2018 Information is for End User's use only and may not be sold, redistributed or otherwise used for commercial purposes. All illustrations and images included in CareNotes® are the copyrighted property of [employer].A[employer]M., Inc[employer] or [employer]

## 2024-10-28 NOTE — ASSESSMENT & PLAN NOTE
Not adequately controlled.  She states she is taking the medicine.  Will increase the dose.  Orders:    rosuvastatin (CRESTOR) 10 MG tablet; Take 1 tablet (10 mg total) by mouth daily

## 2024-10-28 NOTE — ASSESSMENT & PLAN NOTE
Partly worsening due to diet but also her steroid injections.  She feels she can correct the diet and improve her numbers.  Therefore, no change in medications.  Lab Results   Component Value Date    HGBA1C 7.5 (H) 10/25/2024       Orders:    Comprehensive metabolic panel; Future    CBC and differential; Future    Hemoglobin A1C; Future    Lipid panel; Future

## 2024-10-28 NOTE — PROGRESS NOTES
Ambulatory Visit  Name: Radha Frank      : 1945      MRN: [id number]  Encounter Provider: Jason Christianson MD  Encounter Date: 10/28/2024   Encounter department: [facility] INTERNAL MEDICINE [place]    Assessment & Plan  Medicare annual wellness visit, subsequent         Type 2 diabetes mellitus without complication, without long-term current use of insulin (HCC)  Partly worsening due to diet but also her steroid injections.  She feels she can correct the diet and improve her numbers.  Therefore, no change in medications.  Lab Results   Component Value Date    HGBA1C 7.5 (H) 10/25/2024       Orders:    Comprehensive metabolic panel; Future    CBC and differential; Future    Hemoglobin A1C; Future    Lipid panel; Future    Mixed hyperlipidemia  Not adequately controlled.  She states she is taking the medicine.  Will increase the dose.  Orders:    rosuvastatin (CRESTOR) 10 MG tablet; Take 1 tablet (10 mg total) by mouth daily    Postmenopausal    Orders:    DXA bone density spine hip and pelvis; Future    Hypertension, benign         Polyuria  Still having a problem with this.  But feels it is more frequent now.  She would like to make sure she does not have an infection.  Orders:    Urinalysis with microscopic; Future    Urine culture; Future       Preventive health issues were discussed with patient, and age appropriate screening tests were ordered as noted in patient's After Visit Summary. Personalized health advice and appropriate referrals for health education or preventive services given if needed, as noted in patient's After Visit Summary.    History of Present Illness     Patient comes in today for routine follow-up and Medicare wellness with her .  She states she knew her sugar was going to be high.  She has had several injections with the pain management and orthopedics for her joints.  Steroid shots.  She was warned it would raise her sugars.  But she also admits that her diet has not  been good.  She has been taking her medicine.  Cholesterol is down but still not fully controlled.  Blood pressure is okay.  Taking her other medicines as directed.  Denies any new complaints today.  No further additions to her history.       Patient Care Team:  Jason Christianson MD as PCP - General  MD Froylan Sweet III, III, MD as Endoscopist    Review of Systems   Respiratory:  Negative for shortness of breath.    Cardiovascular:  Negative for chest pain.   Gastrointestinal:  Negative for abdominal pain.     Medical History Reviewed by provider this encounter:       Annual Wellness Visit Questionnaire   Radha is here for her Subsequent Wellness visit. Last Medicare Wellness visit information reviewed, patient interviewed and updates made to the record today.      Health Risk Assessment:   Patient rates overall health as good. Patient feels that their physical health rating is same. Patient is very satisfied with their life. Eyesight was rated as same. Hearing was rated as same. Patient feels that their emotional and mental health rating is same. Patients states they are never, rarely angry. Patient states they are sometimes unusually tired/fatigued. Pain experienced in the last 7 days has been some. Patient's pain rating has been 5/10. Patient states that she has experienced weight loss or gain in last 6 months.     Depression Screening:   PHQ-2 Score: 0      Fall Risk Screening:   In the past year, patient has experienced: no history of falling in past year      Urinary Incontinence Screening:   Patient has leaked urine accidently in the last six months.     Home Safety:  Patient has working smoke alarms and has working carbon monoxide detector. Home safety hazards include: none.     Nutrition:   Current diet is Regular.     Medications:   Patient is not currently taking any over-the-counter supplements. Patient is able to manage medications.     Activities of Daily Living (ADLs)/Instrumental  Activities of Daily Living (IADLs):   Walk and transfer into and out of bed and chair?: Yes  Dress and groom yourself?: Yes    Bathe or shower yourself?: Yes    Feed yourself? Yes  Do your laundry/housekeeping?: Yes  Manage your money, pay your bills and track your expenses?: Yes  Make your own meals?: Yes    Do your own shopping?: Yes    Previous Hospitalizations:   Any hospitalizations or ED visits within the last 12 months?: No      Advance Care Planning:   Living will: Yes    Durable POA for healthcare: Yes    Advanced directive: Yes    Advanced directive counseling given: Yes      Cognitive Screening:   Provider or family/friend/caregiver concerned regarding cognition?: No    PREVENTIVE SCREENINGS      Cardiovascular Screening:    General: Screening Not Indicated and History Lipid Disorder      Diabetes Screening:     General: Screening Not Indicated and History Diabetes      Colorectal Cancer Screening:     General: Screening Current      Breast Cancer Screening:     General: Screening Current      Cervical Cancer Screening:    General: Screening Not Indicated      Osteoporosis Screening:      Due for: Bone Density Ultrasound      Abdominal Aortic Aneurysm (AAA) Screening:        General: Screening Not Indicated      Lung Cancer Screening:     General: Screening Not Indicated      Hepatitis C Screening:    General: Screening Not Indicated    Screening, Brief Intervention, and Referral to Treatment (SBIRT)    Screening  Typical number of drinks in a day: 0  Typical number of drinks in a week: 0  Interpretation: Low risk drinking behavior.    AUDIT-C Screenin) How often did you have a drink containing alcohol in the past year? never  2) How many drinks did you have on a typical day when you were drinking in the past year? 0  3) How often did you have 6 or more drinks on one occasion in the past year? never    AUDIT-C Score: 0  Interpretation: Score 0-2 (female): Negative screen for alcohol misuse    Single  "Item Drug Screening:  How often have you used an illegal drug (including marijuana) or a prescription medication for non-medical reasons in the past year? never    Single Item Drug Screen Score: 0  Interpretation: Negative screen for possible drug use disorder    Brief Intervention  Alcohol & drug use screenings were reviewed. No concerns regarding substance use disorder identified.     Social Determinants of Health     Financial Resource Strain: Low Risk  (8/30/2023)    Overall Financial Resource Strain (CARDIA)     Difficulty of Paying Living Expenses: Not hard at all   Food Insecurity: No Food Insecurity (10/28/2024)    Hunger Vital Sign     Worried About Running Out of Food in the Last Year: Never true     Ran Out of Food in the Last Year: Never true   Transportation Needs: No Transportation Needs (10/28/2024)    PRAPARE - Transportation     Lack of Transportation (Medical): No     Lack of Transportation (Non-Medical): No   Housing Stability: Unknown (10/28/2024)    Housing Stability Vital Sign     Unable to Pay for Housing in the Last Year: No   Utilities: Not At Risk (10/28/2024)    [facility] Utilities     Threatened with loss of utilities: No     No results found.    Objective     /74 (BP Location: Left arm, Patient Position: Sitting, Cuff Size: Standard)   Pulse 61   Temp 98.6 °F (37 °C) (Tympanic)   Resp 18   Ht 5' 6\" (1.676 m)   Wt 84.6 kg (186 lb 6.4 oz)   SpO2 100%   BMI 30.09 kg/m²     Physical Exam  Vitals and nursing note reviewed.   Constitutional:       Appearance: Normal appearance. She is well-developed.   Cardiovascular:      Rate and Rhythm: Normal rate and regular rhythm.      Heart sounds: Normal heart sounds.   Pulmonary:      Effort: Pulmonary effort is normal.      Breath sounds: Normal breath sounds.   Abdominal:      General: Abdomen is flat.      Palpations: Abdomen is soft.      Tenderness: There is no abdominal tenderness.   Musculoskeletal:      Right lower leg: No edema.      " Left lower leg: No edema.   Neurological:      Mental Status: She is alert and oriented to person, place, and time.   Psychiatric:         Behavior: Behavior normal.         Thought Content: Thought content normal.         Judgment: Judgment normal.

## 2024-11-06 ENCOUNTER — DOCUMENTATION (OUTPATIENT)
Dept: PAIN MEDICINE | Facility: CLINIC | Age: 79
End: 2024-11-06

## 2024-12-17 ENCOUNTER — DOCUMENTATION (OUTPATIENT)
Dept: PAIN MEDICINE | Facility: CLINIC | Age: 79
End: 2024-12-17

## 2025-01-08 ENCOUNTER — HOSPITAL ENCOUNTER (OUTPATIENT)
Age: 80
Discharge: HOME | End: 2025-01-08
Payer: MEDICARE

## 2025-01-08 DIAGNOSIS — Z78.0 POSTMENOPAUSAL: ICD-10-CM

## 2025-01-08 PROCEDURE — 77080 DXA BONE DENSITY AXIAL: CPT

## 2025-01-13 ENCOUNTER — HOSPITAL ENCOUNTER (OUTPATIENT)
Dept: RADIOLOGY | Facility: HOSPITAL | Age: 80
Discharge: HOME | End: 2025-01-13
Payer: MEDICARE

## 2025-01-13 ENCOUNTER — OFFICE VISIT (OUTPATIENT)
Dept: INTERNAL MEDICINE CLINIC | Facility: CLINIC | Age: 80
End: 2025-01-13
Payer: MEDICARE

## 2025-01-13 VITALS
BODY MASS INDEX: 29.99 KG/M2 | HEIGHT: 65 IN | OXYGEN SATURATION: 96 % | HEART RATE: 65 BPM | RESPIRATION RATE: 17 BRPM | WEIGHT: 180 LBS | SYSTOLIC BLOOD PRESSURE: 138 MMHG | DIASTOLIC BLOOD PRESSURE: 84 MMHG

## 2025-01-13 DIAGNOSIS — R93.89 ABNORMAL CHEST X-RAY: ICD-10-CM

## 2025-01-13 DIAGNOSIS — J18.9 PNEUMONIA DUE TO INFECTIOUS ORGANISM, UNSPECIFIED LATERALITY, UNSPECIFIED PART OF LUNG: Primary | ICD-10-CM

## 2025-01-13 DIAGNOSIS — R25.1 TREMOR: ICD-10-CM

## 2025-01-13 DIAGNOSIS — J18.9 PNEUMONIA DUE TO INFECTIOUS ORGANISM, UNSPECIFIED LATERALITY, UNSPECIFIED PART OF LUNG: ICD-10-CM

## 2025-01-13 PROCEDURE — 99214 OFFICE O/P EST MOD 30 MIN: CPT | Performed by: INTERNAL MEDICINE

## 2025-01-13 PROCEDURE — 71046 X-RAY EXAM CHEST 2 VIEWS: CPT

## 2025-01-13 NOTE — PROGRESS NOTES
"Name: Radha Frank      : 1945      MRN: [id number]  Encounter Provider: Jason Christianson MD  Encounter Date: 2025   Encounter department: [facility] INTERNAL MEDICINE [place]  :  Assessment & Plan  Pneumonia due to infectious organism, unspecified laterality, unspecified part of lung  We will try and obtain chest x-ray report but she needs a repeat chest x-ray regardless.  Lungs sound clear.  Orders:  •  XR chest pa and lateral; Future    Abnormal chest x-ray  Referral to cardiology.  Suspect they may have seen cardiomegaly.  Orders:  •  Ambulatory Referral to Cardiology; Future    Tremor  This was going on before the pneumonia.  Orders:  •  Ambulatory Referral to Neurology; Future           History of Present Illness     Patient comes in today for follow-up.  On Ezio today she had gone to the urgent care in [place] while visiting her son because of respiratory issues.  She was told she had \"a touch of pneumonia\" apparently noted on chest x-ray.  But they then called her afterwards and said she needed to see her cardiologist, apparently for what ever was seen on the chest x-ray.  We do not have the report.  She brought the disc.  She feels better from the pneumonia.  Today, she also notes that she is noticing more and more tremors in her neck.  She had seen a neurologist a few years back for this.  Although it was mild back then.  It seems to be worsening.  This was before her recent illness.  She is on no new medicines.  But she would like to get reevaluated by neurology.  There is no family history of tremor that she is aware of.  There is no family history of Parkinson's that she is aware of.      Review of Systems   Respiratory:  Negative for shortness of breath.    Cardiovascular:  Negative for chest pain.   Gastrointestinal:  Negative for abdominal pain.       Objective   /84 (BP Location: Left arm, Patient Position: Sitting, Cuff Size: Adult)   Pulse 65   Resp 17   Ht 5' 4.5\" " (1.638 m)   Wt 81.6 kg (180 lb)   SpO2 96%   BMI 30.42 kg/m²      Physical Exam  Vitals and nursing note reviewed.   Constitutional:       Appearance: Normal appearance. She is well-developed.   Cardiovascular:      Rate and Rhythm: Normal rate and regular rhythm.      Heart sounds: Normal heart sounds.   Pulmonary:      Effort: Pulmonary effort is normal.      Breath sounds: Normal breath sounds.   Neurological:      Mental Status: She is alert and oriented to person, place, and time.

## 2025-01-14 ENCOUNTER — RESULTS FOLLOW-UP (OUTPATIENT)
Dept: INTERNAL MEDICINE CLINIC | Facility: CLINIC | Age: 80
End: 2025-01-14

## 2025-02-04 ENCOUNTER — OFFICE VISIT (OUTPATIENT)
Dept: CARDIOLOGY CLINIC | Facility: CLINIC | Age: 80
End: 2025-02-04
Payer: MEDICARE

## 2025-02-04 VITALS
WEIGHT: 178 LBS | RESPIRATION RATE: 16 BRPM | DIASTOLIC BLOOD PRESSURE: 78 MMHG | BODY MASS INDEX: 29.66 KG/M2 | HEART RATE: 65 BPM | SYSTOLIC BLOOD PRESSURE: 126 MMHG | OXYGEN SATURATION: 97 % | HEIGHT: 65 IN

## 2025-02-04 DIAGNOSIS — R01.1 MURMUR, CARDIAC: ICD-10-CM

## 2025-02-04 DIAGNOSIS — I10 HYPERTENSION, BENIGN: Primary | ICD-10-CM

## 2025-02-04 DIAGNOSIS — E11.9 TYPE 2 DIABETES MELLITUS WITHOUT COMPLICATION, WITHOUT LONG-TERM CURRENT USE OF INSULIN (HCC): ICD-10-CM

## 2025-02-04 DIAGNOSIS — E78.2 MIXED HYPERLIPIDEMIA: ICD-10-CM

## 2025-02-04 DIAGNOSIS — R93.89 ABNORMAL CHEST X-RAY: ICD-10-CM

## 2025-02-04 PROCEDURE — 93000 ELECTROCARDIOGRAM COMPLETE: CPT | Performed by: INTERNAL MEDICINE

## 2025-02-04 PROCEDURE — 99204 OFFICE O/P NEW MOD 45 MIN: CPT | Performed by: INTERNAL MEDICINE

## 2025-02-04 RX ORDER — ROSUVASTATIN CALCIUM 40 MG/1
40 TABLET, COATED ORAL DAILY
Qty: 90 TABLET | Refills: 3 | Status: SHIPPED | OUTPATIENT
Start: 2025-02-04

## 2025-02-04 NOTE — PROGRESS NOTES
Cardiology Consultation     Radha Frank  [id number]  1945  [address] [facility] [place] [id number]    Impression:  Abnormal chest x-ray, results unavailable, possible cardiomegaly  Recent pneumonia  Tremor  Hypertension  Diabetes  Hyperlipidemia- . Goal <70 in diabetics.   Mild MR  Mild pulmonary hypertension  Heart murmur    Plan:  We will repeat echo to assess LVF, mr and pulmonary hypertension.   Increase rosuvastatin to 40 mg a day.   We discussed PCSK9 inhibitors if needed in future.  Check EKG.  Recommend aggressive risk factor modification and therapeutic lifestyle changes.  Low-salt, low-calorie, low-fat, low-cholesterol diet with regular exercise and to optimize weight.  I will defer the ordering and monitoring of necessity lab studies to you, but I am available and happy to review and manage any of the data at your request in the future.    Discussed concepts of atherosclerosis, including signs and symptoms of cardiac disease.    Previous studies were reviewed.    Safety measures were reviewed.  All questions were entertained and answered.  Patient was advised to report any problems requiring medical attention.    Follow-up with PCP and appropriate specialist and lab work as discussed.    Return for follow up visit as scheduled or earlier, if needed.  Thank you for allowing me to participate in the care and evaluation of your patient.  Should you have any questions, please feel free to contact me.         The patient is a 79-year-old female referred from PCP for an abnormal chest x-ray.  She went to urgent care 12/25/24 with cough , fever and was treated for pnuemonia. No report available but it sounds like there was cardiomegaly seen on chest x-ray.  PCP ordered another chest x-ray 1/13/2025 which was normal, personally reviewed.     She now feels well. She doesn't exercise much. She  was doing PT before ellen but they stopped. No cp, sob, palpitations, syncope.       EKG today reveals sinus bradycardia with sinus arrhythmia, possible IMI, abnormal.    Echocardiogram 1/28/2019 EF 60%, DD, mild MR, mild AR, trace TR with mild pulmonary hypertension.      Patient Active Problem List   Diagnosis    Allergic rhinitis    Mixed hyperlipidemia    Hypertension, benign    Irritable bowel syndrome without diarrhea    Type 2 diabetes mellitus without complication, without long-term current use of insulin (HCC)    Abnormal mammogram    Breast pain, right    Paresthesia of both feet    Bilateral cold feet    Chronic bilateral low back pain    Other headache syndrome    History of colon polyps     Past Medical History:   Diagnosis Date    Allergic     Arthritis     Diabetes mellitus (HCC)     Diverticulitis of colon     GERD (gastroesophageal reflux disease)     Headache(784.0)     HNP (herniated nucleus pulposus), lumbar     Hyperlipidemia     Hypertension     IBS (irritable bowel syndrome)     Osteoarthritis      Social History     Socioeconomic History    Marital status: /Civil Union     Spouse name: Not on file    Number of children: 2    Years of education: Not on file    Highest education level: Not on file   Occupational History    Occupation: 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[employer]     Comment: retired   Tobacco Use    Smoking status: Never    Smokeless tobacco: Never   Vaping Use    Vaping status: Never Used   Substance and Sexual Activity    Alcohol use: No    Drug use: No    Sexual activity: Yes     Partners: Male     Birth control/protection: None   Other Topics Concern    Not on file   Social History Narrative    Not on file     Social Drivers of Health     Financial Resource Strain: Low Risk  (8/30/2023)    Overall Financial Resource Strain (CARDIA)     Difficulty of Paying Living Expenses: Not hard at all   Food Insecurity: No Food Insecurity (10/28/2024)    Hunger Vital Sign     Worried  About Running Out of Food in the Last Year: Never true     Ran Out of Food in the Last Year: Never true   Transportation Needs: No Transportation Needs (10/28/2024)    PRAPARE - Transportation     Lack of Transportation (Medical): No     Lack of Transportation (Non-Medical): No   Physical Activity: Not on file   Stress: Not on file   Social Connections: Not on file   Intimate Partner Violence: Not on file   Housing Stability: Unknown (10/28/2024)    Housing Stability Vital Sign     Unable to Pay for Housing in the Last Year: No     Number of Times Moved in the Last Year: Not on file     Homeless in the Last Year: Not on file      Family History   Problem Relation Age of Onset    Breast cancer Mother     Diabetes Mother     Heart defect Brother     Arthritis Family     Hypertension Family     No Known Problems Father     No Known Problems Brother      Past Surgical History:   Procedure Laterality Date    PARATHYROID GLAND SURGERY      resolved 05/12    [place] ESOPHAGOGASTRODUODENOSCOPY TRANSORAL DIAGNOSTIC N/A 04/19/2017    Procedure: EGD AND COLONOSCOPY;  Surgeon: Froylan Carbone III, MD;  Location: [facility];  Service: Gastroenterology    THYROID SURGERY      UMBILICAL HERNIA REPAIR         Current Outpatient Medications:     Accu-Chek Guide test strip, CHECK SUGAR DAILY, Disp: 100 strip, Rfl: 2    amLODIPine (NORVASC) 5 mg tablet, TAKE 1 TABLET (5 MG TOTAL) BY MOUTH DAILY., Disp: 90 tablet, Rfl: 3    clotrimazole-betamethasone (LOTRISONE) 1-0.05 % cream, Apply topically 2 (two) times a day, Disp: 45 g, Rfl: 1    dicyclomine (BENTYL) 10 mg capsule, TAKE 1 CAPSULE (10 MG TOTAL) BY MOUTH 3 TIMES A DAY AS NEEDED FOR ABDOMINAL PAIN, Disp: 270 capsule, Rfl: 1    hydrocortisone (ANUSOL-HC) 2.5 % rectal cream, Apply topically 2 (two) times a day, Disp: 28 g, Rfl: 3    levocetirizine (XYZAL) 5 MG tablet, Take 5 mg by mouth as needed , Disp: , Rfl:     lisinopril (ZESTRIL) 10 mg tablet, TAKE 1 TABLET BY MOUTH EVERY DAY, Disp:  "90 tablet, Rfl: 3    meloxicam (MOBIC) 15 mg tablet, Take 1 tablet (15 mg total) by mouth daily as needed for moderate pain USE THIS MEDICATION SPARINGLY., Disp: 15 tablet, Rfl: 0    metFORMIN (GLUCOPHAGE-XR) 500 mg 24 hr tablet, Take 2 tablets (1,000 mg total) by mouth 2 (two) times a day with meals, Disp: 360 tablet, Rfl: 3    pantoprazole (PROTONIX) 40 mg tablet, Take 1 tablet (40 mg total) by mouth daily, Disp: 90 tablet, Rfl: 1    rosuvastatin (CRESTOR) 10 MG tablet, Take 1 tablet (10 mg total) by mouth daily, Disp: 90 tablet, Rfl: 3    chlordiazepoxide-clidinium (LIBRAX) 5-2.5 mg per capsule, Take 1 capsule by mouth 4 (four) times a day (before meals and at bedtime) (Patient not taking: Reported on 2/4/2025), Disp: 360 capsule, Rfl: 0  Allergies   Allergen Reactions    Nuts - Food Allergy Anaphylaxis     Raw Almonds only    Codeine Other (See Comments)     Dizzy, lightheaded     Vitals:    02/04/25 1303   Pulse: 65   Resp: 16   SpO2: 97%   Weight: 80.7 kg (178 lb)   Height: 5' 4.5\" (1.638 m)       Labs:  Lab Results   Component Value Date    CHOL 220 04/06/2015    TRIG 117 10/25/2024    TRIG 87 04/06/2015    HDL 52 10/25/2024    HDL 60 04/06/2015     Imaging: XR chest pa and lateral  Result Date: 1/13/2025  Narrative: CHEST INDICATION:   Pneumonia, unspecified organism. COMPARISON:  None. EXAM PERFORMED/VIEWS:  XR CHEST PA AND LATERAL FINDINGS: Cardiomediastinal silhouette appears unremarkable. The lungs are clear.  No pneumothorax or pleural effusion. Osseous structures appear within normal limits for patient age.     Impression: No acute cardiopulmonary disease. No significant change from 5/16/2023. Electronically signed: 01/13/2025 07:49 PM Michael Fuller MD    DXA bone density spine hip and pelvis  Result Date: 1/10/2025  Narrative: DXA SCAN CLINICAL HISTORY: 79-year-old postmenopausal female. OTHER RISK FACTORS: PPI therapy. PHARMACOLOGIC THERAPY FOR OSTEOPOROSIS: None. TECHNIQUE: Bone densitometry was " performed using a Hologic Horizon W bone densitometer.  Regions of interest appear properly placed. COMPARISON: 8/2/2022. RESULTS: LUMBAR SPINE Level: L1-L4: BMD: 1.076 gm/cm2 T-score: -0.7 LEFT  TOTAL HIP: BMD: 0.613 gm/cm2 T-score: -2.7 LEFT  FEMORAL NECK: BMD: 0.508 gm/cm2 T score: -3.1     Impression: 1. Osteoporosis. 2.  Since a DXA study from 8/2/2022, there has been: A  STATISTICALLY SIGNIFICANT DECREASE in bone mineral density of 0.038 g/cm2 (3.4%) in the lumbar spine. A  STATISTICALLY SIGNIFICANT DECREASE in bone mineral density of 0.164 g/cm2 (21.1)% in the left total hip. 3.  The 10 year risk of hip fracture is 3.8% with the 10 year risk of major osteoporotic fracture being 10% as calculated by the [employer] fracture risk assessment tool (FRAX, which is based on data generated by the WHO Collaborating [place]  for Metabolic Bone Diseases). 4.  The current Bone Health and Osteoporosis Foundation (BHOF) guidelines recommend treating patients with a T-score of -2.5 or less in the lumbar spine or hips, or in post-menopausal women and men over the age of 50 with low bone mass (osteopenia; T-score between -1.0 and -2.5) and a FRAX 10 year risk score of > 3% for hip fracture and/or > 20% for major osteoporosis-related fracture (clinical vertebral, hip, forearm, or proximal humerus). 5.  The BHOF recommends follow-up DXA in 1-2 years after initiating or changing medical therapy for osteoporosis and at appropriate intervals thereafter, according to clinical circumstances. More frequent BMD testing may be warranted in higher-risk individuals (multiple fractures, older age, very low BMD). Less frequent BMD testing is warranted as follow-up in patients with initial T-scores in the normal or slightly below normal range (osteopenia) and for patients who have remained fracture free on  treatment. The FRAX algorithm has certain limitations: -FRAX has not been validated in patients currently or previously  treated with pharmacotherapy for osteoporosis.  In such patients, clinical judgment must be exercised in interpreting FRAX scores. -Prior hip, vertebral and humeral fragility fractures appear to confer greater risk of subsequent fracture than fractures at other sites (this is especially true for individuals with severe vertebral fractures), but quantification of this incremental risk is not possible with FRAX. -FRAX underestimates fracture risk in patients with history of multiple fragility fractures. -FRAX may underestimate fracture risk in patients with history of frequent falls. -It is not appropriate to use FRAX to monitor treatment response. WHO CLASSIFICATION: Normal (a T-score of -1.0 or higher) Low bone mineral density (a T-score of less than -1.0 but higher than -2.5) Osteoporosis (a T-score of -2.5 or less) Severe osteoporosis (a T-score of -2.5 or less with a fragility fracture) LEAST SIGNIFICANT CHANGE (AT 95% C.I): Lumbar spine: 0.034 g/cm2; 3.5% Total hip: 0.024 g/cm2; 3.0% Forearm: 0.021 g/cm2; 3.4% SELECTED REFERENCES: Khadra MS, Stormy SL, Damien KL, et al. The clinician's guide to prevention and treatment of osteoporosis. Osteoporos Int 2022; 33:7176-7050. Doug D, Felice SB, Melina A, et al. DXA reporting updates: 2023 Official Positions of the International Society for Clinical Densitometry. J Clin Densitom 2024; 27: 708562 (https://doi.org/10.1016/j.jocd.2023.213693). Workstation performed: [id number]       Review of Systems:  Review of Systems negative except for HPI    Physical Exam:  Physical Exam  GEN: Alert and oriented x 3, in no acute distress.  Well appearing and well nourished.   HEENT: Sclera anicteric, conjunctivae pink, mucous membranes moist. Oropharynx clear.   NECK: Supple, no carotid bruits, no significant JVD. Trachea midline, no thyromegaly.   HEART: Regular rhythm, normal S1 and S2, 2/6 systolic murmur, no clicks, gallops or rubs. PMI nondisplaced, no thrills.   LUNGS:  Clear to auscultation bilaterally; no wheezes, rales, or rhonchi. No increased work of breathing or signs of respiratory distress.   ABDOMEN: Soft, nontender, nondistended, normoactive bowel sounds.   EXTREMITIES: Skin warm and well perfused, no clubbing, cyanosis, or edema.  NEURO: No focal findings. Normal speech. Mood and affect normal.   SKIN: Normal without suspicious lesions on exposed skin.       1. Hypertension, benign        2. Abnormal chest x-ray  Ambulatory Referral to Cardiology      3. Type 2 diabetes mellitus without complication, without long-term current use of insulin (HCC)        4. Mixed hyperlipidemia

## 2025-02-28 ENCOUNTER — HOSPITAL ENCOUNTER (EMERGENCY)
Facility: HOSPITAL | Age: 80
Discharge: HOME | End: 2025-02-28
Attending: EMERGENCY MEDICINE
Payer: MEDICARE

## 2025-02-28 ENCOUNTER — APPOINTMENT (EMERGENCY)
Dept: CT IMAGING | Facility: HOSPITAL | Age: 80
End: 2025-02-28
Payer: MEDICARE

## 2025-02-28 VITALS
TEMPERATURE: 97.6 F | DIASTOLIC BLOOD PRESSURE: 71 MMHG | RESPIRATION RATE: 18 BRPM | OXYGEN SATURATION: 98 % | HEART RATE: 72 BPM | SYSTOLIC BLOOD PRESSURE: 112 MMHG

## 2025-02-28 DIAGNOSIS — R10.9 RIGHT FLANK PAIN: Primary | ICD-10-CM

## 2025-02-28 LAB
ALBUMIN SERPL BCG-MCNC: 3.7 G/DL (ref 3.5–5)
ALP SERPL-CCNC: 120 U/L (ref 34–104)
ALT SERPL W P-5'-P-CCNC: 10 U/L (ref 7–52)
ANION GAP SERPL CALCULATED.3IONS-SCNC: 7 MMOL/L (ref 4–13)
AST SERPL W P-5'-P-CCNC: 13 U/L (ref 13–39)
BACTERIA UR QL AUTO: ABNORMAL /HPF
BASOPHILS # BLD AUTO: 0.04 THOUSANDS/ÂΜL (ref 0–0.1)
BASOPHILS NFR BLD AUTO: 1 % (ref 0–1)
BILIRUB SERPL-MCNC: 0.37 MG/DL (ref 0.2–1)
BILIRUB UR QL STRIP: NEGATIVE
BUN SERPL-MCNC: 17 MG/DL (ref 5–25)
CALCIUM SERPL-MCNC: 9.9 MG/DL (ref 8.4–10.2)
CHLORIDE SERPL-SCNC: 105 MMOL/L (ref 96–108)
CLARITY UR: CLEAR
CO2 SERPL-SCNC: 26 MMOL/L (ref 21–32)
COLOR UR: ABNORMAL
CREAT SERPL-MCNC: 1.43 MG/DL (ref 0.6–1.3)
EOSINOPHIL # BLD AUTO: 0.29 THOUSAND/ÂΜL (ref 0–0.61)
EOSINOPHIL NFR BLD AUTO: 5 % (ref 0–6)
ERYTHROCYTE [DISTWIDTH] IN BLOOD BY AUTOMATED COUNT: 13.1 % (ref 11.6–15.1)
GFR SERPL CREATININE-BSD FRML MDRD: 34 ML/MIN/1.73SQ M
GLUCOSE SERPL-MCNC: 232 MG/DL (ref 65–140)
GLUCOSE UR STRIP-MCNC: NEGATIVE MG/DL
HCT VFR BLD AUTO: 40 % (ref 34.8–46.1)
HGB BLD-MCNC: 12.7 G/DL (ref 11.5–15.4)
HGB UR QL STRIP.AUTO: ABNORMAL
HYALINE CASTS #/AREA URNS LPF: ABNORMAL /LPF
IMM GRANULOCYTES # BLD AUTO: 0.01 THOUSAND/UL (ref 0–0.2)
IMM GRANULOCYTES NFR BLD AUTO: 0 % (ref 0–2)
KETONES UR STRIP-MCNC: NEGATIVE MG/DL
LEUKOCYTE ESTERASE UR QL STRIP: ABNORMAL
LIPASE SERPL-CCNC: 38 U/L (ref 11–82)
LYMPHOCYTES # BLD AUTO: 1.98 THOUSANDS/ÂΜL (ref 0.6–4.47)
LYMPHOCYTES NFR BLD AUTO: 33 % (ref 14–44)
MCH RBC QN AUTO: 28.5 PG (ref 26.8–34.3)
MCHC RBC AUTO-ENTMCNC: 31.8 G/DL (ref 31.4–37.4)
MCV RBC AUTO: 90 FL (ref 82–98)
MONOCYTES # BLD AUTO: 0.72 THOUSAND/ÂΜL (ref 0.17–1.22)
MONOCYTES NFR BLD AUTO: 12 % (ref 4–12)
MUCOUS THREADS UR QL AUTO: ABNORMAL
NEUTROPHILS # BLD AUTO: 3.02 THOUSANDS/ÂΜL (ref 1.85–7.62)
NEUTS SEG NFR BLD AUTO: 49 % (ref 43–75)
NITRITE UR QL STRIP: NEGATIVE
NON-SQ EPI CELLS URNS QL MICRO: ABNORMAL /HPF
NRBC BLD AUTO-RTO: 0 /100 WBCS
PH UR STRIP.AUTO: 5.5 [PH]
PLATELET # BLD AUTO: 237 THOUSANDS/UL (ref 149–390)
PMV BLD AUTO: 9.5 FL (ref 8.9–12.7)
POTASSIUM SERPL-SCNC: 4 MMOL/L (ref 3.5–5.3)
PROT SERPL-MCNC: 6.9 G/DL (ref 6.4–8.4)
PROT UR STRIP-MCNC: ABNORMAL MG/DL
RBC # BLD AUTO: 4.45 MILLION/UL (ref 3.81–5.12)
RBC #/AREA URNS AUTO: ABNORMAL /HPF
SODIUM SERPL-SCNC: 138 MMOL/L (ref 135–147)
SP GR UR STRIP.AUTO: 1.02 (ref 1–1.03)
UROBILINOGEN UR STRIP-ACNC: <2 MG/DL
WBC # BLD AUTO: 6.06 THOUSAND/UL (ref 4.31–10.16)
WBC #/AREA URNS AUTO: ABNORMAL /HPF

## 2025-02-28 PROCEDURE — 83690 ASSAY OF LIPASE: CPT

## 2025-02-28 PROCEDURE — 81001 URINALYSIS AUTO W/SCOPE: CPT | Performed by: EMERGENCY MEDICINE

## 2025-02-28 PROCEDURE — 36415 COLL VENOUS BLD VENIPUNCTURE: CPT

## 2025-02-28 PROCEDURE — 85025 COMPLETE CBC W/AUTO DIFF WBC: CPT

## 2025-02-28 PROCEDURE — 74176 CT ABD & PELVIS W/O CONTRAST: CPT

## 2025-02-28 PROCEDURE — 99284 EMERGENCY DEPT VISIT MOD MDM: CPT | Performed by: EMERGENCY MEDICINE

## 2025-02-28 PROCEDURE — 80053 COMPREHEN METABOLIC PANEL: CPT

## 2025-02-28 PROCEDURE — 99284 EMERGENCY DEPT VISIT MOD MDM: CPT

## 2025-02-28 RX ORDER — OXYCODONE HYDROCHLORIDE 5 MG/1
5 TABLET ORAL EVERY 4 HOURS PRN
Qty: 12 TABLET | Refills: 0 | Status: SHIPPED | OUTPATIENT
Start: 2025-02-28 | End: 2025-03-11

## 2025-02-28 RX ORDER — LIDOCAINE 50 MG/G
1 PATCH TOPICAL DAILY
Qty: 12 PATCH | Refills: 0 | Status: SHIPPED | OUTPATIENT
Start: 2025-02-28

## 2025-02-28 NOTE — DISCHARGE INSTRUCTIONS
You have some kidney cysts - benign, but should follow up with UROLOGY    A  personal message from Dr. Nitin Lewis,  Thank you so much for allowing me to care for you today.    I pride myself in the care and attention I give all my patients.  I hope you were a witness to this tonight.   If for any reason your condition does not improve or worsens, or you have a question that was not answered during your visit you can feel free to text me on my personal phone #  # [phone].   I will answer to your message and continue your care past your emergency room visit.     Please understand that although you are being discharged because your condition has been deemed stable and able to be managed on an outpatient setting. However your condition may worsen as part of the natural progression of the illness/condition, if this occurs please come back to the emergency department for a repeat evaluation.

## 2025-03-01 DIAGNOSIS — M47.816 LUMBAR SPONDYLOSIS: ICD-10-CM

## 2025-03-01 DIAGNOSIS — E11.9 TYPE 2 DIABETES MELLITUS WITHOUT COMPLICATION, WITHOUT LONG-TERM CURRENT USE OF INSULIN (HCC): ICD-10-CM

## 2025-03-01 DIAGNOSIS — M54.42 CHRONIC BILATERAL LOW BACK PAIN WITH LEFT-SIDED SCIATICA: ICD-10-CM

## 2025-03-01 DIAGNOSIS — G89.29 CHRONIC BILATERAL LOW BACK PAIN WITH LEFT-SIDED SCIATICA: ICD-10-CM

## 2025-03-01 DIAGNOSIS — I10 HYPERTENSION, BENIGN: ICD-10-CM

## 2025-03-02 ENCOUNTER — HOSPITAL ENCOUNTER (EMERGENCY)
Facility: HOSPITAL | Age: 80
Discharge: HOME | End: 2025-03-02
Attending: EMERGENCY MEDICINE | Admitting: EMERGENCY MEDICINE
Payer: MEDICARE

## 2025-03-02 ENCOUNTER — APPOINTMENT (EMERGENCY)
Dept: RADIOLOGY | Facility: HOSPITAL | Age: 80
End: 2025-03-02
Payer: MEDICARE

## 2025-03-02 VITALS
TEMPERATURE: 98.2 F | OXYGEN SATURATION: 96 % | RESPIRATION RATE: 15 BRPM | SYSTOLIC BLOOD PRESSURE: 142 MMHG | BODY MASS INDEX: 30.08 KG/M2 | HEART RATE: 50 BPM | WEIGHT: 178 LBS | DIASTOLIC BLOOD PRESSURE: 70 MMHG

## 2025-03-02 DIAGNOSIS — R07.9 CHEST PAIN: Primary | ICD-10-CM

## 2025-03-02 DIAGNOSIS — M25.532 ACUTE PAIN OF LEFT WRIST: ICD-10-CM

## 2025-03-02 DIAGNOSIS — M25.432 WRIST SWELLING, LEFT: ICD-10-CM

## 2025-03-02 LAB
ANION GAP SERPL CALCULATED.3IONS-SCNC: 7 MMOL/L (ref 4–13)
ATRIAL RATE: 63 BPM
BASOPHILS # BLD AUTO: 0.02 THOUSANDS/ÂΜL (ref 0–0.1)
BASOPHILS NFR BLD AUTO: 0 % (ref 0–1)
BUN SERPL-MCNC: 16 MG/DL (ref 5–25)
CALCIUM SERPL-MCNC: 10 MG/DL (ref 8.4–10.2)
CARDIAC TROPONIN I PNL SERPL HS: <2 NG/L (ref ?–50)
CHLORIDE SERPL-SCNC: 105 MMOL/L (ref 96–108)
CO2 SERPL-SCNC: 26 MMOL/L (ref 21–32)
CREAT SERPL-MCNC: 1.53 MG/DL (ref 0.6–1.3)
EOSINOPHIL # BLD AUTO: 0.15 THOUSAND/ÂΜL (ref 0–0.61)
EOSINOPHIL NFR BLD AUTO: 2 % (ref 0–6)
ERYTHROCYTE [DISTWIDTH] IN BLOOD BY AUTOMATED COUNT: 13 % (ref 11.6–15.1)
GFR SERPL CREATININE-BSD FRML MDRD: 32 ML/MIN/1.73SQ M
GLUCOSE SERPL-MCNC: 183 MG/DL (ref 65–140)
HCT VFR BLD AUTO: 39.4 % (ref 34.8–46.1)
HGB BLD-MCNC: 12.9 G/DL (ref 11.5–15.4)
IMM GRANULOCYTES # BLD AUTO: 0.01 THOUSAND/UL (ref 0–0.2)
IMM GRANULOCYTES NFR BLD AUTO: 0 % (ref 0–2)
LYMPHOCYTES # BLD AUTO: 1.76 THOUSANDS/ÂΜL (ref 0.6–4.47)
LYMPHOCYTES NFR BLD AUTO: 22 % (ref 14–44)
MCH RBC QN AUTO: 29.1 PG (ref 26.8–34.3)
MCHC RBC AUTO-ENTMCNC: 32.7 G/DL (ref 31.4–37.4)
MCV RBC AUTO: 89 FL (ref 82–98)
MONOCYTES # BLD AUTO: 0.97 THOUSAND/ÂΜL (ref 0.17–1.22)
MONOCYTES NFR BLD AUTO: 12 % (ref 4–12)
NEUTROPHILS # BLD AUTO: 4.93 THOUSANDS/ÂΜL (ref 1.85–7.62)
NEUTS SEG NFR BLD AUTO: 64 % (ref 43–75)
NRBC BLD AUTO-RTO: 0 /100 WBCS
P AXIS: 86 DEGREES
PLATELET # BLD AUTO: 226 THOUSANDS/UL (ref 149–390)
PMV BLD AUTO: 9.8 FL (ref 8.9–12.7)
POTASSIUM SERPL-SCNC: 3.8 MMOL/L (ref 3.5–5.3)
PR INTERVAL: 144 MS
QRS AXIS: 67 DEGREES
QRSD INTERVAL: 90 MS
QT INTERVAL: 392 MS
QTC INTERVAL: 401 MS
RBC # BLD AUTO: 4.44 MILLION/UL (ref 3.81–5.12)
SODIUM SERPL-SCNC: 138 MMOL/L (ref 135–147)
T WAVE AXIS: 47 DEGREES
VENTRICULAR RATE: 63 BPM
WBC # BLD AUTO: 7.84 THOUSAND/UL (ref 4.31–10.16)

## 2025-03-02 PROCEDURE — 85025 COMPLETE CBC W/AUTO DIFF WBC: CPT | Performed by: EMERGENCY MEDICINE

## 2025-03-02 PROCEDURE — 93005 ELECTROCARDIOGRAM TRACING: CPT

## 2025-03-02 PROCEDURE — 99284 EMERGENCY DEPT VISIT MOD MDM: CPT | Performed by: EMERGENCY MEDICINE

## 2025-03-02 PROCEDURE — 80048 BASIC METABOLIC PNL TOTAL CA: CPT | Performed by: EMERGENCY MEDICINE

## 2025-03-02 PROCEDURE — 73110 X-RAY EXAM OF WRIST: CPT

## 2025-03-02 PROCEDURE — 36415 COLL VENOUS BLD VENIPUNCTURE: CPT | Performed by: EMERGENCY MEDICINE

## 2025-03-02 PROCEDURE — 93010 ELECTROCARDIOGRAM REPORT: CPT | Performed by: INTERNAL MEDICINE

## 2025-03-02 PROCEDURE — 99285 EMERGENCY DEPT VISIT HI MDM: CPT

## 2025-03-02 PROCEDURE — 84484 ASSAY OF TROPONIN QUANT: CPT | Performed by: EMERGENCY MEDICINE

## 2025-03-02 RX ORDER — OXYCODONE HYDROCHLORIDE 5 MG/1
5 TABLET ORAL ONCE
Refills: 0 | Status: COMPLETED | OUTPATIENT
Start: 2025-03-02 | End: 2025-03-02

## 2025-03-02 RX ORDER — ACETAMINOPHEN 325 MG/1
975 TABLET ORAL ONCE
Status: COMPLETED | OUTPATIENT
Start: 2025-03-02 | End: 2025-03-02

## 2025-03-02 RX ORDER — OXYCODONE HYDROCHLORIDE 5 MG/1
5 TABLET ORAL EVERY 6 HOURS PRN
Qty: 12 TABLET | Refills: 0 | Status: SHIPPED | OUTPATIENT
Start: 2025-03-02 | End: 2025-03-12

## 2025-03-02 RX ADMIN — OXYCODONE HYDROCHLORIDE 5 MG: 5 TABLET ORAL at 07:38

## 2025-03-02 RX ADMIN — ACETAMINOPHEN 975 MG: 325 TABLET, FILM COATED ORAL at 07:38

## 2025-03-02 NOTE — ED PROVIDER NOTES
Time reflects when diagnosis was documented in both MDM as applicable and the Disposition within this note       Time User Action Codes Description Comment    3/2/2025  8:38 AM Issa Loredo Add [R07.9] Chest pain     3/2/2025  8:38 AM [email or web address] Add [M25.532] Acute pain of left wrist     3/2/2025  8:38 AM Issa Loredo Add [M25.432] Wrist swelling, left           ED Disposition       ED Disposition   Discharge    Condition   Stable    Date/Time   Sun Mar 2, 2025  8:38 AM    Comment   Radha Frank discharge to home/self care.                   Assessment & Plan       Medical Decision Making  79-year-old female history of hypertension and diabetes presenting with left wrist pain and chest pain.  Plan for cardiac evaluation including EKG troponin.  Basic labs.  Symptom management with oral medications.  X-rays.  Reassess.    EKG interpreted by me with sinus rhythm and nonspecific ST abnormality.  X-rays interpreted by me without significant acute osseous process.  Symptoms improving with medications.  Labs interpreted by me without significant acute process.  Prescription sent to pharmacy.  Pain medication precautions. Discussed results and recommendations. Advised follow up PCP. Medication recommendations. Given instructions and return precautions. Patient/family at bedside acknowledged understanding of all written and verbal instructions and return precautions. Discharged.     Amount and/or Complexity of Data Reviewed  Labs: ordered.  Radiology: ordered.    Risk  OTC drugs.  Prescription drug management.             Medications   acetaminophen (TYLENOL) tablet 975 mg (975 mg Oral Given 3/2/25 0738)   oxyCODONE (ROXICODONE) IR tablet 5 mg (5 mg Oral Given 3/2/25 0738)       ED Risk Strat Scores   HEART Risk Score      Flowsheet Row Most Recent Value   Heart Score Risk Calculator    History 0 Filed at: 03/02/2025 0900   ECG 1 Filed at: 03/02/2025 0900   Age 2 Filed at: 03/02/2025 0900   Risk Factors 2 Filed at:  03/02/2025 0900   Troponin 0 Filed at: 03/02/2025 0900   HEART Score 5 Filed at: 03/02/2025 0900          HEART Risk Score      Flowsheet Row Most Recent Value   Heart Score Risk Calculator    History 0 Filed at: 03/02/2025 0900   ECG 1 Filed at: 03/02/2025 0900   Age 2 Filed at: 03/02/2025 0900   Risk Factors 2 Filed at: 03/02/2025 0900   Troponin 0 Filed at: 03/02/2025 0900   HEART Score 5 Filed at: 03/02/2025 0900                              SBIRT 20yo+      Flowsheet Row Most Recent Value   Initial Alcohol Screen: US AUDIT-C     1. How often do you have a drink containing alcohol? 0 Filed at: 03/02/2025 0725   2. How many drinks containing alcohol do you have on a typical day you are drinking?  0 Filed at: 03/02/2025 0725   3b. FEMALE Any Age, or MALE 65+: How often do you have 4 or more drinks on one occassion? 0 Filed at: 03/02/2025 0725   Audit-C Score 0 Filed at: 03/02/2025 0725   ALEKSEY: How many times in the past year have you...    Used an illegal drug or used a prescription medication for non-medical reasons? Never Filed at: 03/02/2025 0725                            History of Present Illness       Chief Complaint   Patient presents with    Chest Pain     Left sided wrist pain, denies injury/trauma to area. Pt also started with chest pain while driving to the hospital, denies cardiac history         Past Medical History:   Diagnosis Date    Allergic     Arthritis     Diabetes mellitus (HCC)     Diverticulitis of colon     GERD (gastroesophageal reflux disease)     Headache(784.0)     HNP (herniated nucleus pulposus), lumbar     Hyperlipidemia     Hypertension     IBS (irritable bowel syndrome)     Osteoarthritis       Past Surgical History:   Procedure Laterality Date    PARATHYROID GLAND SURGERY      resolved 05/12    RI ESOPHAGOGASTRODUODENOSCOPY TRANSORAL DIAGNOSTIC N/A 04/19/2017    Procedure: EGD AND COLONOSCOPY;  Surgeon: Froylan Carbone III, MD;  Location: [facility];  Service: Gastroenterology     THYROID SURGERY      UMBILICAL HERNIA REPAIR        Family History   Problem Relation Age of Onset    Breast cancer Mother     Diabetes Mother     Heart defect Brother     Arthritis Family     Hypertension Family     No Known Problems Father     No Known Problems Brother       Social History     Tobacco Use    Smoking status: Never    Smokeless tobacco: Never   Vaping Use    Vaping status: Never Used   Substance Use Topics    Alcohol use: No    Drug use: No      E-Cigarette/Vaping    E-Cigarette Use Never User       E-Cigarette/Vaping Substances    Nicotine No     THC No     CBD No     Flavoring No     Other No     Unknown No       I have reviewed and agree with the history as documented.     79-year-old female history of hypertension and diabetes presenting with left wrist pain and chest pain.  Patient reports insidious onset of chest discomfort that is currently resolved.  Denies any association with exertion, diaphoresis, lightheadedness/syncope, nausea/vomiting, radiation.  Denies any shortness of breath.  Mild swelling and tenderness to left lateral wrist.  Denies any injury.  Full range of motion intact.  2+ radial pulses.  Right-hand-dominant.  Denies any neurological changes such as motor or sensory deficits.  Denies any other complaints.  Chart reviewed.    Past Medical History:  No date: Allergic  No date: Arthritis  No date: Diabetes mellitus (HCC)  No date: Diverticulitis of colon  No date: GERD (gastroesophageal reflux disease)  No date: Headache(784.0)  No date: HNP (herniated nucleus pulposus), lumbar  No date: Hyperlipidemia  No date: Hypertension  No date: IBS (irritable bowel syndrome)  No date: Osteoarthritis  Family History: non-contributory  Social History          Review of Systems   Constitutional:  Negative for appetite change, chills, diaphoresis, fever and unexpected weight change.   HENT:  Negative for congestion and rhinorrhea.    Eyes:  Negative for photophobia and visual disturbance.    Respiratory:  Negative for cough, chest tightness and shortness of breath.    Cardiovascular:  Positive for chest pain. Negative for palpitations and leg swelling.   Gastrointestinal:  Negative for abdominal distention, abdominal pain, blood in stool, constipation, diarrhea, nausea and vomiting.   Genitourinary:  Negative for dysuria and hematuria.   Musculoskeletal:  Positive for arthralgias. Negative for back pain, joint swelling, neck pain and neck stiffness.   Skin:  Negative for color change, pallor, rash and wound.   Neurological:  Negative for dizziness, syncope, weakness, light-headedness and headaches.   Psychiatric/Behavioral:  Negative for agitation.    All other systems reviewed and are negative.          Objective       ED Triage Vitals [03/02/25 0725]   Temperature Pulse Blood Pressure Respirations SpO2 Patient Position - Orthostatic VS   98.2 °F (36.8 °C) 60 (!) 180/86 20 97 % Sitting      Temp Source Heart Rate Source BP Location FiO2 (%) Pain Score    Oral Monitor Left arm -- 10 - Worst Possible Pain      Vitals      Date and Time Temp Pulse SpO2 Resp BP Pain Score FACES Pain Rating User   03/02/25 0800 -- 50 96 % 15 142/70 -- -- JK   03/02/25 0738 -- -- -- -- -- 9 --    03/02/25 0725 98.2 °F (36.8 °C) 60 97 % 20 180/86 10 - Worst Possible Pain -- RD            Physical Exam  Vitals and nursing note reviewed.   Constitutional:       General: She is not in acute distress.     Appearance: Normal appearance. She is well-developed. She is not ill-appearing, toxic-appearing or diaphoretic.   HENT:      Head: Normocephalic and atraumatic.      Nose: Nose normal. No congestion or rhinorrhea.      Mouth/Throat:      Mouth: Mucous membranes are moist.      Pharynx: Oropharynx is clear. No oropharyngeal exudate or posterior oropharyngeal erythema.   Eyes:      General: No scleral icterus.        Right eye: No discharge.         Left eye: No discharge.      Extraocular Movements: Extraocular movements  intact.      Conjunctiva/sclera: Conjunctivae normal.      Pupils: Pupils are equal, round, and reactive to light.   Neck:      Vascular: No JVD.      Trachea: No tracheal deviation.      Comments: Supple. Normal range of motion.   Cardiovascular:      Rate and Rhythm: Normal rate and regular rhythm.      Heart sounds: Normal heart sounds. No murmur heard.     No friction rub. No gallop.      Comments: Normal rate and regular rhythm  Pulmonary:      Effort: Pulmonary effort is normal. No respiratory distress.      Breath sounds: Normal breath sounds. No stridor. No wheezing or rales.      Comments: Clear to auscultation bilaterally  Chest:      Chest wall: No tenderness.   Abdominal:      General: Bowel sounds are normal. There is no distension.      Palpations: Abdomen is soft.      Tenderness: There is no abdominal tenderness. There is no right CVA tenderness, left CVA tenderness, guarding or rebound.      Comments: Soft, nontender, nondistended.  Normal bowel sounds throughout   Musculoskeletal:         General: No swelling, tenderness, deformity or signs of injury. Normal range of motion.      Cervical back: Normal range of motion and neck supple. No rigidity. No muscular tenderness.      Right lower leg: No edema.      Left lower leg: No edema.      Comments: Left lateral wrist tenderness and mild swelling.  Full range of motion intact.  2+ radial pulses   Lymphadenopathy:      Cervical: No cervical adenopathy.   Skin:     General: Skin is warm and dry.      Coloration: Skin is not pale.      Findings: No erythema or rash.   Neurological:      General: No focal deficit present.      Mental Status: She is alert. Mental status is at baseline.      Sensory: No sensory deficit.      Motor: No weakness or abnormal muscle tone.      Coordination: Coordination normal.      Gait: Gait normal.      Comments: Alert.  Strength and sensation grossly intact.  Ambulatory without difficulty at baseline.    Psychiatric:          Behavior: Behavior normal.         Thought Content: Thought content normal.         Results Reviewed       Procedure Component Value Units Date/Time    Basic metabolic panel [[id number]]  (Abnormal) Collected: 03/02/25 0740    Lab Status: Final result Specimen: Blood from Arm, Right Updated: 03/02/25 0817     Sodium 138 mmol/L      Potassium 3.8 mmol/L      Chloride 105 mmol/L      CO2 26 mmol/L      ANION GAP 7 mmol/L      BUN 16 mg/dL      Creatinine 1.53 mg/dL      Glucose 183 mg/dL      Calcium 10.0 mg/dL      eGFR 32 ml/min/1.73sq m     Narrative:      National Kidney Disease Foundation guidelines for Chronic Kidney Disease (CKD):     Stage 1 with normal or high GFR (GFR > 90 mL/min/1.73 square meters)    Stage 2 Mild CKD (GFR = 60-89 mL/min/1.73 square meters)    Stage 3A Moderate CKD (GFR = 45-59 mL/min/1.73 square meters)    Stage 3B Moderate CKD (GFR = 30-44 mL/min/1.73 square meters)    Stage 4 Severe CKD (GFR = 15-29 mL/min/1.73 square meters)    Stage 5 End Stage CKD (GFR <15 mL/min/1.73 square meters)  Note: GFR calculation is accurate only with a steady state creatinine    HS Troponin 0hr (reflex protocol) [[id number]]  (Normal) Collected: 03/02/25 0740    Lab Status: Final result Specimen: Blood from Arm, Right Updated: 03/02/25 0811     hs TnI 0hr <2 ng/L     CBC and differential [[id number]] Collected: 03/02/25 0740    Lab Status: Final result Specimen: Blood from Arm, Right Updated: 03/02/25 0748     WBC 7.84 Thousand/uL      RBC 4.44 Million/uL      Hemoglobin 12.9 g/dL      Hematocrit 39.4 %      MCV 89 fL      MCH 29.1 pg      MCHC 32.7 g/dL      RDW 13.0 %      MPV 9.8 fL      Platelets 226 Thousands/uL      nRBC 0 /100 WBCs      Segmented % 64 %      Immature Grans % 0 %      Lymphocytes % 22 %      Monocytes % 12 %      Eosinophils Relative 2 %      Basophils Relative 0 %      Absolute Neutrophils 4.93 Thousands/µL      Absolute Immature Grans 0.01 Thousand/uL      Absolute Lymphocytes 1.76  Thousands/µL      Absolute Monocytes 0.97 Thousand/µL      Eosinophils Absolute 0.15 Thousand/µL      Basophils Absolute 0.02 Thousands/µL             XR wrist 3+ views LEFT   Final Interpretation by E. 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 Progression of radiocarpal degenerative changes         Computerized Assisted Algorithm (CAA) may have been used to analyze all applicable images.            Workstation performed: [id number]             Procedures    ED Medication and Procedure Management   Prior to Admission Medications   Prescriptions Last Dose Informant Patient Reported? Taking?   Accu-Chek Guide test strip  Self No No   Sig: CHECK SUGAR DAILY   amLODIPine (NORVASC) 5 mg tablet  Self No No   Sig: TAKE 1 TABLET (5 MG TOTAL) BY MOUTH DAILY.   chlordiazepoxide-clidinium (LIBRAX) 5-2.5 mg per capsule   No No   Sig: Take 1 capsule by mouth 4 (four) times a day (before meals and at bedtime)   Patient not taking: Reported on 2/4/2025   clotrimazole-betamethasone (LOTRISONE) 1-0.05 % cream  Self No No   Sig: Apply topically 2 (two) times a day   dicyclomine (BENTYL) 10 mg capsule  Self No No   Sig: TAKE 1 CAPSULE (10 MG TOTAL) BY MOUTH 3 TIMES A DAY AS NEEDED FOR ABDOMINAL PAIN   hydrocortisone (ANUSOL-HC) 2.5 % rectal cream  Self No No   Sig: Apply topically 2 (two) times a day   levocetirizine (XYZAL) 5 MG tablet  Self Yes No   Sig: Take 5 mg by mouth as needed    lidocaine (Lidoderm) 5 %   No No   Sig: Apply 1 patch topically over 12 hours daily Remove & Discard patch within 12 hours or as directed by MD   lisinopril (ZESTRIL) 10 mg tablet  Self No No   Sig: TAKE 1 TABLET BY MOUTH EVERY DAY   meloxicam (MOBIC) 15 mg tablet  Self No No   Sig: Take 1 tablet (15 mg total) by mouth daily as needed for moderate pain USE THIS MEDICATION SPARINGLY.   metFORMIN (GLUCOPHAGE-XR) 500 mg 24 hr tablet  Self No No   Sig: Take 2 tablets (1,000 mg total) by  mouth 2 (two) times a day with meals   oxyCODONE (Roxicodone) 5 immediate release tablet   No No   Sig: Take 1 tablet (5 mg total) by mouth every 4 (four) hours as needed for moderate pain for up to 10 days Max Daily Amount: 30 mg   pantoprazole (PROTONIX) 40 mg tablet  Self No No   Sig: Take 1 tablet (40 mg total) by mouth daily   rosuvastatin (CRESTOR) 40 MG tablet   No No   Sig: Take 1 tablet (40 mg total) by mouth daily      Facility-Administered Medications: None     Discharge Medication List as of 3/2/2025  8:40 AM        START taking these medications    Details   !! oxyCODONE (Roxicodone) 5 immediate release tablet Take 1 tablet (5 mg total) by mouth every 6 (six) hours as needed for severe pain for up to 10 days Max Daily Amount: 20 mg, Starting Sun 3/2/2025, Until Wed 3/12/2025 at 2359, Normal       !! - Potential duplicate medications found. Please discuss with provider.        CONTINUE these medications which have NOT CHANGED    Details   Accu-Chek Guide test strip CHECK SUGAR DAILY, Normal      amLODIPine (NORVASC) 5 mg tablet TAKE 1 TABLET (5 MG TOTAL) BY MOUTH DAILY., Starting Wed 4/10/2024, Normal      chlordiazepoxide-clidinium (LIBRAX) 5-2.5 mg per capsule Take 1 capsule by mouth 4 (four) times a day (before meals and at bedtime), Starting Tue 9/24/2024, Normal      clotrimazole-betamethasone (LOTRISONE) 1-0.05 % cream Apply topically 2 (two) times a day, Starting Thu 6/13/2024, Normal      dicyclomine (BENTYL) 10 mg capsule TAKE 1 CAPSULE (10 MG TOTAL) BY MOUTH 3 TIMES A DAY AS NEEDED FOR ABDOMINAL PAIN, Normal      hydrocortisone (ANUSOL-HC) 2.5 % rectal cream Apply topically 2 (two) times a day, Starting Tue 4/18/2023, Normal      levocetirizine (XYZAL) 5 MG tablet Take 5 mg by mouth as needed , Historical Med      lidocaine (Lidoderm) 5 % Apply 1 patch topically over 12 hours daily Remove & Discard patch within 12 hours or as directed by MD, Starting Fri 2/28/2025, Normal      lisinopril  (ZESTRIL) 10 mg tablet TAKE 1 TABLET BY MOUTH EVERY DAY, Starting Wed 4/10/2024, Normal      meloxicam (MOBIC) 15 mg tablet Take 1 tablet (15 mg total) by mouth daily as needed for moderate pain USE THIS MEDICATION SPARINGLY., Starting Mon 7/29/2024, Normal      metFORMIN (GLUCOPHAGE-XR) 500 mg 24 hr tablet Take 2 tablets (1,000 mg total) by mouth 2 (two) times a day with meals, Starting Thu 6/13/2024, Normal      !! oxyCODONE (Roxicodone) 5 immediate release tablet Take 1 tablet (5 mg total) by mouth every 4 (four) hours as needed for moderate pain for up to 10 days Max Daily Amount: 30 mg, Starting Fri 2/28/2025, Until Mon 3/10/2025 at 2359, Normal      pantoprazole (PROTONIX) 40 mg tablet Take 1 tablet (40 mg total) by mouth daily, Starting Mon 6/3/2024, Normal      rosuvastatin (CRESTOR) 40 MG tablet Take 1 tablet (40 mg total) by mouth daily, Starting Tue 2/4/2025, Normal       !! - Potential duplicate medications found. Please discuss with provider.        No discharge procedures on file.  ED SEPSIS DOCUMENTATION   Time reflects when diagnosis was documented in both MDM as applicable and the Disposition within this note       Time User Action Codes Description Comment    3/2/2025  8:38 AM Issa Loredo [R07.9] Chest pain     3/2/2025  8:38 AM Issa Loredo [M25.532] Acute pain of left wrist     3/2/2025  8:38 AM Issa Loredo [M25.432] Wrist swelling, left                  Issa RIAZ Loredo MD  03/02/25 3119

## 2025-03-02 NOTE — DISCHARGE INSTRUCTIONS
Please follow up PCP.  If using the strong pain medication oxycodone, please do not drive or operate heavy machinery or perform other dangerous tasks.  Recommend rest, ice, compression, elevation.  Recommend tylenol 650 mg every 6 hours as needed for pain. Please return for severe chest pain, significant shortness of breath, severely worsening symptoms, or any other concerning signs or symptoms. Please refer to the following documents for additional instructions and return precautions.

## 2025-03-03 ENCOUNTER — TELEPHONE (OUTPATIENT)
Age: 80
End: 2025-03-03

## 2025-03-03 RX ORDER — BLOOD SUGAR DIAGNOSTIC
STRIP MISCELLANEOUS
Qty: 100 STRIP | Refills: 1 | Status: SHIPPED | OUTPATIENT
Start: 2025-03-03

## 2025-03-03 RX ORDER — LISINOPRIL 10 MG/1
10 TABLET ORAL DAILY
Qty: 90 TABLET | Refills: 1 | Status: SHIPPED | OUTPATIENT
Start: 2025-03-03

## 2025-03-03 RX ORDER — AMLODIPINE BESYLATE 5 MG/1
5 TABLET ORAL DAILY
Qty: 90 TABLET | Refills: 1 | Status: SHIPPED | OUTPATIENT
Start: 2025-03-03

## 2025-03-03 NOTE — TELEPHONE ENCOUNTER
New Patient    Appointment Scheduling  What office location does the patient prefer?: [place]   What is the reason for the patient's appointment?: cysts on Rt kidney   Have patient records been requested?: in EPIC   If No, are the records showing in Epic:     Appointment Details  Date: 03/12/2025   Time:    12:20 PM   Location:   [place]   Provider:  [place]   Does the appointment need further review? (Reason)      HISTORY  Is the patient having active symptoms? If so, describe symptoms: cysts on Rt kidney   Has the patient had any previous Urologist(s)?: no  Was the patient seen in the ED?: yes on 02/28   Has the patient had any outside testing done?: no   Does patient have Imaging/Lab Results: CT scan 02/28  Have you had Cancer: no     INSURANCE   Have you confirmed Patient's insurance? yes  Is the insurance accepted?  yes  Is the insurance active? yes

## 2025-03-03 NOTE — TELEPHONE ENCOUNTER
Caller: Svitlana Enciso [facility]    Doctor: Dr. Bermeo    Reason for call: Will be faxing over Plan of care  Would like the form sign and faxed back  Fax #[phone]    Call back#:   [phone]

## 2025-03-04 ENCOUNTER — HOSPITAL ENCOUNTER (EMERGENCY)
Facility: HOSPITAL | Age: 80
Discharge: HOME | End: 2025-03-04
Attending: EMERGENCY MEDICINE
Payer: MEDICARE

## 2025-03-04 ENCOUNTER — APPOINTMENT (EMERGENCY)
Dept: RADIOLOGY | Facility: HOSPITAL | Age: 80
End: 2025-03-04
Payer: MEDICARE

## 2025-03-04 VITALS
HEART RATE: 76 BPM | DIASTOLIC BLOOD PRESSURE: 80 MMHG | SYSTOLIC BLOOD PRESSURE: 139 MMHG | TEMPERATURE: 97.5 F | RESPIRATION RATE: 20 BRPM | OXYGEN SATURATION: 99 %

## 2025-03-04 DIAGNOSIS — M25.532 LEFT WRIST PAIN: Primary | ICD-10-CM

## 2025-03-04 DIAGNOSIS — M25.522 LEFT ELBOW PAIN: ICD-10-CM

## 2025-03-04 PROCEDURE — 99284 EMERGENCY DEPT VISIT MOD MDM: CPT | Performed by: EMERGENCY MEDICINE

## 2025-03-04 PROCEDURE — 99283 EMERGENCY DEPT VISIT LOW MDM: CPT

## 2025-03-04 PROCEDURE — 73070 X-RAY EXAM OF ELBOW: CPT

## 2025-03-04 RX ORDER — KETOROLAC TROMETHAMINE 30 MG/ML
30 INJECTION, SOLUTION INTRAMUSCULAR; INTRAVENOUS ONCE
Status: DISCONTINUED | OUTPATIENT
Start: 2025-03-04 | End: 2025-03-04

## 2025-03-04 RX ORDER — PREDNISONE 20 MG/1
40 TABLET ORAL ONCE
Status: COMPLETED | OUTPATIENT
Start: 2025-03-04 | End: 2025-03-04

## 2025-03-04 RX ORDER — PREDNISONE 20 MG/1
40 TABLET ORAL DAILY
Qty: 8 TABLET | Refills: 0 | Status: SHIPPED | OUTPATIENT
Start: 2025-03-04 | End: 2025-03-08

## 2025-03-04 RX ADMIN — PREDNISONE 40 MG: 20 TABLET ORAL at 10:28

## 2025-03-04 NOTE — DISCHARGE INSTRUCTIONS
Take prednisone once daily, tomorrow is first day. Can continue with oxycodone. Follow up with orthopedics and your primary care doctor

## 2025-03-04 NOTE — ED PROVIDER NOTES
Time reflects when diagnosis was documented in both MDM as applicable and the Disposition within this note       Time User Action Codes Description Comment    3/4/2025  9:51 AM Anne Marie Nava [M25.532] Left wrist pain     3/4/2025  9:51 AM Anne Marie Nava [M25.522] Left elbow pain           ED Disposition       ED Disposition   Discharge    Condition   Stable    Date/Time   Tue Mar 4, 2025 10:39 AM    Comment   Radha Frank discharge to home/self care.                   Assessment & Plan       Medical Decision Making  Amount and/or Complexity of Data Reviewed  Radiology: ordered.    Risk  Prescription drug management.      Elbow x-ray shows no acute fracture or dislocation per my read, clinically no signs of SA.  Will give steroid for possible rheumatological process given multi joint involvement.  Will also give sling.       Medications   predniSONE tablet 40 mg (40 mg Oral Given 3/4/25 1028)       ED Risk Strat Scores                            SBIRT 22yo+      Flowsheet Row Most Recent Value   Initial Alcohol Screen: US AUDIT-C     1. How often do you have a drink containing alcohol? 0 Filed at: 03/04/2025 1001   2. How many drinks containing alcohol do you have on a typical day you are drinking?  0 Filed at: 03/04/2025 1001   3b. FEMALE Any Age, or MALE 65+: How often do you have 4 or more drinks on one occassion? 0 Filed at: 03/04/2025 1001   Audit-C Score 0 Filed at: 03/04/2025 1001   ALEKSEY: How many times in the past year have you...    Used an illegal drug or used a prescription medication for non-medical reasons? Never Filed at: 03/04/2025 1001                            History of Present Illness       Chief Complaint   Patient presents with    Wrist Pain     Pt presents for c/o L wrist pain radiating into L elbow, was seen for the same this weekend.        Past Medical History:   Diagnosis Date    Allergic     Arthritis     Diabetes mellitus (HCC)     Diverticulitis of colon     GERD (gastroesophageal  reflux disease)     Headache(784.0)     HNP (herniated nucleus pulposus), lumbar     Hyperlipidemia     Hypertension     IBS (irritable bowel syndrome)     Osteoarthritis       Past Surgical History:   Procedure Laterality Date    PARATHYROID GLAND SURGERY      resolved 05/12    PA ESOPHAGOGASTRODUODENOSCOPY TRANSORAL DIAGNOSTIC N/A 04/19/2017    Procedure: EGD AND COLONOSCOPY;  Surgeon: Froylan Carbone III, MD;  Location: [facility];  Service: Gastroenterology    THYROID SURGERY      UMBILICAL HERNIA REPAIR        Family History   Problem Relation Age of Onset    Breast cancer Mother     Diabetes Mother     Heart defect Brother     Arthritis Family     Hypertension Family     No Known Problems Father     No Known Problems Brother       Social History     Tobacco Use    Smoking status: Never    Smokeless tobacco: Never   Vaping Use    Vaping status: Never Used   Substance Use Topics    Alcohol use: No    Drug use: No      E-Cigarette/Vaping    E-Cigarette Use Never User       E-Cigarette/Vaping Substances    Nicotine No     THC No     CBD No     Flavoring No     Other No     Unknown No       I have reviewed and agree with the history as documented.     HPI  79-year-old female presents with left wrist pain in addition to left elbow pain.  She states that she was seen in the ED for wrist pain/swelling few days ago, x-ray was negative, started on oxycodone which gives her some improvement.  She reports that now she feels pain in her left elbow.  Denies any trauma.  She has not followed up with her primary care doctor yet.  No redness, fevers.  Review of Systems   Constitutional:  Negative for chills and fever.   HENT:  Negative for dental problem and ear pain.    Eyes:  Negative for pain and redness.   Respiratory:  Negative for cough and shortness of breath.    Cardiovascular:  Negative for chest pain and palpitations.   Gastrointestinal:  Negative for abdominal pain and nausea.   Endocrine: Negative for polydipsia  and polyphagia.   Genitourinary:  Negative for dysuria and frequency.   Musculoskeletal:  Positive for arthralgias. Negative for joint swelling.   Skin:  Negative for color change and rash.   Neurological:  Negative for dizziness and headaches.   Psychiatric/Behavioral:  Negative for behavioral problems and confusion.    All other systems reviewed and are negative.          Objective       ED Triage Vitals [03/04/25 0912]   Temperature Pulse Blood Pressure Respirations SpO2 Patient Position - Orthostatic VS   97.5 °F (36.4 °C) 76 139/80 20 99 % Sitting      Temp Source Heart Rate Source BP Location FiO2 (%) Pain Score    Temporal Monitor Left arm -- --      Vitals      Date and Time Temp Pulse SpO2 Resp BP Pain Score FACES Pain Rating User   03/04/25 0912 97.5 °F (36.4 °C) 76 99 % 20 139/80 -- -- GP            Physical Exam  Vitals and nursing note reviewed.   Constitutional:       General: She is not in acute distress.  HENT:      Head: Normocephalic and atraumatic.      Right Ear: External ear normal.      Left Ear: External ear normal.      Nose: Nose normal.   Eyes:      General: No scleral icterus.  Cardiovascular:      Rate and Rhythm: Normal rate.   Pulmonary:      Effort: Pulmonary effort is normal. No respiratory distress.   Abdominal:      General: There is no distension.   Musculoskeletal:         General: No deformity. Normal range of motion.      Comments: L wrist normal ROM, mild lateral TTP and scant swelling, radial pulse 2+  L elbow normal ROM, +TTP no swelling   Skin:     Findings: No rash.   Neurological:      General: No focal deficit present.      Mental Status: She is alert.      Gait: Gait normal.   Psychiatric:         Mood and Affect: Mood normal.         Results Reviewed       None            XR elbow 2 views LEFT   Final Interpretation by Chace Paiz MD (03/04 1211)         1. Small elbow joint effusion without evident fracture or malalignment. Specifically, no convincing radial  head/neck fracture demonstrated.      If symptoms persist, recommend repeat radiographs in 10-14 days to assess for evidence of healing occult fracture.      2. Mild olecranon soft tissue swelling.      Computerized Assisted Algorithm (CAA) may have been used to analyze all applicable images.      Note: The study was marked in EPIC for significant notification. Imaging follow-up reminder notification was scheduled in the electronic medical record.         Resident: Yonathan Bueno I, the attending radiologist, have reviewed the images and agree with the final report above.      Workstation performed: [id number]             Procedures    ED Medication and Procedure Management   Prior to Admission Medications   Prescriptions Last Dose Informant Patient Reported? Taking?   amLODIPine (NORVASC) 5 mg tablet   No No   Sig: TAKE 1 TABLET (5 MG TOTAL) BY MOUTH DAILY.   chlordiazepoxide-clidinium (LIBRAX) 5-2.5 mg per capsule   No No   Sig: Take 1 capsule by mouth 4 (four) times a day (before meals and at bedtime)   Patient not taking: Reported on 2/4/2025   clotrimazole-betamethasone (LOTRISONE) 1-0.05 % cream  Self No No   Sig: Apply topically 2 (two) times a day   dicyclomine (BENTYL) 10 mg capsule  Self No No   Sig: TAKE 1 CAPSULE (10 MG TOTAL) BY MOUTH 3 TIMES A DAY AS NEEDED FOR ABDOMINAL PAIN   glucose blood (Accu-Chek Guide Test) test strip   No No   Sig: CHECK SUGAR DAILY   hydrocortisone (ANUSOL-HC) 2.5 % rectal cream  Self No No   Sig: Apply topically 2 (two) times a day   levocetirizine (XYZAL) 5 MG tablet  Self Yes No   Sig: Take 5 mg by mouth as needed    lidocaine (Lidoderm) 5 %   No No   Sig: Apply 1 patch topically over 12 hours daily Remove & Discard patch within 12 hours or as directed by MD   lisinopril (ZESTRIL) 10 mg tablet   No No   Sig: TAKE 1 TABLET BY MOUTH EVERY DAY   meloxicam (MOBIC) 15 mg tablet  Self No No   Sig: Take 1 tablet (15 mg total) by mouth daily as needed for moderate pain  USE THIS MEDICATION SPARINGLY.   metFORMIN (GLUCOPHAGE-XR) 500 mg 24 hr tablet  Self No No   Sig: Take 2 tablets (1,000 mg total) by mouth 2 (two) times a day with meals   oxyCODONE (Roxicodone) 5 immediate release tablet   No No   Sig: Take 1 tablet (5 mg total) by mouth every 4 (four) hours as needed for moderate pain for up to 10 days Max Daily Amount: 30 mg   oxyCODONE (Roxicodone) 5 immediate release tablet   No No   Sig: Take 1 tablet (5 mg total) by mouth every 6 (six) hours as needed for severe pain for up to 10 days Max Daily Amount: 20 mg   pantoprazole (PROTONIX) 40 mg tablet  Self No No   Sig: Take 1 tablet (40 mg total) by mouth daily   rosuvastatin (CRESTOR) 40 MG tablet   No No   Sig: Take 1 tablet (40 mg total) by mouth daily      Facility-Administered Medications: None     Discharge Medication List as of 3/4/2025 10:40 AM        START taking these medications    Details   predniSONE 20 mg tablet Take 2 tablets (40 mg total) by mouth daily for 4 days, Starting Tue 3/4/2025, Until Sat 3/8/2025, Normal           CONTINUE these medications which have NOT CHANGED    Details   amLODIPine (NORVASC) 5 mg tablet TAKE 1 TABLET (5 MG TOTAL) BY MOUTH DAILY., Starting Mon 3/3/2025, Normal      chlordiazepoxide-clidinium (LIBRAX) 5-2.5 mg per capsule Take 1 capsule by mouth 4 (four) times a day (before meals and at bedtime), Starting Tue 9/24/2024, Normal      clotrimazole-betamethasone (LOTRISONE) 1-0.05 % cream Apply topically 2 (two) times a day, Starting Thu 6/13/2024, Normal      dicyclomine (BENTYL) 10 mg capsule TAKE 1 CAPSULE (10 MG TOTAL) BY MOUTH 3 TIMES A DAY AS NEEDED FOR ABDOMINAL PAIN, Normal      glucose blood (Accu-Chek Guide Test) test strip CHECK SUGAR DAILY, Normal      hydrocortisone (ANUSOL-HC) 2.5 % rectal cream Apply topically 2 (two) times a day, Starting Tue 4/18/2023, Normal      levocetirizine (XYZAL) 5 MG tablet Take 5 mg by mouth as needed , Historical Med      lidocaine (Lidoderm) 5  % Apply 1 patch topically over 12 hours daily Remove & Discard patch within 12 hours or as directed by MD, Starting Fri 2/28/2025, Normal      lisinopril (ZESTRIL) 10 mg tablet TAKE 1 TABLET BY MOUTH EVERY DAY, Starting Mon 3/3/2025, Normal      meloxicam (MOBIC) 15 mg tablet Take 1 tablet (15 mg total) by mouth daily as needed for moderate pain USE THIS MEDICATION SPARINGLY., Starting Mon 7/29/2024, Normal      metFORMIN (GLUCOPHAGE-XR) 500 mg 24 hr tablet Take 2 tablets (1,000 mg total) by mouth 2 (two) times a day with meals, Starting Thu 6/13/2024, Normal      !! oxyCODONE (Roxicodone) 5 immediate release tablet Take 1 tablet (5 mg total) by mouth every 4 (four) hours as needed for moderate pain for up to 10 days Max Daily Amount: 30 mg, Starting Fri 2/28/2025, Until Mon 3/10/2025 at 2359, Normal      !! oxyCODONE (Roxicodone) 5 immediate release tablet Take 1 tablet (5 mg total) by mouth every 6 (six) hours as needed for severe pain for up to 10 days Max Daily Amount: 20 mg, Starting Sun 3/2/2025, Until Wed 3/12/2025 at 2359, Normal      pantoprazole (PROTONIX) 40 mg tablet Take 1 tablet (40 mg total) by mouth daily, Starting Mon 6/3/2024, Normal      rosuvastatin (CRESTOR) 40 MG tablet Take 1 tablet (40 mg total) by mouth daily, Starting Tue 2/4/2025, Normal       !! - Potential duplicate medications found. Please discuss with provider.        No discharge procedures on file.  ED SEPSIS DOCUMENTATION   Time reflects when diagnosis was documented in both MDM as applicable and the Disposition within this note       Time User Action Codes Description Comment    3/4/2025  9:51 AM Anne Marie Nava [M25.532] Left wrist pain     3/4/2025  9:51 AM Anne Marie Nava [M25.522] Left elbow pain                  Anne Marie Nava MD  03/04/25 2611

## 2025-03-04 NOTE — ED PROVIDER NOTES
Time reflects when diagnosis was documented in both MDM as applicable and the Disposition within this note       Time User Action Codes Description Comment    2/28/2025  4:40 PM Debbie, Nitin Add [R10.9] Flank pain     2/28/2025  4:40 PM Debbie Nitin Add [R10.9] Right flank pain     2/28/2025  4:40 PM Debbie, Nitin Modify [R10.9] Right flank pain     2/28/2025  4:40 PM Debbie Nitin Remove [R10.9] Flank pain           ED Disposition       ED Disposition   Discharge    Condition   Stable    Date/Time   Fri Feb 28, 2025  4:40 PM    Comment   Radha Frank discharge to home/self care.                   Assessment & Plan       Medical Decision Making  This patient presents emergency department with a chief complaint of flank pain.    Differential diagnosis includes but not limited to: Urinary tract infection, ureteral stone, pyelonephritis, kidney cyst, kidney abscess, musculoskeletal pain, shingles, STI, cystitis, colitis.       Amount and/or Complexity of Data Reviewed  Radiology: ordered and independent interpretation performed. Decision-making details documented in ED Course.    Risk  Prescription drug management.        ED Course as of 03/03/25 2230   Fri Feb 28, 2025   1639 CT abdomen pelvis wo contrast   Mon Mar 03, 2025   2229 Leukocytes, UA(!): Trace   2229 Nitrite, UA: Negative   2229 Sodium: 138   2229 Potassium: 4.0   2229 BUN: 17   2229 Creatinine(!): 1.43   2229 WBC: 6.06   2229 Hemoglobin: 12.7   2230 Hematocrit: 40.0       Medications - No data to display    ED Risk Strat Scores                            SBIRT 20yo+      Flowsheet Row Most Recent Value   Initial Alcohol Screen: US AUDIT-C     1. How often do you have a drink containing alcohol? 0 Filed at: 02/28/2025 1546   2. How many drinks containing alcohol do you have on a typical day you are drinking?  0 Filed at: 02/28/2025 1547   3b. FEMALE Any Age, or MALE 65+: How often do you have 4 or more drinks on one occassion? 0 Filed at: 02/28/2025 1542    Audit-C Score 0 Filed at: 02/28/2025 6205   ALEKSEY: How many times in the past year have you...    Used an illegal drug or used a prescription medication for non-medical reasons? Never Filed at: 02/28/2025 3248                            History of Present Illness       Chief Complaint   Patient presents with    Flank Pain     Right sided flank pain for three days, denies any nausea or vomiting, reports urinary frequency.        Past Medical History:   Diagnosis Date    Allergic     Arthritis     Diabetes mellitus (HCC)     Diverticulitis of colon     GERD (gastroesophageal reflux disease)     Headache(784.0)     HNP (herniated nucleus pulposus), lumbar     Hyperlipidemia     Hypertension     IBS (irritable bowel syndrome)     Osteoarthritis       Past Surgical History:   Procedure Laterality Date    PARATHYROID GLAND SURGERY      resolved 05/12    ME ESOPHAGOGASTRODUODENOSCOPY TRANSORAL DIAGNOSTIC N/A 04/19/2017    Procedure: EGD AND COLONOSCOPY;  Surgeon: Froylan Carbone III, MD;  Location: [facility];  Service: Gastroenterology    THYROID SURGERY      UMBILICAL HERNIA REPAIR        Family History   Problem Relation Age of Onset    Breast cancer Mother     Diabetes Mother     Heart defect Brother     Arthritis Family     Hypertension Family     No Known Problems Father     No Known Problems Brother       Social History     Tobacco Use    Smoking status: Never    Smokeless tobacco: Never   Vaping Use    Vaping status: Never Used   Substance Use Topics    Alcohol use: No    Drug use: No      E-Cigarette/Vaping    E-Cigarette Use Never User       E-Cigarette/Vaping Substances    Nicotine No     THC No     CBD No     Flavoring No     Other No     Unknown No       I have reviewed and agree with the history as documented.     Radha Frank is a 79 y.o.  year old female  Past Medical History:  No date: Allergic  No date: Arthritis  No date: Diabetes mellitus (HCC)  No date: Diverticulitis of colon  No date: GERD  (gastroesophageal reflux disease)  No date: Headache(784.0)  No date: HNP (herniated nucleus pulposus), lumbar  No date: Hyperlipidemia  No date: Hypertension  No date: IBS (irritable bowel syndrome)  No date: Osteoarthritis  Social History    Tobacco Use      Smoking status: Never      Smokeless tobacco: Never    Vaping Use      Vaping status: Never Used    Alcohol use: No    Drug use: No    Patient presents with:  Flank Pain: Right sided flank pain for three days, denies any nausea or vomiting, reports urinary frequency.   Pain minimal at this time  Wax/waning  No radiation                  History provided by:  Patient   used: No    Flank Pain  Associated symptoms: no chest pain, no chills, no cough, no dysuria, no fever, no hematuria, no shortness of breath, no sore throat and no vomiting        Review of Systems   Constitutional:  Negative for chills and fever.   HENT:  Negative for ear pain and sore throat.    Eyes:  Negative for pain and visual disturbance.   Respiratory:  Negative for cough and shortness of breath.    Cardiovascular:  Negative for chest pain and palpitations.   Gastrointestinal:  Negative for abdominal pain and vomiting.   Genitourinary:  Positive for flank pain. Negative for dysuria and hematuria.   Musculoskeletal:  Negative for arthralgias and back pain.   Skin:  Negative for color change and rash.   Neurological:  Negative for seizures and syncope.   All other systems reviewed and are negative.          Objective       ED Triage Vitals [02/28/25 1419]   Temperature Pulse Blood Pressure Respirations SpO2 Patient Position - Orthostatic VS   (!) 97.2 °F (36.2 °C) 73 115/70 18 99 % Sitting      Temp Source Heart Rate Source BP Location FiO2 (%) Pain Score    Temporal Monitor Left arm -- --      Vitals      Date and Time Temp Pulse SpO2 Resp BP Pain Score FACES Pain Rating User   02/28/25 1623 97.6 °F (36.4 °C) 72 98 % 18 112/71 -- -- [place]   02/28/25 1419 97.2 °F (36.2 °C)  73 99 % 18 115/70 -- -- CT            Physical Exam  Vitals and nursing note reviewed.   Constitutional:       General: She is not in acute distress.     Appearance: Normal appearance. She is well-developed and normal weight.   HENT:      Head: Normocephalic and atraumatic.   Eyes:      Conjunctiva/sclera: Conjunctivae normal.   Cardiovascular:      Rate and Rhythm: Normal rate and regular rhythm.      Heart sounds: No murmur heard.  Pulmonary:      Effort: Pulmonary effort is normal. No respiratory distress.      Breath sounds: Normal breath sounds.   Abdominal:      Palpations: Abdomen is soft.      Tenderness: There is no abdominal tenderness.   Musculoskeletal:         General: No swelling.      Cervical back: Neck supple.   Skin:     General: Skin is warm and dry.      Capillary Refill: Capillary refill takes less than 2 seconds.   Neurological:      General: No focal deficit present.      Mental Status: She is alert and oriented to person, place, and time.   Psychiatric:         Mood and Affect: Mood normal.         Thought Content: Thought content normal.         Judgment: Judgment normal.         Results Reviewed       Procedure Component Value Units Date/Time    Urine Microscopic [[id number]]  (Abnormal) Collected: 02/28/25 1649    Lab Status: Final result Specimen: Urine, Clean Catch Updated: 02/28/25 1708     RBC, UA 1-2 /hpf      WBC, UA 4-10 /hpf      Epithelial Cells Occasional /hpf      Bacteria, UA None Seen /hpf      MUCUS THREADS Occasional     Hyaline Casts, UA 0-3 /lpf     UA w Reflex to Microscopic w Reflex to Culture [[id number]]  (Abnormal) Collected: 02/28/25 1649    Lab Status: Final result Specimen: Urine, Clean Catch Updated: 02/28/25 1659     Color, UA Light Yellow     Clarity, UA Clear     Specific Gravity, UA 1.021     pH, UA 5.5     Leukocytes, UA Trace     Nitrite, UA Negative     Protein, UA 70 (1+) mg/dl      Glucose, UA Negative mg/dl      Ketones, UA Negative mg/dl       Urobilinogen, UA <2.0 mg/dl      Bilirubin, UA Negative     Occult Blood, UA Small    Comprehensive metabolic panel [[id number]]  (Abnormal) Collected: 02/28/25 1422    Lab Status: Final result Specimen: Blood from Arm, Right Updated: 02/28/25 1444     Sodium 138 mmol/L      Potassium 4.0 mmol/L      Chloride 105 mmol/L      CO2 26 mmol/L      ANION GAP 7 mmol/L      BUN 17 mg/dL      Creatinine 1.43 mg/dL      Glucose 232 mg/dL      Calcium 9.9 mg/dL      AST 13 U/L      ALT 10 U/L      Alkaline Phosphatase 120 U/L      Total Protein 6.9 g/dL      Albumin 3.7 g/dL      Total Bilirubin 0.37 mg/dL      eGFR 34 ml/min/1.73sq m     Narrative:      National Kid[employer] Disease Foundation guidelines for Chronic Kidney Disease (CKD):     Stage 1 with normal or high GFR (GFR > 90 mL/min/1.73 square meters)    Stage 2 Mild CKD (GFR = 60-89 mL/min/1.73 square meters)    Stage 3A Moderate CKD (GFR = 45-59 mL/min/1.73 square meters)    Stage 3B Moderate CKD (GFR = 30-44 mL/min/1.73 square meters)    Stage 4 Severe CKD (GFR = 15-29 mL/min/1.73 square meters)    Stage 5 End Stage CKD (GFR <15 mL/min/1.73 square meters)  Note: GFR calculation is accurate only with a steady state creatinine    Lipase [[id number]]  (Normal) Collected: 02/28/25 1422    Lab Status: Final result Specimen: Blood from Arm, Right Updated: 02/28/25 1444     Lipase 38 u/L     CBC and differential [[id number]] Collected: 02/28/25 1422    Lab Status: Final result Specimen: Blood from Arm, Right Updated: 02/28/25 1428     WBC 6.06 Thousand/uL      RBC 4.45 Million/uL      Hemoglobin 12.7 g/dL      Hematocrit 40.0 %      MCV 90 fL      MCH 28.5 pg      MCHC 31.8 g/dL      RDW 13.1 %      MPV 9.5 fL      Platelets 237 Thousands/uL      nRBC 0 /100 WBCs      Segmented % 49 %      Immature Grans % 0 %      Lymphocytes % 33 %      Monocytes % 12 %      Eosinophils Relative 5 %      Basophils Relative 1 %      Absolute Neutrophils 3.02 Thousands/µL      Absolute Immature  Grans 0.01 Thousand/uL      Absolute Lymphocytes 1.98 Thousands/µL      Absolute Monocytes 0.72 Thousand/µL      Eosinophils Absolute 0.29 Thousand/µL      Basophils Absolute 0.04 Thousands/µL             CT abdomen pelvis wo contrast   ED Interpretation by Nitin Lewis MD (02/28 1618)   2 fairly large cysts on the R kidney       Final Interpretation by Serge Perez MD (02/28 1631)      No acute findings in the abdomen or pelvis within the limits of unenhanced technique.      Large volume colonic fecal burden.      Workstation performed: [id number]             Procedures    ED Medication and Procedure Management   Prior to Admission Medications   Prescriptions Last Dose Informant Patient Reported? Taking?   chlordiazepoxide-clidinium (LIBRAX) 5-2.5 mg per capsule   No No   Sig: Take 1 capsule by mouth 4 (four) times a day (before meals and at bedtime)   Patient not taking: Reported on 2/4/2025   clotrimazole-betamethasone (LOTRISONE) 1-0.05 % cream  Self No No   Sig: Apply topically 2 (two) times a day   dicyclomine (BENTYL) 10 mg capsule  Self No No   Sig: TAKE 1 CAPSULE (10 MG TOTAL) BY MOUTH 3 TIMES A DAY AS NEEDED FOR ABDOMINAL PAIN   hydrocortisone (ANUSOL-HC) 2.5 % rectal cream  Self No No   Sig: Apply topically 2 (two) times a day   levocetirizine (XYZAL) 5 MG tablet  Self Yes No   Sig: Take 5 mg by mouth as needed    meloxicam (MOBIC) 15 mg tablet  Self No No   Sig: Take 1 tablet (15 mg total) by mouth daily as needed for moderate pain USE THIS MEDICATION SPARINGLY.   metFORMIN (GLUCOPHAGE-XR) 500 mg 24 hr tablet  Self No No   Sig: Take 2 tablets (1,000 mg total) by mouth 2 (two) times a day with meals   pantoprazole (PROTONIX) 40 mg tablet  Self No No   Sig: Take 1 tablet (40 mg total) by mouth daily   rosuvastatin (CRESTOR) 40 MG tablet   No No   Sig: Take 1 tablet (40 mg total) by mouth daily      Facility-Administered Medications: None     Discharge Medication List as of 2/28/2025  4:41 PM         CONTINUE these medications which have NOT CHANGED    Details   chlordiazepoxide-clidinium (LIBRAX) 5-2.5 mg per capsule Take 1 capsule by mouth 4 (four) times a day (before meals and at bedtime), Starting Tue 9/24/2024, Normal      clotrimazole-betamethasone (LOTRISONE) 1-0.05 % cream Apply topically 2 (two) times a day, Starting Thu 6/13/2024, Normal      dicyclomine (BENTYL) 10 mg capsule TAKE 1 CAPSULE (10 MG TOTAL) BY MOUTH 3 TIMES A DAY AS NEEDED FOR ABDOMINAL PAIN, Normal      hydrocortisone (ANUSOL-HC) 2.5 % rectal cream Apply topically 2 (two) times a day, Starting Tue 4/18/2023, Normal      levocetirizine (XYZAL) 5 MG tablet Take 5 mg by mouth as needed , Historical Med      meloxicam (MOBIC) 15 mg tablet Take 1 tablet (15 mg total) by mouth daily as needed for moderate pain USE THIS MEDICATION SPARINGLY., Starting Mon 7/29/2024, Normal      metFORMIN (GLUCOPHAGE-XR) 500 mg 24 hr tablet Take 2 tablets (1,000 mg total) by mouth 2 (two) times a day with meals, Starting Thu 6/13/2024, Normal      pantoprazole (PROTONIX) 40 mg tablet Take 1 tablet (40 mg total) by mouth daily, Starting Mon 6/3/2024, Normal      rosuvastatin (CRESTOR) 40 MG tablet Take 1 tablet (40 mg total) by mouth daily, Starting Tue 2/4/2025, Normal      Accu-Chek Guide test strip CHECK SUGAR DAILY, Normal      amLODIPine (NORVASC) 5 mg tablet TAKE 1 TABLET (5 MG TOTAL) BY MOUTH DAILY., Starting Wed 4/10/2024, Normal      lisinopril (ZESTRIL) 10 mg tablet TAKE 1 TABLET BY MOUTH EVERY DAY, Starting Wed 4/10/2024, Normal           No discharge procedures on file.  ED SEPSIS DOCUMENTATION   Time reflects when diagnosis was documented in both MDM as applicable and the Disposition within this note       Time User Action Codes Description Comment    2/28/2025  4:40 PM Nitin Lewis [R10.9] Flank pain     2/28/2025  4:40 PM Nitin Lewis [R10.9] Right flank pain     2/28/2025  4:40 PM Nitin Lewis Modify [R10.9] Right flank pain      2/28/2025  4:40 PM Nitin Lewis Remove [R10.9] Flank pain                  Nitin Lewis MD  03/03/25 7027

## 2025-03-05 ENCOUNTER — RESULTS FOLLOW-UP (OUTPATIENT)
Dept: EMERGENCY DEPT | Facility: HOSPITAL | Age: 80
End: 2025-03-05

## 2025-03-05 ENCOUNTER — HOSPITAL ENCOUNTER (OUTPATIENT)
Dept: NON INVASIVE DIAGNOSTICS | Facility: CLINIC | Age: 80
Discharge: HOME | End: 2025-03-05
Payer: MEDICARE

## 2025-03-05 VITALS
DIASTOLIC BLOOD PRESSURE: 80 MMHG | HEIGHT: 65 IN | SYSTOLIC BLOOD PRESSURE: 139 MMHG | BODY MASS INDEX: 29.66 KG/M2 | WEIGHT: 178 LBS | HEART RATE: 50 BPM

## 2025-03-05 DIAGNOSIS — R01.1 MURMUR, CARDIAC: ICD-10-CM

## 2025-03-05 DIAGNOSIS — I10 HYPERTENSION, BENIGN: ICD-10-CM

## 2025-03-05 LAB
AORTIC ROOT: 2.9 CM
ASCENDING AORTA: 3 CM
AV LVOT MEAN GRADIENT: 1 MMHG
AV LVOT PEAK GRADIENT: 3 MMHG
BSA FOR ECHO PROCEDURE: 1.87 M2
DOP CALC LVOT PEAK VEL VTI: 19.24 CM
DOP CALC LVOT PEAK VEL: 0.83 M/S
E WAVE DECELERATION TIME: 240 MS
E/A RATIO: 0.63
FRACTIONAL SHORTENING: 35 (ref 28–44)
INTERVENTRICULAR SEPTUM IN DIASTOLE (PARASTERNAL SHORT AXIS VIEW): 1.3 CM
INTERVENTRICULAR SEPTUM: 1.3 CM (ref 0.6–1.1)
LAAS-AP2: 12.6 CM2
LAAS-AP4: 18.3 CM2
LEFT ATRIUM SIZE: 3.4 CM
LEFT ATRIUM VOLUME (MOD BIPLANE): 35 ML
LEFT ATRIUM VOLUME INDEX (MOD BIPLANE): 18.7 ML/M2
LEFT INTERNAL DIMENSION IN SYSTOLE: 2.8 CM (ref 2.1–4)
LEFT VENTRICULAR INTERNAL DIMENSION IN DIASTOLE: 4.3 CM (ref 3.5–6)
LEFT VENTRICULAR POSTERIOR WALL IN END DIASTOLE: 1.1 CM
LEFT VENTRICULAR STROKE VOLUME: 53 ML
LV EF US.2D.A4C+ESTIMATED: 61 %
LVSV (TEICH): 53 ML
MV E'TISSUE VEL-SEP: 5 CM/S
MV PEAK A VEL: 1.13 M/S
MV PEAK E VEL: 71 CM/S
MV STENOSIS PRESSURE HALF TIME: 70 MS
MV VALVE AREA P 1/2 METHOD: 3.1
RIGHT ATRIUM AREA SYSTOLE A4C: 10.2 CM2
RIGHT VENTRICLE ID DIMENSION: 2.1 CM
SL CV LEFT ATRIUM LENGTH A2C: 5.4 CM
SL CV LV EF: 60
SL CV PED ECHO LEFT VENTRICLE DIASTOLIC VOLUME (MOD BIPLANE) 2D: 82 ML
SL CV PED ECHO LEFT VENTRICLE SYSTOLIC VOLUME (MOD BIPLANE) 2D: 29 ML
TRICUSPID ANNULAR PLANE SYSTOLIC EXCURSION: 1.9 CM

## 2025-03-05 PROCEDURE — 93306 TTE W/DOPPLER COMPLETE: CPT | Performed by: INTERNAL MEDICINE

## 2025-03-05 PROCEDURE — 93306 TTE W/DOPPLER COMPLETE: CPT

## 2025-03-06 ENCOUNTER — RESULTS FOLLOW-UP (OUTPATIENT)
Dept: CARDIOLOGY CLINIC | Facility: CLINIC | Age: 80
End: 2025-03-06

## 2025-03-11 ENCOUNTER — OFFICE VISIT (OUTPATIENT)
Dept: INTERNAL MEDICINE CLINIC | Facility: CLINIC | Age: 80
End: 2025-03-11
Payer: MEDICARE

## 2025-03-11 VITALS
BODY MASS INDEX: 29.56 KG/M2 | DIASTOLIC BLOOD PRESSURE: 76 MMHG | SYSTOLIC BLOOD PRESSURE: 110 MMHG | WEIGHT: 177.4 LBS | OXYGEN SATURATION: 100 % | HEIGHT: 65 IN | RESPIRATION RATE: 20 BRPM | HEART RATE: 52 BPM

## 2025-03-11 DIAGNOSIS — N28.1 BILATERAL RENAL CYSTS: ICD-10-CM

## 2025-03-11 DIAGNOSIS — E11.9 TYPE 2 DIABETES MELLITUS WITHOUT COMPLICATION, WITHOUT LONG-TERM CURRENT USE OF INSULIN (HCC): ICD-10-CM

## 2025-03-11 DIAGNOSIS — M25.532 LEFT WRIST PAIN: Primary | ICD-10-CM

## 2025-03-11 PROCEDURE — 99214 OFFICE O/P EST MOD 30 MIN: CPT

## 2025-03-11 PROCEDURE — G2211 COMPLEX E/M VISIT ADD ON: HCPCS

## 2025-03-11 NOTE — PROGRESS NOTES
Name: Radha Frank      : 1945      MRN: [id number]  Encounter Provider: TRACY Vasquez  Encounter Date: 3/11/2025   Encounter department: [facility] INTERNAL MEDICINE [place]  :  Assessment & Plan  Left wrist pain  Xray shows evidence of degenerative changes.  This continues to bother the patient from time to time.  It was a little sore this morning for her.  No evidence of any swelling, full range of motion.  Will give Voltaren gel for pain control when sore.  Patient is not interested in physical therapy at this time.  Orders:  •  Diclofenac Sodium (VOLTAREN) 1 %; Apply 2 g topically 4 (four) times a day    Bilateral renal cysts  CT abdomen pelvis on  showed simple bilateral renal cysts measuring up to 3.7 cm in the right upper poles additional subcentimeter hypoattenuating renal lesions too small to characterize but statistically likely benign, did not warrant any follow-up.  Discussed this with patient.       Type 2 diabetes mellitus without complication, without long-term current use of insulin ([facility])  Patient will follow-up with PCP in May for diabetes.  Encouraged her to get her blood work done prior to coming to visit, patient aware she needs to fast for labs.  Currently taking metformin 1000 mg twice daily.  Lab Results   Component Value Date    HGBA1C 7.5 (H) 10/25/2024                   History of Present Illness {?Quick Links Encounters * My Last Note * Last Note in Specialty * Snapshot * Since Last Visit * History :29534}  Radha presented to the emergency room on  with complaints of flank pain.  She presented to the ED on 3/2 with complaints of chest pain as well as left wrist pain.  She presented to the ED on 3/4 with complaints of left wrist pain as well as elbow pain.  She has had a CAT scan of her chest abdomen pelvis as well as x-rays of the left wrist and elbow.  No abnormality seen except for a large colonic burden of stool.    She is here today for follow-up.   "Since her visits to the emergency room she has recalled that she had had fallen onto her left side in the bathroom. A few days after that she started feeling the pain in her wrist which then moved to her left elbow.  The x-ray of her left elbow showed some effusion and soft tissue swelling.  No evidence of fracture.  Left wrist showed no evidence of fracture, only degenerative changes.  CT of her abdomen and pelvis showed significant colonic stool burden, patient recalls that she had not had a bowel movement for quite a few days around that time due to not feeling well.  She is now having daily bowel movements, her pain has subsided.      Review of Systems   Constitutional:  Negative for chills and fever.   HENT:  Negative for congestion, ear pain, rhinorrhea and sore throat.    Eyes:  Negative for pain and visual disturbance.   Respiratory:  Negative for cough, chest tightness and shortness of breath.    Cardiovascular:  Negative for chest pain, palpitations and leg swelling.   Gastrointestinal:  Negative for abdominal pain, constipation, diarrhea, nausea and vomiting.   Endocrine: Negative.    Genitourinary:  Negative for dysuria, frequency, hematuria and urgency.   Musculoskeletal:  Positive for arthralgias. Negative for back pain.   Skin:  Negative for color change and rash.   Allergic/Immunologic: Negative.    Neurological:  Negative for dizziness, seizures, syncope and headaches.   Hematological: Negative.    Psychiatric/Behavioral: Negative.     All other systems reviewed and are negative.      Objective {?Quick Links Trend Vitals * Enter New Vitals * Results Review * Timeline (Adult) * Labs * Imaging * Cardiology * Procedures * Lung Cancer Screening * Surgical eConsent :58977}  /76 (BP Location: Left arm, Patient Position: Sitting, Cuff Size: Adult)   Pulse (!) 52   Resp 20   Ht 5' 4.5\" (1.638 m)   Wt 80.5 kg (177 lb 6.4 oz)   SpO2 100%   BMI 29.98 kg/m²      Physical Exam  Vitals and nursing " note reviewed.   Constitutional:       General: She is not in acute distress.     Appearance: She is well-developed.   Cardiovascular:      Rate and Rhythm: Normal rate and regular rhythm.      Pulses: Normal pulses.      Heart sounds: Normal heart sounds. No murmur heard.  Pulmonary:      Effort: Pulmonary effort is normal. No respiratory distress.      Breath sounds: Normal breath sounds.   Abdominal:      General: Bowel sounds are normal.      Palpations: Abdomen is soft.      Tenderness: There is no abdominal tenderness.   Musculoskeletal:         General: No swelling. Normal range of motion.      Left wrist: Tenderness present.      Cervical back: Normal range of motion and neck supple.   Skin:     General: Skin is warm and dry.      Capillary Refill: Capillary refill takes less than 2 seconds.   Neurological:      Mental Status: She is alert and oriented to person, place, and time.   Psychiatric:         Mood and Affect: Mood normal.

## 2025-03-11 NOTE — ASSESSMENT & PLAN NOTE
CT abdomen pelvis on 2/28 showed simple bilateral renal cysts measuring up to 3.7 cm in the right upper poles additional subcentimeter hypoattenuating renal lesions too small to characterize but statistically likely benign, did not warrant any follow-up.  Discussed this with patient.

## 2025-03-11 NOTE — ASSESSMENT & PLAN NOTE
Patient will follow-up with PCP in May for diabetes.  Encouraged her to get her blood work done prior to coming to visit, patient aware she needs to fast for labs.  Currently taking metformin 1000 mg twice daily.  Lab Results   Component Value Date    HGBA1C 7.5 (H) 10/25/2024

## 2025-03-11 NOTE — PROGRESS NOTES
Name: Radha Frank      : 1945      MRN: [id number]  Encounter Provider: Lolis Blank PA-C  Encounter Date: 3/12/2025   Encounter department: [facility] UROLOGY [place]  :  Assessment & Plan  Urinary frequency  UA today unable to be provided  PVR today 10 mL  Discussed conservative measures  Discussed trial of OAB medication for night time frequency  Limit fluids after dinner  Discussed every 3-4 hours during day is normal  Follow up in 2 months for recheck   Orders:    POCT Measure PVR    trospium chloride (SANCTURA) 20 mg tablet; Take 1 tablet (20 mg total) by mouth daily    Renal cyst  CT AP wo contrast 25 - bilateral simple renal cysts  Asymptomatic  US in 1 year           History of Present Illness   Radha Frank is a 79 y.o. female who presents as a new patient.     Patient had recent CT as above showing renal cyst. States she does have frequency and urgency during day and night. Denies dysuria, flank pain, gross hematuria, prior  surgical manipulation. States she urinates every 3-4 hours during day and night.     Review of Systems   Constitutional:  Negative for activity change, appetite change, chills and fever.   HENT:  Negative for congestion and trouble swallowing.    Respiratory:  Negative for cough and shortness of breath.    Cardiovascular:  Negative for chest pain, palpitations and leg swelling.   Gastrointestinal:  Negative for abdominal pain, constipation, diarrhea, nausea and vomiting.   Genitourinary:  Negative for difficulty urinating, dysuria, flank pain, frequency, hematuria and urgency.   Musculoskeletal:  Negative for back pain and gait problem.   Skin:  Negative for wound.   Allergic/Immunologic: Negative for immunocompromised state.   Neurological:  Negative for dizziness and syncope.   Hematological:  Does not bruise/bleed easily.   Psychiatric/Behavioral:  Negative for confusion.    All other systems reviewed and are negative.         Objective   BP  "118/80 (BP Location: Left arm, Patient Position: Sitting, Cuff Size: Standard)   Pulse 70   Temp (!) 97.3 °F (36.3 °C) (Tympanic)   Ht 5' 4.5\" (1.638 m)   Wt 80.3 kg (177 lb)   SpO2 99%   BMI 29.91 kg/m²     Physical Exam  Constitutional:       Appearance: Normal appearance.   HENT:      Head: Normocephalic.      Nose: Nose normal.      Mouth/Throat:      Pharynx: Oropharynx is clear.   Eyes:      Pupils: Pupils are equal, round, and reactive to light.   Pulmonary:      Effort: Pulmonary effort is normal.   Musculoskeletal:      Cervical back: Normal range of motion.   Skin:     General: Skin is warm.   Neurological:      General: No focal deficit present.      Mental Status: She is alert and oriented to person, place, and time. Mental status is at baseline.   Psychiatric:         Mood and Affect: Mood normal.         Behavior: Behavior normal.         Thought Content: Thought content normal.         Judgment: Judgment normal.           Results   No results found for: \"PSA\"  Lab Results   Component Value Date    GLUCOSE 88 07/09/2015    CALCIUM 10.0 03/02/2025     07/09/2015    K 3.8 03/02/2025    CO2 26 03/02/2025     03/02/2025    BUN 16 03/02/2025    CREATININE 1.53 (H) 03/02/2025     Lab Results   Component Value Date    WBC 7.84 03/02/2025    HGB 12.9 03/02/2025    HCT 39.4 03/02/2025    MCV 89 03/02/2025     03/02/2025       Office Urine Dip  Recent Results (from the past hour)   POCT Measure PVR    Collection Time: 03/12/25 12:12 PM   Result Value Ref Range    POST-VOID RESIDUAL VOLUME, ML POC 10 mL         " Statement Selected

## 2025-03-12 ENCOUNTER — OFFICE VISIT (OUTPATIENT)
Dept: UROLOGY | Facility: CLINIC | Age: 80
End: 2025-03-12
Payer: MEDICARE

## 2025-03-12 VITALS
WEIGHT: 177 LBS | HEART RATE: 70 BPM | HEIGHT: 65 IN | SYSTOLIC BLOOD PRESSURE: 118 MMHG | OXYGEN SATURATION: 99 % | BODY MASS INDEX: 29.49 KG/M2 | TEMPERATURE: 97.3 F | DIASTOLIC BLOOD PRESSURE: 80 MMHG

## 2025-03-12 DIAGNOSIS — N28.1 RENAL CYST: ICD-10-CM

## 2025-03-12 DIAGNOSIS — R35.0 URINARY FREQUENCY: Primary | ICD-10-CM

## 2025-03-12 LAB — POST-VOID RESIDUAL VOLUME, ML POC: 10 ML

## 2025-03-12 PROCEDURE — 51798 US URINE CAPACITY MEASURE: CPT | Performed by: PHYSICIAN ASSISTANT

## 2025-03-12 PROCEDURE — 99204 OFFICE O/P NEW MOD 45 MIN: CPT | Performed by: PHYSICIAN ASSISTANT

## 2025-03-12 RX ORDER — GABAPENTIN 300 MG/1
300 CAPSULE ORAL 3 TIMES DAILY
COMMUNITY
Start: 2025-02-20

## 2025-03-12 RX ORDER — TROSPIUM CHLORIDE 20 MG/1
20 TABLET, FILM COATED ORAL DAILY
Qty: 90 TABLET | Refills: 3 | Status: SHIPPED | OUTPATIENT
Start: 2025-03-12

## 2025-03-12 NOTE — ASSESSMENT & PLAN NOTE
UA today unable to be provided  PVR today 10 mL  Discussed conservative measures  Discussed trial of OAB medication for night time frequency  Limit fluids after dinner  Discussed every 3-4 hours during day is normal  Follow up in 2 months for recheck   Orders:    POCT Measure PVR    trospium chloride (SANCTURA) 20 mg tablet; Take 1 tablet (20 mg total) by mouth daily

## 2025-03-14 NOTE — TELEPHONE ENCOUNTER
Caller: [facility]     Doctor: Dr. Bermeo     Reason for call: Lorraine called to confirm if we received a  plan of care from 11/21/24.  I reached out to the  staff of the office and will fax it back to [facility] once the dr signs it     Call back#: [phone]

## 2025-03-24 NOTE — TELEPHONE ENCOUNTER
Caller: Svitlana [facility]    Doctor: Dr. Bermeo    Reason for call: Calling back, Zaria johnson has not received the signed plan of care    Call back#:

## 2025-03-25 ENCOUNTER — DOCUMENTATION (OUTPATIENT)
Dept: PAIN MEDICINE | Facility: CLINIC | Age: 80
End: 2025-03-25

## 2025-04-30 RX ORDER — MELOXICAM 15 MG/1
15 TABLET ORAL DAILY PRN
Qty: 15 TABLET | Refills: 0 | OUTPATIENT
Start: 2025-04-30

## 2025-05-02 ENCOUNTER — APPOINTMENT (OUTPATIENT)
Age: 80
End: 2025-05-02
Payer: MEDICARE

## 2025-05-02 DIAGNOSIS — E11.9 TYPE 2 DIABETES MELLITUS WITHOUT COMPLICATION, WITHOUT LONG-TERM CURRENT USE OF INSULIN (HCC): ICD-10-CM

## 2025-05-02 DIAGNOSIS — R35.89 POLYURIA: ICD-10-CM

## 2025-05-02 LAB
ALBUMIN SERPL BCG-MCNC: 3.8 G/DL (ref 3.5–5)
ALP SERPL-CCNC: 121 U/L (ref 34–104)
ALT SERPL W P-5'-P-CCNC: 14 U/L (ref 7–52)
ANION GAP SERPL CALCULATED.3IONS-SCNC: 9 MMOL/L (ref 4–13)
AST SERPL W P-5'-P-CCNC: 16 U/L (ref 13–39)
BACTERIA UR QL AUTO: ABNORMAL /HPF
BASOPHILS # BLD AUTO: 0.04 THOUSANDS/ÂΜL (ref 0–0.1)
BASOPHILS NFR BLD AUTO: 1 % (ref 0–1)
BILIRUB SERPL-MCNC: 0.56 MG/DL (ref 0.2–1)
BILIRUB UR QL STRIP: NEGATIVE
BUN SERPL-MCNC: 21 MG/DL (ref 5–25)
CALCIUM SERPL-MCNC: 9.9 MG/DL (ref 8.4–10.2)
CHLORIDE SERPL-SCNC: 104 MMOL/L (ref 96–108)
CHOLEST SERPL-MCNC: 124 MG/DL (ref ?–200)
CLARITY UR: CLEAR
CO2 SERPL-SCNC: 26 MMOL/L (ref 21–32)
COLOR UR: ABNORMAL
CREAT SERPL-MCNC: 1.57 MG/DL (ref 0.6–1.3)
EOSINOPHIL # BLD AUTO: 0.21 THOUSAND/ÂΜL (ref 0–0.61)
EOSINOPHIL NFR BLD AUTO: 3 % (ref 0–6)
ERYTHROCYTE [DISTWIDTH] IN BLOOD BY AUTOMATED COUNT: 14.1 % (ref 11.6–15.1)
GFR SERPL CREATININE-BSD FRML MDRD: 31 ML/MIN/1.73SQ M
GLUCOSE P FAST SERPL-MCNC: 260 MG/DL (ref 65–99)
GLUCOSE UR STRIP-MCNC: ABNORMAL MG/DL
HCT VFR BLD AUTO: 46.7 % (ref 34.8–46.1)
HDLC SERPL-MCNC: 47 MG/DL
HGB BLD-MCNC: 14.8 G/DL (ref 11.5–15.4)
HGB UR QL STRIP.AUTO: ABNORMAL
IMM GRANULOCYTES # BLD AUTO: 0.02 THOUSAND/UL (ref 0–0.2)
IMM GRANULOCYTES NFR BLD AUTO: 0 % (ref 0–2)
KETONES UR STRIP-MCNC: NEGATIVE MG/DL
LDLC SERPL CALC-MCNC: 56 MG/DL (ref 0–100)
LEUKOCYTE ESTERASE UR QL STRIP: NEGATIVE
LYMPHOCYTES # BLD AUTO: 1.35 THOUSANDS/ÂΜL (ref 0.6–4.47)
LYMPHOCYTES NFR BLD AUTO: 17 % (ref 14–44)
MCH RBC QN AUTO: 28.8 PG (ref 26.8–34.3)
MCHC RBC AUTO-ENTMCNC: 31.7 G/DL (ref 31.4–37.4)
MCV RBC AUTO: 91 FL (ref 82–98)
MONOCYTES # BLD AUTO: 0.52 THOUSAND/ÂΜL (ref 0.17–1.22)
MONOCYTES NFR BLD AUTO: 7 % (ref 4–12)
MUCOUS THREADS UR QL AUTO: ABNORMAL
NEUTROPHILS # BLD AUTO: 5.77 THOUSANDS/ÂΜL (ref 1.85–7.62)
NEUTS SEG NFR BLD AUTO: 72 % (ref 43–75)
NITRITE UR QL STRIP: NEGATIVE
NON-SQ EPI CELLS URNS QL MICRO: ABNORMAL /HPF
NONHDLC SERPL-MCNC: 77 MG/DL
NRBC BLD AUTO-RTO: 0 /100 WBCS
PH UR STRIP.AUTO: 6 [PH]
PLATELET # BLD AUTO: 250 THOUSANDS/UL (ref 149–390)
PMV BLD AUTO: 10.6 FL (ref 8.9–12.7)
POTASSIUM SERPL-SCNC: 4.4 MMOL/L (ref 3.5–5.3)
PROT SERPL-MCNC: 6.6 G/DL (ref 6.4–8.4)
PROT UR STRIP-MCNC: ABNORMAL MG/DL
RBC # BLD AUTO: 5.13 MILLION/UL (ref 3.81–5.12)
RBC #/AREA URNS AUTO: ABNORMAL /HPF
SODIUM SERPL-SCNC: 139 MMOL/L (ref 135–147)
SP GR UR STRIP.AUTO: 1.02 (ref 1–1.03)
TRIGL SERPL-MCNC: 103 MG/DL (ref ?–150)
UROBILINOGEN UR STRIP-ACNC: <2 MG/DL
WBC # BLD AUTO: 7.91 THOUSAND/UL (ref 4.31–10.16)
WBC #/AREA URNS AUTO: ABNORMAL /HPF

## 2025-05-02 PROCEDURE — 87086 URINE CULTURE/COLONY COUNT: CPT

## 2025-05-02 PROCEDURE — 80053 COMPREHEN METABOLIC PANEL: CPT

## 2025-05-02 PROCEDURE — 80061 LIPID PANEL: CPT

## 2025-05-02 PROCEDURE — 81001 URINALYSIS AUTO W/SCOPE: CPT

## 2025-05-02 PROCEDURE — 83036 HEMOGLOBIN GLYCOSYLATED A1C: CPT

## 2025-05-02 PROCEDURE — 85025 COMPLETE CBC W/AUTO DIFF WBC: CPT

## 2025-05-02 PROCEDURE — 36415 COLL VENOUS BLD VENIPUNCTURE: CPT

## 2025-05-03 LAB
BACTERIA UR CULT: NORMAL
EST. AVERAGE GLUCOSE BLD GHB EST-MCNC: 229 MG/DL
HBA1C MFR BLD: 9.6 %

## 2025-05-06 ENCOUNTER — OFFICE VISIT (OUTPATIENT)
Dept: INTERNAL MEDICINE CLINIC | Facility: CLINIC | Age: 80
End: 2025-05-06
Payer: MEDICARE

## 2025-05-06 VITALS
SYSTOLIC BLOOD PRESSURE: 124 MMHG | HEIGHT: 65 IN | HEART RATE: 67 BPM | WEIGHT: 174 LBS | DIASTOLIC BLOOD PRESSURE: 86 MMHG | OXYGEN SATURATION: 96 % | BODY MASS INDEX: 28.99 KG/M2 | RESPIRATION RATE: 17 BRPM

## 2025-05-06 DIAGNOSIS — E78.2 MIXED HYPERLIPIDEMIA: ICD-10-CM

## 2025-05-06 DIAGNOSIS — I10 HYPERTENSION, BENIGN: ICD-10-CM

## 2025-05-06 DIAGNOSIS — E11.9 TYPE 2 DIABETES MELLITUS WITHOUT COMPLICATION, WITHOUT LONG-TERM CURRENT USE OF INSULIN (HCC): Primary | ICD-10-CM

## 2025-05-06 PROCEDURE — G2211 COMPLEX E/M VISIT ADD ON: HCPCS | Performed by: INTERNAL MEDICINE

## 2025-05-06 PROCEDURE — 99214 OFFICE O/P EST MOD 30 MIN: CPT | Performed by: INTERNAL MEDICINE

## 2025-05-06 NOTE — PROGRESS NOTES
"Name: Radha Frank      : 1945      MRN: [id number]  Encounter Provider: Jason Christianson MD  Encounter Date: 2025   Encounter department: [facility] INTERNAL MEDICINE [place]  :  Assessment & Plan  Type 2 diabetes mellitus without complication, without long-term current use of insulin (HCC)  The elevation is probably from the steroid she was on but she was interested in taking Jardiance like her  is on.  This would probably let us lower the metformin which might be giving her some GI issues.  Repeat labs in 1 month on this regimen.  Also recheck kidney function.  Lab Results   Component Value Date    HGBA1C 9.6 (H) 2025       Orders:  •  Empagliflozin (JARDIANCE) 10 MG TABS tablet; Take 1 tablet (10 mg total) by mouth daily  •  Comprehensive metabolic panel; Future  •  Hemoglobin A1C; Future    Hypertension, benign  Controlled.  No change in medication.       Mixed hyperlipidemia  Controlled.              History of Present Illness   Patient was in today for routine follow-up with her .  Her sugars were unexpectedly high but she noted while she was on the steroids for her wrist her sugars were through the roof.  Sometimes 400.  Is coming back down but she is still waking up with a sugar over 200.  She is taking her metformin as directed.  She was asking about Jardiance since her  was recently started on that.  Blood pressure is controlled.  Cholesterol is controlled.  Creatinine was again a little high.  Not sure if she was taking NSAIDs before the steroids for her wrist.  No other complaints today.  No further additions to her history.      Review of Systems   Respiratory:  Negative for shortness of breath.    Cardiovascular:  Negative for chest pain.   Gastrointestinal:  Negative for abdominal pain.       Objective   /86 (BP Location: Left arm, Patient Position: Sitting, Cuff Size: Adult)   Pulse 67   Resp 17   Ht 5' 4.5\" (1.638 m)   Wt 78.9 kg (174 lb)   SpO2 " 96%   BMI 29.41 kg/m²      Physical Exam  Vitals and nursing note reviewed.   Constitutional:       Appearance: Normal appearance. She is well-developed.   Cardiovascular:      Rate and Rhythm: Normal rate and regular rhythm.      Heart sounds: Normal heart sounds.   Pulmonary:      Effort: Pulmonary effort is normal.      Breath sounds: Normal breath sounds.   Abdominal:      Palpations: Abdomen is soft.      Tenderness: There is no abdominal tenderness.   Neurological:      Mental Status: She is alert and oriented to person, place, and time.   Psychiatric:         Mood and Affect: Mood normal.         Behavior: Behavior normal.

## 2025-05-06 NOTE — ASSESSMENT & PLAN NOTE
The elevation is probably from the steroid she was on but she was interested in taking Jardiance like her  is on.  This would probably let us lower the metformin which might be giving her some GI issues.  Repeat labs in 1 month on this regimen.  Also recheck kidney function.  Lab Results   Component Value Date    HGBA1C 9.6 (H) 05/02/2025       Orders:  •  Empagliflozin (JARDIANCE) 10 MG TABS tablet; Take 1 tablet (10 mg total) by mouth daily  •  Comprehensive metabolic panel; Future  •  Hemoglobin A1C; Future

## 2025-05-12 ENCOUNTER — OFFICE VISIT (OUTPATIENT)
Dept: UROLOGY | Facility: CLINIC | Age: 80
End: 2025-05-12
Payer: MEDICARE

## 2025-05-12 VITALS
HEART RATE: 82 BPM | SYSTOLIC BLOOD PRESSURE: 142 MMHG | HEIGHT: 65 IN | RESPIRATION RATE: 18 BRPM | BODY MASS INDEX: 29.29 KG/M2 | WEIGHT: 175.8 LBS | DIASTOLIC BLOOD PRESSURE: 82 MMHG | OXYGEN SATURATION: 99 % | TEMPERATURE: 97.8 F

## 2025-05-12 DIAGNOSIS — R35.0 URINARY FREQUENCY: Primary | ICD-10-CM

## 2025-05-12 DIAGNOSIS — N28.1 RENAL CYST: ICD-10-CM

## 2025-05-12 LAB — POST-VOID RESIDUAL VOLUME, ML POC: 117 ML

## 2025-05-12 PROCEDURE — 99213 OFFICE O/P EST LOW 20 MIN: CPT | Performed by: PHYSICIAN ASSISTANT

## 2025-05-12 PROCEDURE — 51798 US URINE CAPACITY MEASURE: CPT | Performed by: PHYSICIAN ASSISTANT

## 2025-05-12 NOTE — PROGRESS NOTES
"Name: Radha Frank      : 1945      MRN: [id number]  Encounter Provider: Lolis Blank PA-C  Encounter Date: 2025   Encounter department: [facility] UROLOGY [place]  :  Assessment & Plan  Urinary frequency  PVR today 110 mL  Continue Trospium  Double voiding, timed voiding  Recheck PVR at next follow up   Orders:    POCT Measure PVR    Renal cyst  CT AP wo contrast 25 - bilateral simple renal cysts  Asymptomatic  US in 1 year  Orders:    US kidney and bladder; Future        History of Present Illness   Radha Frank is a 79 y.o. female who presents for follow up.    Using trospium with benefit and without side effects. Denies new symptoms.     Review of Systems   Constitutional:  Negative for activity change, appetite change, chills and fever.   HENT:  Negative for congestion and trouble swallowing.    Respiratory:  Negative for cough and shortness of breath.    Cardiovascular:  Negative for chest pain, palpitations and leg swelling.   Gastrointestinal:  Negative for abdominal pain, constipation, diarrhea, nausea and vomiting.   Genitourinary:  Negative for difficulty urinating, dysuria, flank pain, frequency, hematuria and urgency.   Musculoskeletal:  Negative for back pain and gait problem.   Skin:  Negative for wound.   Allergic/Immunologic: Negative for immunocompromised state.   Neurological:  Negative for dizziness and syncope.   Hematological:  Does not bruise/bleed easily.   Psychiatric/Behavioral:  Negative for confusion.    All other systems reviewed and are negative.         Objective   /82 (BP Location: Left arm, Patient Position: Sitting, Cuff Size: Standard)   Pulse 82   Temp 97.8 °F (36.6 °C) (Tympanic)   Resp 18   Ht 5' 4.5\" (1.638 m)   Wt 79.7 kg (175 lb 12.8 oz)   SpO2 99%   BMI 29.71 kg/m²     Physical Exam  Constitutional:       Appearance: Normal appearance.   HENT:      Head: Normocephalic.      Nose: Nose normal.      Mouth/Throat:      Pharynx: " "Oropharynx is clear.   Eyes:      Pupils: Pupils are equal, round, and reactive to light.   Pulmonary:      Effort: Pulmonary effort is normal.   Musculoskeletal:      Cervical back: Normal range of motion.   Skin:     General: Skin is warm.   Neurological:      General: No focal deficit present.      Mental Status: She is alert and oriented to person, place, and time. Mental status is at baseline.   Psychiatric:         Mood and Affect: Mood normal.         Behavior: Behavior normal.         Thought Content: Thought content normal.         Judgment: Judgment normal.           Results   No results found for: \"PSA\"  Lab Results   Component Value Date    GLUCOSE 88 07/09/2015    CALCIUM 9.9 05/02/2025     07/09/2015    K 4.4 05/02/2025    CO2 26 05/02/2025     05/02/2025    BUN 21 05/02/2025    CREATININE 1.57 (H) 05/02/2025     Lab Results   Component Value Date    WBC 7.91 05/02/2025    HGB 14.8 05/02/2025    HCT 46.7 (H) 05/02/2025    MCV 91 05/02/2025     05/02/2025       Office Urine Dip  Recent Results (from the past hour)   POCT Measure PVR    Collection Time: 05/12/25  1:04 PM   Result Value Ref Range    POST-VOID RESIDUAL VOLUME, ML  mL         "

## 2025-05-12 NOTE — ASSESSMENT & PLAN NOTE
PVR today 110 mL  Continue Trospium  Double voiding, timed voiding  Recheck PVR at next follow up   Orders:    POCT Measure PVR

## 2025-05-12 NOTE — ASSESSMENT & PLAN NOTE
CT AP wo contrast 2/28/25 - bilateral simple renal cysts  Asymptomatic  US in 1 year  Orders:    US kidney and bladder; Future

## 2025-06-05 DIAGNOSIS — R10.84 GENERALIZED ABDOMINAL PAIN: ICD-10-CM

## 2025-06-05 RX ORDER — PANTOPRAZOLE SODIUM 40 MG/1
40 TABLET, DELAYED RELEASE ORAL DAILY
Qty: 90 TABLET | Refills: 1 | Status: SHIPPED | OUTPATIENT
Start: 2025-06-05

## 2025-06-06 DIAGNOSIS — E11.9 TYPE 2 DIABETES MELLITUS WITHOUT COMPLICATION, WITHOUT LONG-TERM CURRENT USE OF INSULIN (HCC): ICD-10-CM

## 2025-06-06 RX ORDER — METFORMIN HYDROCHLORIDE 500 MG/1
1000 TABLET, EXTENDED RELEASE ORAL 2 TIMES DAILY WITH MEALS
Qty: 360 TABLET | Refills: 1 | Status: SHIPPED | OUTPATIENT
Start: 2025-06-06

## 2025-06-19 ENCOUNTER — NURSE TRIAGE (OUTPATIENT)
Age: 80
End: 2025-06-19

## 2025-06-19 NOTE — TELEPHONE ENCOUNTER
REASON FOR CONVERSATION: Abdominal Pain    SYMPTOMS: upper abdominal pain, worse after eating, intermittent, bloating, nausea     OTHER HEALTH INFORMATION: hx of GERD, taking bentyl with no relief, taking Protonix daily, last OV 2023, last EGD 2023 , eating triggering foods at times     PROTOCOL DISPOSITION: Next Available Appointment and 72 Hour Provider Response    CARE ADVICE PROVIDED: advised GERD precautions for diet, increase PPI BID for 4 weeks, start Pepcid 20 mg BID PRN, Gaviscon as needed for breakthrough pain     PRACTICE FOLLOW-UP: made urgent f/u for 7/3                 Reason for Disposition   The patient is on PPI once a day with continued epigastric discomfort, reflux, regurgitation    Answer Assessment - Initial Assessment Questions  1. Do you have a history of GERD?  Yes   2. When did your symptoms start? Please describe your symptoms and how often you experience these symptoms.  Upper chest under rib cage , intermittent pain  3. Are you taking any acid reducing medications currently such as: Prilosec (Omeprazole), Protonix (Pantoprazole), Prevacid (Lansoprazole), Nexium (Esomeprazole), Aciphex (Rabeprazole), Dexilant (Dexlansoprazole)?  Pantoprazole daily   4. Have you tried any OTC acid reducing medications? (If so, which medications have you tried?) Zantac (Ranitidine), Pepcid (Famotidine), Tagamet (Cimetidine) Tums, Rolaids, Maalox, Mylanta.  Denies   5. What was the outcome of taking the medications which you have for these symptoms?  No relief   6. Have you experienced any recent changes in your bowel habits?  No   7. Have you had any new life stressors or diet changes?  No   8. Does anything make your symptoms better?  Nothing   9. Have you had any recent blood work, imaging, or procedures done? If yes, what were those?   Last OV 2023    Protocols used: GI-[facility]-ADULT-[facility]

## 2025-07-03 ENCOUNTER — OFFICE VISIT (OUTPATIENT)
Dept: GASTROENTEROLOGY | Facility: CLINIC | Age: 80
End: 2025-07-03
Payer: MEDICARE

## 2025-07-03 VITALS
BODY MASS INDEX: 28.32 KG/M2 | DIASTOLIC BLOOD PRESSURE: 72 MMHG | SYSTOLIC BLOOD PRESSURE: 110 MMHG | OXYGEN SATURATION: 99 % | HEART RATE: 78 BPM | TEMPERATURE: 97.5 F | HEIGHT: 65 IN | WEIGHT: 170 LBS

## 2025-07-03 DIAGNOSIS — R10.10 PAIN OF UPPER ABDOMEN: Primary | ICD-10-CM

## 2025-07-03 DIAGNOSIS — R10.84 GENERALIZED ABDOMINAL PAIN: ICD-10-CM

## 2025-07-03 PROCEDURE — 99213 OFFICE O/P EST LOW 20 MIN: CPT | Performed by: PHYSICIAN ASSISTANT

## 2025-07-03 RX ORDER — PANTOPRAZOLE SODIUM 40 MG/1
40 TABLET, DELAYED RELEASE ORAL DAILY
Qty: 30 TABLET | Refills: 3 | Status: SHIPPED | OUTPATIENT
Start: 2025-07-03

## 2025-07-03 RX ORDER — AMITRIPTYLINE HYDROCHLORIDE 10 MG/1
10 TABLET ORAL
Qty: 30 TABLET | Refills: 3 | Status: SHIPPED | OUTPATIENT
Start: 2025-07-03

## 2025-07-03 NOTE — PROGRESS NOTES
Name: Radha Frank      : 1945      MRN: [id number]  Encounter Provider: Lesvia Dueñas PA-C  Encounter Date: 7/3/2025   Encounter department: [facility] GASTROENTEROLOGY SPECIALISTS [place]  :  Assessment & Plan  Pain of upper abdomen  Recurrent episodic symptoms since   Can be associated with N/V/D/C but this is not always the case  Pain can last for 1-7 days  Dicyclomine is not helpful    EGD in  noted gastritis and duodenal ulcers  She has been on Pantoprazole in the past but is not on this anymore  Biopsies for Hpylori were negative in  but she reports a history of this  Restart Pantoprazole but check stool H Pylori first    Will check HIDA and GES as these have not been done yet    Colonoscopy in  noted a small polyp and diverticulosis    CT AP from 2025 noted constipation  Independent of episodes she has regular, large, complete Bms     She has a history of endometriosis and abdominal surgeries  Suspect abdominal adhesive disease as the source of her symptoms  Start Amitriptyline 10mg qHS      History of Present Illness   HPI  Radha Frank is a 79 y.o. female with a history of diabetes mellitus, colon polyps, GERD, peptic ulcer disease, hypertension, hyperlipidemia, lactose intolerance who presents for follow-up of recurrent abdominal pain symptoms.  She reports that her symptoms began in  when she was at [employer].  She had an acute onset of abdominal pain and diarrhea.  After this she would have abdominal pain of symptoms about once a year which would last from 1 to a few days.  Over time it became more frequent to where it was at least twice a year and then to where it was at least once a month and now it can be 1-2 times a month.  She reports that the symptoms can last anywhere from 1 to 7 days.  Her most recent episode was the longest lasting.  She has undergone workup for the symptoms to include imaging, EGD and colonoscopy.  She has been found to have duodenal  ulcers.  She reports she was diagnosed with H. pylori in [place] several years ago.  Her biopsies from 2023 when she was found to have duodenal ulcers were negative.  She has been on pantoprazole in the past but reports she has been unable to get this filled recently.  She reports that when this symptom comes on it can be associate with nausea, vomiting, diarrhea and/or constipation.  She reports that the pain can also occur without any of those symptoms.  The pain is the most significant symptom.  She has a history of endometriosis and several abdominal surgeries.  She likely has scar tissue in her abdomen.  She has no rectal bleeding or unexpected weight loss.  She does report a poor appetite and early satiety.      Review of Systems  Medical History Reviewed by provider this encounter:  Problems     .  Past Medical History   Past Medical History[1]  Past Surgical History[2]  Family History[3]   reports that she has never smoked. She has never used smokeless tobacco. She reports that she does not drink alcohol and does not use drugs.  Current Outpatient Medications   Medication Instructions    amitriptyline (ELAVIL) 10 mg, Oral, Daily at bedtime    amLODIPine (NORVASC) 5 mg, Oral, Daily    clotrimazole-betamethasone (LOTRISONE) 1-0.05 % cream Topical, 2 times daily    Diclofenac Sodium (VOLTAREN) 2 g, Topical, 4 times daily    dicyclomine (BENTYL) 10 mg capsule TAKE 1 CAPSULE (10 MG TOTAL) BY MOUTH 3 TIMES A DAY AS NEEDED FOR ABDOMINAL PAIN    Empagliflozin (JARDIANCE) 10 mg, Oral, Daily    gabapentin (NEURONTIN) 300 mg, 3 times daily    glucose blood (Accu-Chek Guide Test) test strip CHECK SUGAR DAILY    hydrocortisone (ANUSOL-HC) 2.5 % rectal cream Topical, 2 times daily    levocetirizine (XYZAL) 5 mg, As needed    lidocaine (Lidoderm) 5 % 1 patch, Topical, Daily, Remove & Discard patch within 12 hours or as directed by MD    lisinopril (ZESTRIL) 10 mg, Oral, Daily    metFORMIN (GLUCOPHAGE-XR) 1,000 mg,  "Oral, 2 times daily with meals    pantoprazole (PROTONIX) 40 mg, Oral, Daily    pantoprazole (PROTONIX) 40 mg, Oral, Daily    rosuvastatin (CRESTOR) 40 mg, Oral, Daily    trospium chloride (SANCTURA) 20 mg, Oral, Daily   Allergies[4]   Medications Ordered Prior to Encounter[5]   Social History[6]     Objective   /72 (BP Location: Right arm, Patient Position: Sitting, Cuff Size: Standard)   Pulse 78   Temp 97.5 °F (36.4 °C) (Temporal)   Ht 5' 5\" (1.651 m)   Wt 77.1 kg (170 lb)   SpO2 99%   BMI 28.29 kg/m²      Physical Exam           [1]   Past Medical History:  Diagnosis Date    Allergic     Arthritis     Colon polyp Not sure    Diabetes mellitus (HCC)     Diverticulitis of colon     Fatty liver Not sure    GERD (gastroesophageal reflux disease)     Headache(784.0)     HNP (herniated nucleus pulposus), lumbar     Hyperlipidemia     Hypertension     IBS (irritable bowel syndrome)     Lactose intolerance 1980    Osteoarthritis     Urinary incontinence    [2]   Past Surgical History:  Procedure Laterality Date    COLONOSCOPY  2017    PARATHYROID GLAND SURGERY      resolved 05/12    [facility] ESOPHAGOGASTRODUODENOSCOPY TRANSORAL DIAGNOSTIC N/A 04/19/2017    Procedure: EGD AND COLONOSCOPY;  Surgeon: Froylan Carbone III, MD;  Location: [facility];  Service: Gastroenterology    THYROID SURGERY      TUBAL LIGATION  1973    UMBILICAL HERNIA REPAIR     [3]   Family History  Problem Relation Name Age of Onset    Breast cancer Mother Fabiola Duran     Diabetes Mother Fabiola Duran     Hypertension Mother Fabiola Duran     Arthritis Mother Fabiola Duran     Cancer Mother Fabiola Duran     Heart defect Brother      Arthritis Family      Hypertension Family      No Known Problems Father      No Known Problems Brother     [4]   Allergies  Allergen Reactions    Nuts - Food Allergy Anaphylaxis     Raw Almonds only    Codeine Other (See Comments)     Dizzy, lightheaded   [5]   Current Outpatient Medications on " File Prior to Visit   Medication Sig Dispense Refill    amLODIPine (NORVASC) 5 mg tablet TAKE 1 TABLET (5 MG TOTAL) BY MOUTH DAILY. 90 tablet 1    clotrimazole-betamethasone (LOTRISONE) 1-0.05 % cream Apply topically 2 (two) times a day 45 g 1    Diclofenac Sodium (VOLTAREN) 1 % Apply 2 g topically 4 (four) times a day 100 g 1    dicyclomine (BENTYL) 10 mg capsule TAKE 1 CAPSULE (10 MG TOTAL) BY MOUTH 3 TIMES A DAY AS NEEDED FOR ABDOMINAL PAIN 270 capsule 1    Empagliflozin (JARDIANCE) 10 MG TABS tablet Take 1 tablet (10 mg total) by mouth daily 90 tablet 3    gabapentin (NEURONTIN) 300 mg capsule Take 300 mg by mouth in the morning and 300 mg in the evening and 300 mg before bedtime.      glucose blood (Accu-Chek Guide Test) test strip CHECK SUGAR DAILY 100 strip 1    hydrocortisone (ANUSOL-HC) 2.5 % rectal cream Apply topically 2 (two) times a day 28 g 3    levocetirizine (XYZAL) 5 MG tablet Take 5 mg by mouth as needed      lidocaine (Lidoderm) 5 % Apply 1 patch topically over 12 hours daily Remove & Discard patch within 12 hours or as directed by MD 12 patch 0    lisinopril (ZESTRIL) 10 mg tablet TAKE 1 TABLET BY MOUTH EVERY DAY 90 tablet 1    metFORMIN (GLUCOPHAGE-XR) 500 mg 24 hr tablet TAKE 2 TABLETS BY MOUTH TWICE A DAY WITH FOOD 360 tablet 1    pantoprazole (PROTONIX) 40 mg tablet TAKE 1 TABLET BY MOUTH EVERY DAY 90 tablet 1    rosuvastatin (CRESTOR) 40 MG tablet Take 1 tablet (40 mg total) by mouth daily 90 tablet 3    trospium chloride (SANCTURA) 20 mg tablet Take 1 tablet (20 mg total) by mouth daily 90 tablet 3    [DISCONTINUED] meloxicam (MOBIC) 15 mg tablet Take 1 tablet (15 mg total) by mouth daily as needed for moderate pain USE THIS MEDICATION SPARINGLY. 15 tablet 0    [DISCONTINUED] chlordiazepoxide-clidinium (LIBRAX) 5-2.5 mg per capsule Take 1 capsule by mouth 4 (four) times a day (before meals and at bedtime) (Patient not taking: Reported on 2/4/2025) 360 capsule 0     No current  facility-administered medications on file prior to visit.   [6]   Social History  Tobacco Use    Smoking status: Never    Smokeless tobacco: Never   Vaping Use    Vaping status: Never Used   Substance and Sexual Activity    Alcohol use: No    Drug use: No    Sexual activity: Yes     Partners: Male     Birth control/protection: None

## 2025-07-17 ENCOUNTER — APPOINTMENT (OUTPATIENT)
Age: 80
End: 2025-07-17
Payer: MEDICARE

## 2025-07-17 DIAGNOSIS — R10.10 PAIN OF UPPER ABDOMEN: ICD-10-CM

## 2025-07-17 DIAGNOSIS — R10.84 GENERALIZED ABDOMINAL PAIN: ICD-10-CM

## 2025-07-17 PROCEDURE — 87338 HPYLORI STOOL AG IA: CPT

## 2025-07-18 ENCOUNTER — HOSPITAL ENCOUNTER (OUTPATIENT)
Dept: NUCLEAR MEDICINE | Facility: HOSPITAL | Age: 80
End: 2025-07-18
Attending: PHYSICIAN ASSISTANT
Payer: MEDICARE

## 2025-07-18 DIAGNOSIS — R10.10 PAIN OF UPPER ABDOMEN: ICD-10-CM

## 2025-07-18 LAB — H PYLORI AG STL QL IA: NEGATIVE

## 2025-07-18 PROCEDURE — 78227 HEPATOBIL SYST IMAGE W/DRUG: CPT

## 2025-07-18 PROCEDURE — A9537 TC99M MEBROFENIN: HCPCS

## 2025-07-18 RX ORDER — SINCALIDE 5 UG/5ML
0.02 INJECTION, POWDER, LYOPHILIZED, FOR SOLUTION INTRAVENOUS ONCE
Status: COMPLETED | OUTPATIENT
Start: 2025-07-18 | End: 2025-07-18

## 2025-07-18 RX ADMIN — SINCALIDE 1.5 MCG: 5 INJECTION, POWDER, LYOPHILIZED, FOR SOLUTION INTRAVENOUS at 12:00

## 2025-07-27 DIAGNOSIS — R10.84 GENERALIZED ABDOMINAL PAIN: ICD-10-CM

## 2025-07-27 DIAGNOSIS — R10.10 PAIN OF UPPER ABDOMEN: ICD-10-CM

## 2025-07-29 RX ORDER — AMITRIPTYLINE HYDROCHLORIDE 10 MG/1
10 TABLET ORAL
Qty: 90 TABLET | Refills: 1 | Status: SHIPPED | OUTPATIENT
Start: 2025-07-29

## 2025-07-30 ENCOUNTER — TELEPHONE (OUTPATIENT)
Dept: GASTROENTEROLOGY | Facility: CLINIC | Age: 80
End: 2025-07-30

## 2025-08-06 ENCOUNTER — HOSPITAL ENCOUNTER (OUTPATIENT)
Dept: NUCLEAR MEDICINE | Facility: HOSPITAL | Age: 80
Discharge: HOME | End: 2025-08-06
Attending: PHYSICIAN ASSISTANT
Payer: MEDICARE

## 2025-08-07 ENCOUNTER — APPOINTMENT (OUTPATIENT)
Age: 80
End: 2025-08-07
Payer: MEDICARE

## 2025-08-08 ENCOUNTER — OFFICE VISIT (OUTPATIENT)
Dept: INTERNAL MEDICINE CLINIC | Facility: CLINIC | Age: 80
End: 2025-08-08
Payer: MEDICARE

## 2025-08-08 VITALS
RESPIRATION RATE: 17 BRPM | DIASTOLIC BLOOD PRESSURE: 78 MMHG | SYSTOLIC BLOOD PRESSURE: 118 MMHG | WEIGHT: 165 LBS | BODY MASS INDEX: 27.49 KG/M2 | HEIGHT: 65 IN

## 2025-08-08 DIAGNOSIS — E11.9 TYPE 2 DIABETES MELLITUS WITHOUT COMPLICATION, WITHOUT LONG-TERM CURRENT USE OF INSULIN (HCC): ICD-10-CM

## 2025-08-08 DIAGNOSIS — E78.2 MIXED HYPERLIPIDEMIA: ICD-10-CM

## 2025-08-08 DIAGNOSIS — I10 HYPERTENSION, BENIGN: Primary | ICD-10-CM

## 2025-08-08 PROCEDURE — G2211 COMPLEX E/M VISIT ADD ON: HCPCS | Performed by: INTERNAL MEDICINE

## 2025-08-08 PROCEDURE — 99214 OFFICE O/P EST MOD 30 MIN: CPT | Performed by: INTERNAL MEDICINE

## 2025-08-08 RX ORDER — METFORMIN HYDROCHLORIDE 500 MG/1
500 TABLET, EXTENDED RELEASE ORAL
Qty: 90 TABLET | Refills: 1 | Status: SHIPPED | OUTPATIENT
Start: 2025-08-08

## 2025-08-08 RX ORDER — LISINOPRIL 10 MG/1
5 TABLET ORAL DAILY
Qty: 90 TABLET | Refills: 1 | Status: SHIPPED | OUTPATIENT
Start: 2025-08-08

## 2025-08-08 RX ORDER — METFORMIN HYDROCHLORIDE 500 MG/1
500 TABLET, EXTENDED RELEASE ORAL
Qty: 90 TABLET | Refills: 1 | Status: SHIPPED | OUTPATIENT
Start: 2025-08-08 | End: 2025-08-08 | Stop reason: SDUPTHER